# Patient Record
Sex: FEMALE | Race: WHITE | Employment: FULL TIME | URBAN - METROPOLITAN AREA
[De-identification: names, ages, dates, MRNs, and addresses within clinical notes are randomized per-mention and may not be internally consistent; named-entity substitution may affect disease eponyms.]

---

## 2017-01-06 ENCOUNTER — ROUTINE PRENATAL (OUTPATIENT)
Dept: OBGYN CLINIC | Age: 27
End: 2017-01-06

## 2017-01-06 VITALS — SYSTOLIC BLOOD PRESSURE: 110 MMHG | WEIGHT: 130.4 LBS | DIASTOLIC BLOOD PRESSURE: 60 MMHG | BODY MASS INDEX: 25.47 KG/M2

## 2017-01-06 DIAGNOSIS — Z34.02 ENCOUNTER FOR SUPERVISION OF NORMAL FIRST PREGNANCY IN SECOND TRIMESTER: Primary | ICD-10-CM

## 2017-01-06 LAB — AFP, MATERNAL, EXTERNAL: NORMAL

## 2017-01-06 NOTE — PATIENT INSTRUCTIONS

## 2017-01-06 NOTE — PROGRESS NOTES
Normal NT. AFP today. Doing well. Still taking Diclegis.  Will start PNV (taking gummies) and if can't will take slow Fe

## 2017-01-06 NOTE — PROGRESS NOTES
Problem List  Date Reviewed: 11/23/2016          Codes Class Noted    Supervision of normal first pregnancy ICD-10-CM: Z34.00  ICD-9-CM: V22.0  11/30/2016    Overview Addendum 12/14/2016  3:23 PM by Dee Winkler     Intrauterine pregnancy with the following problems identified:   [facility] 6/25/2017 by ovulation date and US  Hx of exercise induced asthma  Had flu vaccine at work  First trimester blood work WNL             H/O seasonal allergies ICD-10-CM: Z88.9  ICD-9-CM: V15.09  Unknown        Fibromyalgia ICD-10-CM: M79.7  ICD-9-CM: 729.1  Unknown        Celiac disease ICD-10-CM: K90.0  ICD-9-CM: 579.0  Unknown

## 2017-01-10 LAB
AFP ADJ MOM SERPL: 1.37
AFP INTERP SERPL-IMP: NORMAL
AFP INTERP SERPL-IMP: NORMAL
AFP SERPL-MCNC: 48.5 NG/ML
AGE AT DELIVERY: 29.4 YEARS
COMMENT, 018013: NORMAL
GA METHOD: NORMAL
GA: 16 WEEKS
IDDM PATIENT QL: NO
Lab: NORMAL
MULTIPLE PREGNANCY: NO
NEURAL TUBE DEFECT RISK FETUS: 3920 %
RESULTS, 017004: NORMAL

## 2017-02-03 ENCOUNTER — ROUTINE PRENATAL (OUTPATIENT)
Dept: OBGYN CLINIC | Age: 27
End: 2017-02-03

## 2017-02-03 VITALS — WEIGHT: 132 LBS | BODY MASS INDEX: 25.78 KG/M2 | DIASTOLIC BLOOD PRESSURE: 62 MMHG | SYSTOLIC BLOOD PRESSURE: 114 MMHG

## 2017-02-03 DIAGNOSIS — Z34.02 ENCOUNTER FOR SUPERVISION OF NORMAL FIRST PREGNANCY IN SECOND TRIMESTER: Primary | ICD-10-CM

## 2017-02-03 DIAGNOSIS — O28.3 ABNORMAL FETAL ULTRASOUND: ICD-10-CM

## 2017-02-03 NOTE — PATIENT INSTRUCTIONS

## 2017-02-03 NOTE — PROGRESS NOTES
US c/w prior dating. IVEF x 2. Disc small assn with Tri 21, patient's risk was <1/10K on NT. Doing well.  See MFM

## 2017-02-03 NOTE — PROGRESS NOTES
FETAL SURVEY  A SINGLE VIABLE IUP AT 20W1D GA BY LMP. FETAL CARDIAC MOTION OBSERVED. FETAL ANATOMY WELL VISUALIZED AND APPEARS WITHIN NORMAL LIMITS. TWO LEFT IVEF'S ARE SEEN TODAY. APPROPRIATE GROWTH MEASURED; SIZE = DATES. MARYCRUZ, CERVIX AND PLACENTA APPEAR WITHIN NORMAL LIMITS. GENDER: XY, PATIENT DOES NOT KNOW.     Problem List  Date Reviewed: 1/6/2017          Codes Class Noted    Supervision of normal first pregnancy ICD-10-CM: Z34.00  ICD-9-CM: V22.0  11/30/2016    Overview Addendum 1/10/2017  3:44 PM by Dee Winkler     Intrauterine pregnancy with the following problems identified:   [facility] 6/25/2017 by ovulation date and US  Hx of exercise induced asthma  Had flu vaccine at work  First trimester blood work WNL, MSAFP neg             H/O seasonal allergies ICD-10-CM: Z88.9  ICD-9-CM: V15.09  Unknown        Fibromyalgia ICD-10-CM: M79.7  ICD-9-CM: 729.1  Unknown        Celiac disease ICD-10-CM: K90.0  ICD-9-CM: 579.0  Unknown

## 2017-02-15 ENCOUNTER — HOSPITAL ENCOUNTER (OUTPATIENT)
Dept: PERINATAL CARE | Age: 27
Discharge: HOME | End: 2017-02-15
Attending: OBSTETRICS & GYNECOLOGY
Payer: COMMERCIAL

## 2017-02-15 PROCEDURE — 76811 OB US DETAILED SNGL FETUS: CPT | Performed by: OBSTETRICS & GYNECOLOGY

## 2017-03-03 ENCOUNTER — ROUTINE PRENATAL (OUTPATIENT)
Dept: OBGYN CLINIC | Age: 27
End: 2017-03-03

## 2017-03-03 VITALS — DIASTOLIC BLOOD PRESSURE: 58 MMHG | BODY MASS INDEX: 26.64 KG/M2 | SYSTOLIC BLOOD PRESSURE: 110 MMHG | WEIGHT: 136.4 LBS

## 2017-03-03 DIAGNOSIS — Z34.02 ENCOUNTER FOR SUPERVISION OF NORMAL FIRST PREGNANCY IN SECOND TRIMESTER: Primary | ICD-10-CM

## 2017-03-03 NOTE — PATIENT INSTRUCTIONS
Weeks 22 to 26 of Your Pregnancy: Care Instructions  Your Care Instructions    As you enter your 7th month of pregnancy at week 26, your baby's lungs are growing stronger and getting ready to breathe. You may notice that your baby responds to the sound of your or your partner's voice. You may also notice that your baby does less turning and twisting and more squirming or jerking. Jerking often means that your baby has the hiccups. Hiccups are perfectly normal and are only temporary. You may want to think about attending a childbirth preparation class. This is also a good time to start thinking about whether you want to have pain medicine during labor. Most pregnant women are tested for gestational diabetes between weeks 25 and 28. Gestational diabetes occurs when your blood sugar level gets too high when you're pregnant. The test is important, because you can have gestational diabetes and not know it. But the condition can cause problems for your baby. Follow-up care is a key part of your treatment and safety. Be sure to make and go to all appointments, and call your doctor if you are having problems. It's also a good idea to know your test results and keep a list of the medicines you take. How can you care for yourself at home? Ease discomfort from your baby's kicking  · Change your position. Sometimes this will cause your baby to change position too. · Take a deep breath while you raise your arm over your head. Then breathe out while you drop your arm. Do Kegel exercises to prevent urine from leaking  · You can do Kegel exercises while you stand or sit. ¨ Squeeze the same muscles you would use to stop your urine. Your belly and thighs should not move. ¨ Hold the squeeze for 3 seconds, and then relax for 3 seconds. ¨ Start with 3 seconds. Then add 1 second each week until you are able to squeeze for 10 seconds. ¨ Repeat the exercise 10 to 15 times for each session.  Do three or more sessions each day.  Ease or reduce swelling in your feet, ankles, hands, and fingers  · If your fingers are puffy, take off your rings. · Do not eat high-salt foods, such as potato chips. · Prop up your feet on a stool or couch as much as possible. Sleep with pillows under your feet. · Do not stand for long periods of time or wear tight shoes. · Wear support stockings. Where can you learn more? Go to http://karolyn-brandin.info/. Enter G264 in the search box to learn more about \"Weeks 22 to 26 of Your Pregnancy: Care Instructions. \"  Current as of: May 30, 2016  Content Version: 11.1  © 8301-5348 [employer]. Care instructions adapted under license by [employer] (which disclaims liability or warranty for this information). If you have questions about a medical condition or this instruction, always ask your healthcare professional. [place] [address] any warranty or liability for your use of this information.

## 2017-03-03 NOTE — PROGRESS NOTES
Problem List  Date Reviewed: 2/3/2017          Codes Class Noted    Supervision of normal first pregnancy ICD-10-CM: Z34.00  ICD-9-CM: V22.0  11/30/2016    Overview Addendum 2/16/2017  1:07 PM by Dee Winkler     Intrauterine pregnancy with the following problems identified:   [facility] 6/25/2017 by ovulation date and US  Hx of exercise induced asthma  Had flu vaccine at work  First trimester blood work WNL, MSAFP neg  MFM 34-36 to reeval myocardial EF             H/O seasonal allergies ICD-10-CM: Z88.9  ICD-9-CM: V15.09  Unknown        Fibromyalgia ICD-10-CM: M79.7  ICD-9-CM: 729.1  Unknown        Celiac disease ICD-10-CM: K90.0  ICD-9-CM: 579.0  Unknown

## 2017-03-28 LAB — GTT, 1 HR, GLUCOLA, EXTERNAL: NORMAL

## 2017-03-30 ENCOUNTER — ROUTINE PRENATAL (OUTPATIENT)
Dept: OBGYN CLINIC | Age: 27
End: 2017-03-30

## 2017-03-30 VITALS — WEIGHT: 138 LBS | SYSTOLIC BLOOD PRESSURE: 100 MMHG | BODY MASS INDEX: 26.95 KG/M2 | DIASTOLIC BLOOD PRESSURE: 58 MMHG

## 2017-03-30 DIAGNOSIS — Z23 ENCOUNTER FOR IMMUNIZATION: ICD-10-CM

## 2017-03-30 DIAGNOSIS — Z34.03 SUPERVISION OF NORMAL FIRST PREGNANCY IN THIRD TRIMESTER: Primary | ICD-10-CM

## 2017-03-30 LAB
HCT, EXTERNAL: 33.2
HGB, EXTERNAL: 11.2
PLATELET CNT,   EXTERNAL: 235

## 2017-03-30 NOTE — PROGRESS NOTES
Administered TDAP vaccine per MD order. Patient consent signed. Injection given IM in left deltoid, per patient request. Patient tolerated well, no complications.

## 2017-03-30 NOTE — PATIENT INSTRUCTIONS
Weeks 26 to 30 of Your Pregnancy: Care Instructions  Your Care Instructions    You are now in your last trimester of pregnancy. Your baby is growing rapidly. And you'll probably feel your baby moving around more often. Your doctor may ask you to count your baby's kicks. Your back may ache as your body gets used to your baby's size and length. If you haven't already had the Tdap shot during this pregnancy, talk to your doctor about getting it. It will help protect your  against pertussis infection. During this time, it's important to take care of yourself and pay attention to what your body needs. If you feel sexual, explore ways to be close with your partner that match your comfort and desire. Use the tips provided in this care sheet to find ways to be sexual in your own way. Follow-up care is a key part of your treatment and safety. Be sure to make and go to all appointments, and call your doctor if you are having problems. It's also a good idea to know your test results and keep a list of the medicines you take. How can you care for yourself at home? Take it easy at work  · Take frequent breaks. If possible, stop working when you are tired, and rest during your lunch hour. · Take bathroom breaks every 2 hours. · Change positions often. If you sit for long periods, stand up and walk around. · When you stand for a long time, keep one foot on a low stool with your knee bent. After standing a lot, sit with your feet up. · Avoid fumes, chemicals, and tobacco smoke. Be sexual in your own way  · Having sex during pregnancy is okay, unless your doctor tells you not to. · You may be very interested in sex, or you may have no interest at all. · Your growing belly can make it hard to find a good position during intercourse. [place] and explore. · You may get cramps in your uterus when your partner touches your breasts.   · A back rub may relieve the backache or cramps that sometimes follow orgasm. Learn about  labor  · Watch for signs of  labor. You may be going into labor if:  ¨ You have menstrual-like cramps, with or without nausea. ¨ You have about 4 or more contractions in 20 minutes, or about 8 or more within 1 hour, even after you have had a glass of water and are resting. ¨ You have a low, dull backache that does not go away when you change your position. ¨ You have pain or pressure in your pelvis that comes and goes in a pattern. ¨ You have intestinal cramping or flu-like symptoms, with or without diarrhea. ¨ You notice an increase or change in your vaginal discharge. Discharge may be heavy, mucus-like, watery, or streaked with blood. ¨ Your water breaks. · If you think you have  labor:  ¨ Drink 2 or 3 glasses of water or juice. Not drinking enough fluids can cause contractions. ¨ Stop what you are doing, and empty your bladder. Then lie down on your left side for at least 1 hour. ¨ While lying on your side, find your breast bone. Put your fingers in the soft spot just below it. Move your fingers down toward your belly button to find the top of your uterus. Check to see if it is tight. ¨ Contractions can be weak or strong. Record your contractions for an hour. Time a contraction from the start of one contraction to the start of the next one. ¨ Single or several strong contractions without a pattern are called Wilver-Ames contractions. They are practice contractions but not the start of labor. They often stop if you change what you are doing. ¨ Call your doctor if you have regular contractions. Where can you learn more? Go to http://karolyn-brandin.info/. Enter Z174 in the search box to learn more about \"Weeks 26 to 30 of Your Pregnancy: Care Instructions. \"  Current as of: May 30, 2016  Content Version: 11.2  © 5373-4549 [employer].  Care instructions adapted under license by [employer] (which disclaims liability or warranty for this information). If you have questions about a medical condition or this instruction, always ask your healthcare professional. [place] [address] any warranty or liability for your use of this information.

## 2017-03-30 NOTE — PROGRESS NOTES
Problem List  Date Reviewed: 3/3/2017          Codes Class Noted    Supervision of normal first pregnancy ICD-10-CM: Z34.00  ICD-9-CM: V22.0  11/30/2016    Overview Addendum 2/16/2017  1:07 PM by Dee Winkler     Intrauterine pregnancy with the following problems identified:   [facility] 6/25/2017 by ovulation date and US  Hx of exercise induced asthma  Had flu vaccine at work  First trimester blood work WNL, MSAFP neg  MFM 34-36 to reeval myocardial EF             H/O seasonal allergies ICD-10-CM: Z88.9  ICD-9-CM: V15.09  Unknown        Fibromyalgia ICD-10-CM: M79.7  ICD-9-CM: 729.1  Unknown        Celiac disease ICD-10-CM: K90.0  ICD-9-CM: 579.0  Unknown

## 2017-04-17 ENCOUNTER — ROUTINE PRENATAL (OUTPATIENT)
Dept: OBGYN CLINIC | Age: 27
End: 2017-04-17

## 2017-04-17 VITALS — SYSTOLIC BLOOD PRESSURE: 104 MMHG | WEIGHT: 142.6 LBS | BODY MASS INDEX: 27.85 KG/M2 | DIASTOLIC BLOOD PRESSURE: 60 MMHG

## 2017-04-17 DIAGNOSIS — Z34.03 SUPERVISION OF NORMAL FIRST PREGNANCY IN THIRD TRIMESTER: Primary | ICD-10-CM

## 2017-04-17 NOTE — PROGRESS NOTES
LIMITED OB SCAN  A SINGLE VERTEX 30W4D IUP IS SEEN. FETAL CARDIAC MOTION OBSERVED. LIMITED ANATOMY WAS VISUALIZED AND APPEARS WNL. APPROPRIATE FETAL GROWTH IS SEEN. SIZE = DATES. MARYCRUZ AND PLACENTA APPEAR WNL.

## 2017-04-17 NOTE — PATIENT INSTRUCTIONS
Weeks 30 to 32 of Your Pregnancy: Care Instructions  Your Care Instructions    You have made it to the final months of your pregnancy. By now, your baby is really starting to look like a baby, with hair and plump skin. As you enter the final weeks of pregnancy, the reality of having a baby may start to set in. This is the time to settle on a name, get your household in order, set up a safe nursery, and find quality  if needed. Doing these things in advance will allow you to focus on caring for and enjoying your new baby. You may also want to have a tour of your hospital's labor and delivery unit to get a better idea of what to expect while you are in the hospital.  During these last months, it is very important to take good care of yourself and pay attention to what your body needs. If your doctor says it is okay for you to work, don't push yourself too hard. Use the tips provided in this care sheet to ease heartburn and care for varicose veins. If you haven't already had the Tdap shot during this pregnancy, talk to your doctor about getting it. It will help protect your  against pertussis infection. Follow-up care is a key part of your treatment and safety. Be sure to make and go to all appointments, and call your doctor if you are having problems. It's also a good idea to know your test results and keep a list of the medicines you take. How can you care for yourself at home? Pay attention to your baby's movements  · You should feel your baby move several times every day. · Your baby now turns less, and kicks and jabs more. · Your baby sleeps 20 to 45 minutes at a time and is more active at certain times of day. · If your doctor wants you to count your baby's kicks:  ¨ Empty your bladder, and lie on your side or relax in a comfortable chair. ¨ Write down your start time. ¨ Pay attention only to your baby's movements. Count any movement except hiccups.   ¨ After you have counted 10 movements, write down your stop time. ¨ Write down how many minutes it took for your baby to move 10 times. ¨ If an hour goes by and you have not recorded 10 movements, have something to eat or drink and then count for another hour. If you do not record 10 movements in either hour, call your doctor. Ease heartburn  · Eat small, frequent meals. · Do not eat chocolate, peppermint, or very spicy foods. Avoid drinks with caffeine, such as coffee, tea, and sodas. · Avoid bending over or lying down after meals. · Talk a short walk after you eat. · If heartburn is a problem at night, do not eat for 2 hours before bedtime. · Take antacids like Mylanta, Maalox, Rolaids, or Tums. Do not take antacids that have sodium bicarbonate. Care for varicose veins  · Varicose veins are blood vessels that stretch out with the extra blood during pregnancy. Your legs may ache or throb. Most varicose veins will go away after the birth. · Avoid standing for long periods of time. Sit with your legs crossed at the ankles, not the knees. · Sit with your feet propped up. · Avoid tight clothing or stockings. Wear support hose. · Exercise regularly. Try walking for at least 30 minutes a day. Where can you learn more? Go to http://karolyn-brandin.info/. Enter D129 in the search box to learn more about \"Weeks 30 to 32 of Your Pregnancy: Care Instructions. \"  Current as of: May 30, 2016  Content Version: 11.2  © 3281-7828 [employer]. Care instructions adapted under license by [employer] (which disclaims liability or warranty for this information). If you have questions about a medical condition or this instruction, always ask your healthcare professional. [place] [address] any warranty or liability for your use of this information.

## 2017-04-25 ENCOUNTER — PATIENT MESSAGE (OUTPATIENT)
Dept: OBGYN CLINIC | Age: 27
End: 2017-04-25

## 2017-05-05 ENCOUNTER — ROUTINE PRENATAL (OUTPATIENT)
Dept: OBGYN CLINIC | Age: 27
End: 2017-05-05

## 2017-05-05 VITALS — WEIGHT: 143.8 LBS | SYSTOLIC BLOOD PRESSURE: 108 MMHG | BODY MASS INDEX: 28.08 KG/M2 | DIASTOLIC BLOOD PRESSURE: 58 MMHG

## 2017-05-05 DIAGNOSIS — Z34.03 SUPERVISION OF NORMAL FIRST PREGNANCY IN THIRD TRIMESTER: Primary | ICD-10-CM

## 2017-05-05 NOTE — PATIENT INSTRUCTIONS

## 2017-05-05 NOTE — PROGRESS NOTES
Problem List  Date Reviewed: 4/17/2017          Codes Class Noted    Supervision of normal first pregnancy ICD-10-CM: Z34.00  ICD-9-CM: V22.0  11/30/2016    Overview Addendum 4/3/2017 11:29 AM by Dee Winkler     Intrauterine pregnancy with the following problems identified:   [address] 6/25/2017 by ovulation date and US  Hx of exercise induced asthma  Had flu vaccine at work  First trimester blood work WNL, MSAFP neg  MFM 34-36 to reeval myocardial EF  Hgb 11.2 @ 28 wks             H/O seasonal allergies ICD-10-CM: Z88.9  ICD-9-CM: V15.09  Unknown        Fibromyalgia ICD-10-CM: M79.7  ICD-9-CM: 729.1  Unknown        Celiac disease ICD-10-CM: K90.0  ICD-9-CM: 579.0  Unknown

## 2017-05-19 ENCOUNTER — HOSPITAL ENCOUNTER (OUTPATIENT)
Dept: PERINATAL CARE | Age: 27
Discharge: HOME | End: 2017-05-19
Attending: OBSTETRICS & GYNECOLOGY
Payer: COMMERCIAL

## 2017-05-19 ENCOUNTER — ROUTINE PRENATAL (OUTPATIENT)
Dept: OBGYN CLINIC | Age: 27
End: 2017-05-19

## 2017-05-19 VITALS — SYSTOLIC BLOOD PRESSURE: 128 MMHG | DIASTOLIC BLOOD PRESSURE: 68 MMHG | WEIGHT: 146 LBS | BODY MASS INDEX: 28.51 KG/M2

## 2017-05-19 DIAGNOSIS — Z34.03 SUPERVISION OF NORMAL FIRST PREGNANCY IN THIRD TRIMESTER: Primary | ICD-10-CM

## 2017-05-19 LAB — GRBS, EXTERNAL: POSITIVE

## 2017-05-19 PROCEDURE — 76816 OB US FOLLOW-UP PER FETUS: CPT | Performed by: OBSTETRICS & GYNECOLOGY

## 2017-05-19 NOTE — PATIENT INSTRUCTIONS

## 2017-05-19 NOTE — PROGRESS NOTES
Problem List  Date Reviewed: 5/5/2017          Codes Class Noted    Supervision of normal first pregnancy ICD-10-CM: Z34.00  ICD-9-CM: V22.0  11/30/2016    Overview Addendum 4/3/2017 11:29 AM by Dee Winkler     Intrauterine pregnancy with the following problems identified:   [facility] 6/25/2017 by ovulation date and US  Hx of exercise induced asthma  Had flu vaccine at work  First trimester blood work WNL, MSAFP neg  MFM 34-36 to reeval myocardial EF  Hgb 11.2 @ 28 wks             H/O seasonal allergies ICD-10-CM: Z88.9  ICD-9-CM: V15.09  Unknown        Fibromyalgia ICD-10-CM: M79.7  ICD-9-CM: 729.1  Unknown        Celiac disease ICD-10-CM: K90.0  ICD-9-CM: 579.0  Unknown

## 2017-05-26 ENCOUNTER — ROUTINE PRENATAL (OUTPATIENT)
Dept: OBGYN CLINIC | Age: 27
End: 2017-05-26

## 2017-05-26 VITALS — DIASTOLIC BLOOD PRESSURE: 58 MMHG | WEIGHT: 147 LBS | BODY MASS INDEX: 28.71 KG/M2 | SYSTOLIC BLOOD PRESSURE: 108 MMHG

## 2017-05-26 DIAGNOSIS — Z34.03 SUPERVISION OF NORMAL FIRST PREGNANCY IN THIRD TRIMESTER: Primary | ICD-10-CM

## 2017-05-26 NOTE — PROGRESS NOTES
Problem List  Date Reviewed: 5/19/2017          Codes Class Noted    Supervision of normal first pregnancy ICD-10-CM: Z34.00  ICD-9-CM: V22.0  11/30/2016    Overview Addendum 4/3/2017 11:29 AM by Dee Winkler     Intrauterine pregnancy with the following problems identified:   [facility] 6/25/2017 by ovulation date and US  Hx of exercise induced asthma  Had flu vaccine at work  First trimester blood work WNL, MSAFP neg  MFM 34-36 to reeval myocardial EF  Hgb 11.2 @ 28 wks             H/O seasonal allergies ICD-10-CM: Z88.9  ICD-9-CM: V15.09  Unknown        Fibromyalgia ICD-10-CM: M79.7  ICD-9-CM: 729.1  Unknown        Celiac disease ICD-10-CM: K90.0  ICD-9-CM: 579.0  Unknown

## 2017-05-26 NOTE — PROGRESS NOTES
Doing well. Baby moving. GBS pos - PCN allergy = rash.  Has taken Keflex in past no problem - plan use Ancef

## 2017-06-02 ENCOUNTER — ROUTINE PRENATAL (OUTPATIENT)
Dept: OBGYN CLINIC | Age: 27
End: 2017-06-02

## 2017-06-02 VITALS — BODY MASS INDEX: 29.18 KG/M2 | DIASTOLIC BLOOD PRESSURE: 60 MMHG | SYSTOLIC BLOOD PRESSURE: 110 MMHG | WEIGHT: 149.4 LBS

## 2017-06-02 DIAGNOSIS — Z34.03 SUPERVISION OF NORMAL FIRST PREGNANCY IN THIRD TRIMESTER: Primary | ICD-10-CM

## 2017-06-02 NOTE — PATIENT INSTRUCTIONS
Week 37 of Your Pregnancy: Care Instructions  Your Care Instructions    You are near the end of your pregnancy--and you're probably pretty uncomfortable. It may be harder to walk around. Lying down probably isn't comfortable either. You may have trouble getting to sleep or staying asleep. Most women deliver their babies between 40 and 41 weeks. This is a good time to think about packing a bag for the hospital with items you'll need. Then you'll be ready when labor starts. Follow-up care is a key part of your treatment and safety. Be sure to make and go to all appointments, and call your doctor if you are having problems. It's also a good idea to know your test results and keep a list of the medicines you take. How can you care for yourself at home? Learn about breastfeeding  · Breastfeeding is best for your baby and good for you. · Breast milk has antibodies to help your baby fight infections. · Mothers who breastfeed often lose weight faster, because making milk burns calories. · Learning the best ways to hold your baby will make breastfeeding easier. · Let your partner bathe and diaper the baby to keep your partner from feeling left out. Snuggle together when you breastfeed. · You may want to learn how to use a breast pump and store your milk. · If you choose to bottle feed, make the feeding feel like breastfeeding so you can bond with your baby. Always hold your baby and the bottle. Do not prop bottles or let your baby fall asleep with a bottle. Learn about crying  · It is common for babies to cry for 1 to 3 hours a day. Some cry more, some cry less. · Babies don't cry to make you upset or because you are a bad parent. · Crying is how your baby communicates. Your baby may be hungry; have gas; need a diaper change; or feel cold, warm, tired, lonely, or tense. Sometimes babies cry for unknown reasons. · If you respond to your baby's needs, he or she will learn to trust you.   · Try to stay calm when your baby cries. Your baby may get more upset if he or she senses that you are upset. Know how to care for your   · Your baby's umbilical cord stump will drop off on its own, usually between 1 and 2 weeks. To care for your baby's umbilical cord area:  ¨ Clean the area at the bottom of the cord 2 or 3 times a day. ¨ Pay special attention to the area where the cord attaches to the skin. ¨ Keep the diaper folded below the cord. ¨ Use a damp washcloth or cotton ball to sponge bathe your baby until the stump has come off. · Your baby's first dark stool is called meconium. After the meconium is passed, your baby will develop his or her own bowel pattern. ¨ Some babies, especially  babies, have several bowel movements a day. Others have one or two a day, or one every 2 to 3 days. ¨  babies often have loose, yellow stools. Formula-fed babies have more formed stools. ¨ If your baby's stools look like little pellets, he or she is constipated. After 2 days of constipation, call your baby's doctor. · If your baby will be circumcised, you can care for him at home. ¨ Gently rinse his penis with warm water after every diaper change. Do not try to remove the film that forms on the penis. This film will go away on its own. Pat dry. ¨ Put petroleum ointment, such as Vaseline, on the area of the diaper that will touch your baby's penis. This will keep the diaper from sticking to your baby. ¨ Ask the doctor about giving your baby acetaminophen (Tylenol) for pain. Where can you learn more? Go to http://karolyn-brandin.info/. Enter 68 21 [id number] in the search box to learn more about \"Week 37 of Your Pregnancy: Care Instructions. \"  Current as of: May 30, 2016  Content Version: 11.2  © 3425-9369 [employer]. Care instructions adapted under license by [employer] (which disclaims liability or warranty for this information).  If you have questions about a medical condition or this instruction, always ask your healthcare professional. [place] [address] any warranty or liability for your use of this information.

## 2017-06-02 NOTE — PROGRESS NOTES
Problem List  Date Reviewed: 5/26/2017          Codes Class Noted    Supervision of normal first pregnancy ICD-10-CM: Z34.00  ICD-9-CM: V22.0  11/30/2016    Overview Addendum 5/26/2017  9:11 AM by Amita Wu MD     Intrauterine pregnancy with the following problems identified:   [facility] 6/25/2017 by ovulation date and US  Hx of exercise induced asthma  Had flu vaccine at work  First trimester blood work WNL, MSAFP neg  MFM 34-36 to reeval myocardial EF - fu with MFM normal  Hgb 11.2 @ 28 wks  GBS pos - use Ancef             H/O seasonal allergies ICD-10-CM: Z88.9  ICD-9-CM: V15.09  Unknown        Fibromyalgia ICD-10-CM: M79.7  ICD-9-CM: 729.1  Unknown        Celiac disease ICD-10-CM: K90.0  ICD-9-CM: 579.0  Unknown

## 2017-06-09 ENCOUNTER — ROUTINE PRENATAL (OUTPATIENT)
Dept: OBGYN CLINIC | Age: 27
End: 2017-06-09

## 2017-06-09 VITALS — DIASTOLIC BLOOD PRESSURE: 58 MMHG | WEIGHT: 149.2 LBS | BODY MASS INDEX: 29.14 KG/M2 | SYSTOLIC BLOOD PRESSURE: 102 MMHG

## 2017-06-09 DIAGNOSIS — Z34.03 SUPERVISION OF NORMAL FIRST PREGNANCY IN THIRD TRIMESTER: Primary | ICD-10-CM

## 2017-06-09 NOTE — PROGRESS NOTES
Problem List  Date Reviewed: 6/2/2017          Codes Class Noted    Supervision of normal first pregnancy ICD-10-CM: Z34.00  ICD-9-CM: V22.0  11/30/2016    Overview Addendum 5/26/2017  9:11 AM by Dex Bang MD     Intrauterine pregnancy with the following problems identified:   [facility] 6/25/2017 by ovulation date and US  Hx of exercise induced asthma  Had flu vaccine at work  First trimester blood work WNL, MSAFP neg  MFM 34-36 to reeval myocardial EF - fu with MFM normal  Hgb 11.2 @ 28 wks  GBS pos - use Ancef             H/O seasonal allergies ICD-10-CM: Z88.9  ICD-9-CM: V15.09  Unknown        Fibromyalgia ICD-10-CM: M79.7  ICD-9-CM: 729.1  Unknown        Celiac disease ICD-10-CM: K90.0  ICD-9-CM: 579.0  Unknown

## 2017-06-16 ENCOUNTER — ROUTINE PRENATAL (OUTPATIENT)
Dept: OBGYN CLINIC | Age: 27
End: 2017-06-16

## 2017-06-16 VITALS — WEIGHT: 149 LBS | SYSTOLIC BLOOD PRESSURE: 112 MMHG | BODY MASS INDEX: 29.1 KG/M2 | DIASTOLIC BLOOD PRESSURE: 58 MMHG

## 2017-06-16 DIAGNOSIS — Z34.03 SUPERVISION OF NORMAL FIRST PREGNANCY IN THIRD TRIMESTER: Primary | ICD-10-CM

## 2017-06-16 NOTE — PATIENT INSTRUCTIONS
Week 39 of Your Pregnancy: Care Instructions  Your Care Instructions    During these final weeks, you may feel anxious to see your new baby. [place] babies often look different from what you see in pictures or movies. Right after birth, their heads may have a strange shape. Their eyes may be puffy. And their genitals may be swollen. They may also have very dry skin, or red marks on the eyelids, nose, or neck. Still, most parents think their babies are beautiful. Follow-up care is a key part of your treatment and safety. Be sure to make and go to all appointments, and call your doctor if you are having problems. It's also a good idea to know your test results and keep a list of the medicines you take. How can you care for yourself at home? Prepare to breastfeed  · If you are breastfeeding, continue to eat healthy foods. · Avoid alcohol, cigarettes, and drugs. This includes prescription and over-the-counter medicines. · You can help prevent sore nipples if you feed your baby in the correct position. Nurses will help you learn to do this. · Your  will need to be fed about every 1½ to 3 hours. Choose the right birth control after your baby is born  · Women who are breastfeeding can still get pregnant. Use birth control if you don't want to get pregnant. · Intrauterine devices (IUDs) work for women who want to wait at least 2 years before getting pregnant again. They are safe to use while you are breastfeeding. · Depo-Provera can be used while you are breastfeeding. It is a shot you get every 3 months. · Birth control pills work well. But you need a different kind of pill while you are breastfeeding. And when you start taking these pills, you need to make sure to use another type of birth control until you start your second pack. · Diaphragms, cervical caps, tubal implants, and condoms with spermicide work less well after birth.  If you have a diaphragm or cervical cap, you will need to have it refitted. · Tubal ligation (tying your tubes) and vasectomy are both permanent. These are good options if you are sure you are done having children. Where can you learn more? Go to http://karolyn-brandin.info/. Enter A499 in the search box to learn more about \"Week 39 of Your Pregnancy: Care Instructions. \"  Current as of: May 30, 2016  Content Version: 11.2  © 3490-5143 [employer]. Care instructions adapted under license by [employer] (which disclaims liability or warranty for this information). If you have questions about a medical condition or this instruction, always ask your healthcare professional. [address] any warranty or liability for your use of this information.

## 2017-06-16 NOTE — PROGRESS NOTES
Problem List  Date Reviewed: 6/9/2017          Codes Class Noted    Supervision of normal first pregnancy ICD-10-CM: Z34.00  ICD-9-CM: V22.0  11/30/2016    Overview Addendum 5/26/2017  9:11 AM by Regis Nicolas MD     Intrauterine pregnancy with the following problems identified:   [facility] 6/25/2017 by ovulation date and US  Hx of exercise induced asthma  Had flu vaccine at work  First trimester blood work WNL, MSAFP neg  MFM 34-36 to reeval myocardial EF - fu with MFM normal  Hgb 11.2 @ 28 wks  GBS pos - use Ancef             H/O seasonal allergies ICD-10-CM: Z88.9  ICD-9-CM: V15.09  Unknown        Fibromyalgia ICD-10-CM: M79.7  ICD-9-CM: 729.1  Unknown        Celiac disease ICD-10-CM: K90.0  ICD-9-CM: 579.0  Unknown

## 2017-06-16 NOTE — PROGRESS NOTES
Baby moving. Doing well. Having [address] Post induction next visit - wants to wait until 41+.  Ultrasound next visit

## 2017-06-21 ENCOUNTER — ROUTINE PRENATAL (OUTPATIENT)
Dept: OBGYN CLINIC | Age: 27
End: 2017-06-21

## 2017-06-21 VITALS — WEIGHT: 150 LBS | SYSTOLIC BLOOD PRESSURE: 112 MMHG | DIASTOLIC BLOOD PRESSURE: 64 MMHG | BODY MASS INDEX: 29.29 KG/M2

## 2017-06-21 DIAGNOSIS — Z34.03 SUPERVISION OF NORMAL FIRST PREGNANCY IN THIRD TRIMESTER: Primary | ICD-10-CM

## 2017-06-21 NOTE — PROGRESS NOTES
Baby moving. ?LOF this am. Pool/nit both negative. US today MARYCRUZ 19cm, EFW 32%tile 7-4. Desires to spontaneously labor. Can go to 42 weeks with proper monitoring.  MARYCRUZ/NST at 41 weeks and once again prior to luis on 7/5

## 2017-06-21 NOTE — PROGRESS NOTES
Problem List  Date Reviewed: 6/16/2017          Codes Class Noted    Supervision of normal first pregnancy ICD-10-CM: Z34.00  ICD-9-CM: V22.0  11/30/2016    Overview Addendum 5/26/2017  9:11 AM by Antionette Lugo MD     Intrauterine pregnancy with the following problems identified:   [facility] 6/25/2017 by ovulation date and US  Hx of exercise induced asthma  Had flu vaccine at work  First trimester blood work WNL, MSAFP neg  MFM 34-36 to reeval myocardial EF - fu with MFM normal  Hgb 11.2 @ 28 wks  GBS pos - use Ancef             H/O seasonal allergies ICD-10-CM: Z88.9  ICD-9-CM: V15.09  Unknown        Fibromyalgia ICD-10-CM: M79.7  ICD-9-CM: 729.1  Unknown        Celiac disease ICD-10-CM: K90.0  ICD-9-CM: 579.0  Unknown

## 2017-06-21 NOTE — PATIENT INSTRUCTIONS
Week 40 of Your Pregnancy: Care Instructions  Your Care Instructions    By week 40, you have reached your due date. Your baby could be coming any day. But it's a good idea to think ahead to the next few weeks and what might happen. If this is your first time having a baby, try not to worry. If you don't start labor on your own by 41 or 42 weeks, your doctor may recommend giving you medicines to start labor. This care sheet gives you information about how labor can be started. It also gives you some ideas about breathing exercises you can do if you start to feel anxious or if you are trying to relax. Follow-up care is a key part of your treatment and safety. Be sure to make and go to all appointments, and call your doctor if you are having problems. It's also a good idea to know your test results and keep a list of the medicines you take. How can you care for yourself at home? Learn how labor can be started  · If you and your baby are both healthy and ready, and if your cervix has started to open, your doctor may \"break your water\" (rupture the amniotic sac). This often starts labor. · If your cervix is not quite ready, you may get a medicine called Pitocin through an IV to start contractions. · If your cervix is still very firm, you may have prostaglandin tablets (misoprostol) placed in your vagina to soften the cervix. Try guided imagery to help you relax  · Find a comfortable place to sit or lie down. Close your eyes. · Start by just taking a few deep breaths to help you relax. · Picture a setting that is calm and peaceful. This could be a beach, a mountain setting, a meadow, or a scene that you choose. · Imagine your scene, and try to add some detail. For example, is there a breeze? What does the yocasta look like? Is it clear, or are there clouds? · It often helps to add a path to your scene.  For example, as you enter the meadow, imagine a path leading you through the meadow to the trees on the other side. As you follow the path farther into the [facility] you feel more and more relaxed. · When you are deep into your scene and are feeling relaxed, take a few minutes to breathe slowly and feel the calm. · When you are ready, slowly take yourself out of the scene back to the present. Tell yourself that you will feel relaxed and refreshed and will bring that sense of calm with you. · Count to 3, and open your eyes. Where can you learn more? Go to http://karolyn-brandin.info/. Enter Y882 in the search box to learn more about \"Week 40 of Your Pregnancy: Care Instructions. \"  Current as of: March 16, 2017  Content Version: 11.3  © 4342-9087 [employer]. Care instructions adapted under license by [employer] (which disclaims liability or warranty for this information). If you have questions about a medical condition or this instruction, always ask your healthcare professional. [address] any warranty or liability for your use of this information.

## 2017-06-23 ENCOUNTER — HOSPITAL ENCOUNTER (INPATIENT)
Age: 27
LOS: 2 days | Discharge: HOME | End: 2017-06-26
Attending: OBSTETRICS & GYNECOLOGY | Admitting: OBSTETRICS & GYNECOLOGY
Payer: COMMERCIAL

## 2017-06-23 PROBLEM — Z34.90 PREGNANCY: Status: ACTIVE | Noted: 2017-06-23

## 2017-06-23 PROCEDURE — 99283 EMERGENCY DEPT VISIT LOW MDM: CPT | Performed by: OBSTETRICS & GYNECOLOGY

## 2017-06-23 PROCEDURE — 75410000003 HC RECOV DEL/VAG/CSECN EA 0.5 HR: Performed by: OBSTETRICS & GYNECOLOGY

## 2017-06-23 PROCEDURE — 75410000002 HC LABOR FEE PER 1 HR: Performed by: OBSTETRICS & GYNECOLOGY

## 2017-06-23 PROCEDURE — 36415 COLL VENOUS BLD VENIPUNCTURE: CPT | Performed by: OBSTETRICS & GYNECOLOGY

## 2017-06-23 PROCEDURE — 85025 COMPLETE CBC W/AUTO DIFF WBC: CPT | Performed by: OBSTETRICS & GYNECOLOGY

## 2017-06-23 PROCEDURE — 75410000000 HC DELIVERY VAGINAL/SINGLE: Performed by: OBSTETRICS & GYNECOLOGY

## 2017-06-23 PROCEDURE — 76060000078 HC EPIDURAL ANESTHESIA: Performed by: ANESTHESIOLOGY

## 2017-06-23 PROCEDURE — 77010026065 HC OXYGEN MINIMUM MEDICAL AIR: Performed by: OBSTETRICS & GYNECOLOGY

## 2017-06-23 PROCEDURE — 59025 FETAL NON-STRESS TEST: CPT | Performed by: OBSTETRICS & GYNECOLOGY

## 2017-06-23 RX ORDER — NALOXONE HYDROCHLORIDE 0.4 MG/ML
0.4 INJECTION, SOLUTION INTRAMUSCULAR; INTRAVENOUS; SUBCUTANEOUS AS NEEDED
Status: DISCONTINUED | OUTPATIENT
Start: 2017-06-23 | End: 2017-06-24 | Stop reason: SDUPTHER

## 2017-06-23 RX ORDER — CEFAZOLIN SODIUM IN 0.9 % NACL 2 G/50 ML
2 INTRAVENOUS SOLUTION, PIGGYBACK (ML) INTRAVENOUS ONCE
Status: COMPLETED | OUTPATIENT
Start: 2017-06-24 | End: 2017-06-24

## 2017-06-23 RX ORDER — SODIUM CHLORIDE, SODIUM LACTATE, POTASSIUM CHLORIDE, CALCIUM CHLORIDE 600; 310; 30; 20 MG/100ML; MG/100ML; MG/100ML; MG/100ML
125 INJECTION, SOLUTION INTRAVENOUS CONTINUOUS
Status: DISCONTINUED | OUTPATIENT
Start: 2017-06-24 | End: 2017-06-26

## 2017-06-23 RX ORDER — SODIUM CHLORIDE 0.9 % (FLUSH) 0.9 %
5-10 SYRINGE (ML) INJECTION AS NEEDED
Status: DISCONTINUED | OUTPATIENT
Start: 2017-06-23 | End: 2017-06-26 | Stop reason: HOSPADM

## 2017-06-23 RX ORDER — SODIUM CHLORIDE 0.9 % (FLUSH) 0.9 %
5-10 SYRINGE (ML) INJECTION EVERY 8 HOURS
Status: DISCONTINUED | OUTPATIENT
Start: 2017-06-24 | End: 2017-06-26

## 2017-06-23 NOTE — IP AVS SNAPSHOT
[address] [address] [facility] [address] [phone] Patient: Marilyn Vasquez MRN: [id number] DNA:[birth year]43 You are allergic to the following Allergen Reactions Amoxicillin Hives Gluten Unable to Obtain Recent Documentation Height Weight Breastfeeding? BMI OB Status Smoking Status 1.524 m 68 kg Unknown 29.29 kg/m2 Recent pregnancy Never Smoker Unresulted Labs Order Current Status SAMPLE TO BLOOD BANK In process Emergency Contacts Name Discharge Info Relation Home Work Mobile Elier Novoa  Spouse [3] [phone] About your hospitalization You were admitted on:  June 24, 2017 You last received care in the:  [facility] You were discharged on:  June 26, 2017 Unit phone number:  [phone] Why you were hospitalized Your primary diagnosis was:  Not on File Your diagnoses also included:  Pregnancy Providers Seen During Your Hospitalizations Provider Role Specialty Primary office phone Robbin Melendez MD Attending Provider Obstetrics & Gynecology [phone] Your Primary Care Physician (PCP) Primary Care Physician Office Phone Office Fax Shilpi Lara [phone] Follow-up Information Follow up With Details Comments Contact Info Stephenie Brunson MD   [address] [facility] [address] [place] [address] 
[phone] Robbin Melendez MD In 6 weeks  [address] [facility] [facility] [address] [phone] Your Appointments Thursday June 29, 2017  9:00 AM EDT ULTRASOUND with Nile Gallegos ([facility] [address] [facility] [address] [phone] Thursday June 29, 2017  9:30 AM EDT  
OB VISIT with MD Branden Siegel Asp ([facility]) [address] [place] [address] [address] [place] [address]  
[phone] Monday July 03, 2017  8:30 AM EDT ULTRASOUND with Jannettesydneymatthew Dover Branden Gallegos ([facility]) [address] [place] [address] [address] [place] [address]  
[phone] Monday July 03, 2017 10:30 AM EDT  
OB VISIT with MD Branden Appiah ([facility]) [address] [place] [address] [address] [place] [address]  
[phone] Wednesday July 05, 2017  3:00 PM EDT  
OB VISIT with MD Branden Appiah ([facility]) [address] [place] [address] [address] [place] [address]  
[phone] Thursday July 06, 2017  7:00 AM EDT PROCEDURE with MD Branden Appiah ([facility]) [address] [place] [address] [address] [place] [address]  
[phone] Current Discharge Medication List  
  
START taking these medications Dose & Instructions Dispensing Information Comments Morning Noon Evening Bedtime  
 ibuprofen 800 mg tablet Commonly known as:  MOTRIN Your last dose was: Your next dose is:    
   
   
 Dose:  800 mg Take 1 Tab by mouth every eight (8) hours. Quantity:  60 Tab Refills:  0  
     
   
   
   
  
 oxyCODONE-acetaminophen 5-325 mg per tablet Commonly known as:  PERCOCET Your last dose was: Your next dose is:    
   
   
 Dose:  1-2 Tab Take 1-2 Tabs by mouth every four (4) hours as needed for Pain. Max Daily Amount: 12 Tabs. Quantity:  28 Tab Refills:  0 ASK your doctor about these medications Dose & Instructions Dispensing Information Comments Morning Noon Evening Bedtime  
 prenatal multivit-ca-min-fe-fa Tab Your last dose was: Your next dose is: Take  by mouth. Refills:  0 ZyrTEC 10 mg tablet Generic drug:  cetirizine Your last dose was: Your next dose is: Take  by mouth. Refills:  0 Where to Get Your Medications Information on where to get these meds will be given to you by the nurse or doctor. ! Ask your nurse or doctor about these medications  
  ibuprofen 800 mg tablet  
 oxyCODONE-acetaminophen 5-325 mg per tablet Discharge Instructions POST DELIVERY DISCHARGE INSTRUCTIONS Name: Pia Zuniga YOB: 1990 Primary Diagnosis: Active Problems: 
  Pregnancy (2017) General:  
 
Diet/Diet Restrictions: 
Eight 8-ounce glasses of fluid daily (water, juices); avoid excessive caffeine intake. Meals/snacks as desired which are high in fiber and carbohydrates and low in fat and cholesterol. Physical Activity / Restrictions / Safety:  
 
Avoid heavy lifting, no more that 8 lbs. For 2-3 weeks; No driving while taking narcotic pain medication. Post  patients should not drive until pain free. No intercourse 4-6 weeks, no douching or tampon use. May resume exercise in 6 weeks. Discharge Instructions/Special Treatment/Home Care Needs:  
 
Continue prenatal vitamins. Continue to use squirt bottle with warm water on your episiotomy after each bathroom use until bleeding stops. If steri-strips applied to your incision, remove in 7 days. Take stool softeners daily. Call your doctor for the following:  
 
Fever over 101 degrees by mouth. Vaginal bleeding heavier than a normal menstrual period or lost larger than a golf ball. Red streaks or increased swelling of legs, painful red streaks on your breast. 
Painful urination, or increased pain, redness or discharge with your incision. Pain Management:  
 
Pain Management:  
Take Acetaminophen (Tylenol) or Ibuprofen (Advil, Motrin), as directed for pain.  Use a warm Sitz bath 3 times daily to relieve episiotomy or hemorrhoidal discomfort. Heating pad to  incision as needed. For hemorrhoidal discomfort, use Tucks and Anusol cream as needed and directed. Follow-Up Care:  
 
Appointment with MD: Follow-up Appointments Procedures  FOLLOW UP VISIT Appointment in: 6 Weeks Standing Status:   Standing Number of Occurrences:   1 Order Specific Question:   Appointment in Answer:   6 Weeks Telephone number: [phone] Signed By: Katia Pham MD                                                                                                   Date: 2017 Time: 10:30 AM 
 
 
Discharge Orders None [employer] Announcement We are excited to announce that we are making your provider's discharge notes available to you in Valmarc. You will see these notes when they are completed and signed by the physician that discharged you from your recent hospital stay. If you have any questions or concerns about any information you see in Valmarc, please call the Health Information Department where you were seen or reach out to your Primary Care Provider for more information about your plan of care. Introducing [facility]! Dear Milagros Bee: Thank you for requesting a Valmarc account. Our records indicate that you already have an active Valmarc account. You can access your account anytime at [email or web address] Did you know that you can access your hospital and ER discharge instructions at any time in Valmarc? You can also review all of your test results from your hospital stay or ER visit. Additional Information If you have questions, please visit the Frequently Asked Questions section of the Valmarc website at [email or web address] Remember, Valmarc is NOT to be used for urgent needs. For medical emergencies, dial 911. Now available from your iPhone and Android! General Information Please provide this summary of care documentation to your next provider. Patient Signature:  ____________________________________________________________ Date:  ____________________________________________________________  
  
Shelley Trenton Provider Signature:  ____________________________________________________________ Date:  ____________________________________________________________

## 2017-06-23 NOTE — IP AVS SNAPSHOT
Current Discharge Medication List  
  
START taking these medications Dose & Instructions Dispensing Information Comments Morning Noon Evening Bedtime  
 ibuprofen 800 mg tablet Commonly known as:  MOTRIN Your last dose was: Your next dose is:    
   
   
 Dose:  800 mg Take 1 Tab by mouth every eight (8) hours. Quantity:  60 Tab Refills:  0  
     
   
   
   
  
 oxyCODONE-acetaminophen 5-325 mg per tablet Commonly known as:  PERCOCET Your last dose was: Your next dose is:    
   
   
 Dose:  1-2 Tab Take 1-2 Tabs by mouth every four (4) hours as needed for Pain. Max Daily Amount: 12 Tabs. Quantity:  28 Tab Refills:  0 ASK your doctor about these medications Dose & Instructions Dispensing Information Comments Morning Noon Evening Bedtime  
 prenatal multivit-ca-min-fe-fa Tab Your last dose was: Your next dose is: Take  by mouth. Refills:  0 ZyrTEC 10 mg tablet Generic drug:  cetirizine Your last dose was: Your next dose is: Take  by mouth. Refills:  0 Where to Get Your Medications Information on where to get these meds will be given to you by the nurse or doctor. ! Ask your nurse or doctor about these medications  
  ibuprofen 800 mg tablet  
 oxyCODONE-acetaminophen 5-325 mg per tablet

## 2017-06-24 ENCOUNTER — ANESTHESIA EVENT (OUTPATIENT)
Dept: LABOR AND DELIVERY | Age: 27
End: 2017-06-24
Payer: COMMERCIAL

## 2017-06-24 ENCOUNTER — ANESTHESIA (OUTPATIENT)
Dept: LABOR AND DELIVERY | Age: 27
End: 2017-06-24
Payer: COMMERCIAL

## 2017-06-24 LAB
BASOPHILS # BLD AUTO: 0 K/UL (ref 0–0.1)
BASOPHILS # BLD: 0 % (ref 0–1)
EOSINOPHIL # BLD: 0.1 K/UL (ref 0–0.4)
EOSINOPHIL NFR BLD: 1 % (ref 0–7)
ERYTHROCYTE [DISTWIDTH] IN BLOOD BY AUTOMATED COUNT: 12 % (ref 11.5–14.5)
HCT VFR BLD AUTO: 32 % (ref 35–47)
HGB BLD-MCNC: 11.1 G/DL (ref 11.5–16)
LYMPHOCYTES # BLD AUTO: 19 % (ref 12–49)
LYMPHOCYTES # BLD: 1.7 K/UL (ref 0.8–3.5)
MCH RBC QN AUTO: 31.8 PG (ref 26–34)
MCHC RBC AUTO-ENTMCNC: 34.7 G/DL (ref 30–36.5)
MCV RBC AUTO: 91.7 FL (ref 80–99)
MONOCYTES # BLD: 0.8 K/UL (ref 0–1)
MONOCYTES NFR BLD AUTO: 8 % (ref 5–13)
NEUTS SEG # BLD: 6.4 K/UL (ref 1.8–8)
NEUTS SEG NFR BLD AUTO: 72 % (ref 32–75)
PLATELET # BLD AUTO: 220 K/UL (ref 150–400)
RBC # BLD AUTO: 3.49 M/UL (ref 3.8–5.2)
WBC # BLD AUTO: 8.9 K/UL (ref 3.6–11)

## 2017-06-24 PROCEDURE — 75410000002 HC LABOR FEE PER 1 HR: Performed by: OBSTETRICS & GYNECOLOGY

## 2017-06-24 PROCEDURE — 74011000258 HC RX REV CODE- 258: Performed by: OBSTETRICS & GYNECOLOGY

## 2017-06-24 PROCEDURE — 4A1H7CZ MONITORING OF PRODUCTS OF CONCEPTION, CARDIAC RATE, VIA NATURAL OR ARTIFICIAL OPENING: ICD-10-PCS | Performed by: OBSTETRICS & GYNECOLOGY

## 2017-06-24 PROCEDURE — 74011250636 HC RX REV CODE- 250/636: Performed by: ANESTHESIOLOGY

## 2017-06-24 PROCEDURE — 77030010848 HC CATH INTUTR PRSS KOLB -B

## 2017-06-24 PROCEDURE — 74011250636 HC RX REV CODE- 250/636: Performed by: OBSTETRICS & GYNECOLOGY

## 2017-06-24 PROCEDURE — 10H07YZ INSERTION OF OTHER DEVICE INTO PRODUCTS OF CONCEPTION, VIA NATURAL OR ARTIFICIAL OPENING: ICD-10-PCS | Performed by: OBSTETRICS & GYNECOLOGY

## 2017-06-24 PROCEDURE — 77030034850

## 2017-06-24 PROCEDURE — 3E0R3CZ INTRODUCE REGIONAL ANESTH IN SPINAL CANAL, PERC: ICD-10-PCS | Performed by: ANESTHESIOLOGY

## 2017-06-24 PROCEDURE — 74011250637 HC RX REV CODE- 250/637: Performed by: OBSTETRICS & GYNECOLOGY

## 2017-06-24 PROCEDURE — 77030014125 HC TY EPDRL BBMI -B: Performed by: ANESTHESIOLOGY

## 2017-06-24 PROCEDURE — 65270000029 HC RM PRIVATE

## 2017-06-24 PROCEDURE — 74011250636 HC RX REV CODE- 250/636

## 2017-06-24 PROCEDURE — 0KQM0ZZ REPAIR PERINEUM MUSCLE, OPEN APPROACH: ICD-10-PCS | Performed by: OBSTETRICS & GYNECOLOGY

## 2017-06-24 RX ORDER — SODIUM CHLORIDE 9 MG/ML
125 INJECTION, SOLUTION INTRAVENOUS CONTINUOUS
Status: DISCONTINUED | OUTPATIENT
Start: 2017-06-24 | End: 2017-06-26

## 2017-06-24 RX ORDER — ONDANSETRON 2 MG/ML
4 INJECTION INTRAMUSCULAR; INTRAVENOUS
Status: DISCONTINUED | OUTPATIENT
Start: 2017-06-24 | End: 2017-06-26 | Stop reason: HOSPADM

## 2017-06-24 RX ORDER — DIPHENHYDRAMINE HCL 25 MG
25 CAPSULE ORAL
Status: DISCONTINUED | OUTPATIENT
Start: 2017-06-24 | End: 2017-06-26 | Stop reason: HOSPADM

## 2017-06-24 RX ORDER — ONDANSETRON 4 MG/1
4 TABLET, ORALLY DISINTEGRATING ORAL
Status: DISCONTINUED | OUTPATIENT
Start: 2017-06-24 | End: 2017-06-26 | Stop reason: HOSPADM

## 2017-06-24 RX ORDER — NALOXONE HYDROCHLORIDE 0.4 MG/ML
0.4 INJECTION, SOLUTION INTRAMUSCULAR; INTRAVENOUS; SUBCUTANEOUS AS NEEDED
Status: DISCONTINUED | OUTPATIENT
Start: 2017-06-24 | End: 2017-06-26 | Stop reason: HOSPADM

## 2017-06-24 RX ORDER — OXYTOCIN/RINGER'S LACTATE 20/1000 ML
125-500 PLASTIC BAG, INJECTION (ML) INTRAVENOUS ONCE
Status: ACTIVE | OUTPATIENT
Start: 2017-06-24 | End: 2017-06-24

## 2017-06-24 RX ORDER — SODIUM CHLORIDE 9 MG/ML
75 INJECTION, SOLUTION INTRAVENOUS CONTINUOUS
Status: DISCONTINUED | OUTPATIENT
Start: 2017-06-24 | End: 2017-06-24

## 2017-06-24 RX ORDER — ACETAMINOPHEN 325 MG/1
650 TABLET ORAL
Status: DISCONTINUED | OUTPATIENT
Start: 2017-06-24 | End: 2017-06-26 | Stop reason: HOSPADM

## 2017-06-24 RX ORDER — IBUPROFEN 800 MG/1
800 TABLET ORAL EVERY 8 HOURS
Status: DISCONTINUED | OUTPATIENT
Start: 2017-06-24 | End: 2017-06-26 | Stop reason: HOSPADM

## 2017-06-24 RX ORDER — SIMETHICONE 80 MG
80 TABLET,CHEWABLE ORAL
Status: DISCONTINUED | OUTPATIENT
Start: 2017-06-24 | End: 2017-06-26 | Stop reason: HOSPADM

## 2017-06-24 RX ORDER — DOCUSATE SODIUM 100 MG/1
100 CAPSULE, LIQUID FILLED ORAL
Status: DISCONTINUED | OUTPATIENT
Start: 2017-06-24 | End: 2017-06-26 | Stop reason: HOSPADM

## 2017-06-24 RX ORDER — SODIUM CHLORIDE 9 MG/ML
300 INJECTION, SOLUTION INTRAVENOUS CONTINUOUS
Status: DISCONTINUED | OUTPATIENT
Start: 2017-06-24 | End: 2017-06-26

## 2017-06-24 RX ORDER — HYDROCORTISONE ACETATE PRAMOXINE HCL 2.5; 1 G/100G; G/100G
CREAM TOPICAL AS NEEDED
Status: DISCONTINUED | OUTPATIENT
Start: 2017-06-24 | End: 2017-06-26 | Stop reason: HOSPADM

## 2017-06-24 RX ORDER — OXYTOCIN IN 5 % DEXTROSE 30/500 ML
PLASTIC BAG, INJECTION (ML) INTRAVENOUS
Status: COMPLETED
Start: 2017-06-24 | End: 2017-06-24

## 2017-06-24 RX ORDER — FENTANYL/BUPIVACAINE/NS/PF 2-1250MCG
1-16 PREFILLED PUMP RESERVOIR EPIDURAL CONTINUOUS
Status: DISCONTINUED | OUTPATIENT
Start: 2017-06-24 | End: 2017-06-26

## 2017-06-24 RX ADMIN — IBUPROFEN 800 MG: 800 TABLET, FILM COATED ORAL at 14:07

## 2017-06-24 RX ADMIN — CEFAZOLIN 2 G: 1 INJECTION, POWDER, FOR SOLUTION INTRAMUSCULAR; INTRAVENOUS; PARENTERAL at 00:13

## 2017-06-24 RX ADMIN — IBUPROFEN 800 MG: 800 TABLET, FILM COATED ORAL at 23:38

## 2017-06-24 RX ADMIN — SODIUM CHLORIDE 300 ML: 900 INJECTION, SOLUTION INTRAVENOUS at 08:36

## 2017-06-24 RX ADMIN — SODIUM CHLORIDE 75 ML/HR: 900 INJECTION, SOLUTION INTRAVENOUS at 08:24

## 2017-06-24 RX ADMIN — SODIUM CHLORIDE 300 ML: 900 INJECTION, SOLUTION INTRAVENOUS at 07:55

## 2017-06-24 RX ADMIN — CEFAZOLIN SODIUM 1 G: 1 INJECTION, POWDER, FOR SOLUTION INTRAMUSCULAR; INTRAVENOUS at 06:40

## 2017-06-24 RX ADMIN — SODIUM CHLORIDE, SODIUM LACTATE, POTASSIUM CHLORIDE, AND CALCIUM CHLORIDE 999 ML/HR: 600; 310; 30; 20 INJECTION, SOLUTION INTRAVENOUS at 04:30

## 2017-06-24 RX ADMIN — SODIUM CHLORIDE, SODIUM LACTATE, POTASSIUM CHLORIDE, AND CALCIUM CHLORIDE 999 ML/HR: 600; 310; 30; 20 INJECTION, SOLUTION INTRAVENOUS at 05:25

## 2017-06-24 RX ADMIN — SODIUM CHLORIDE, SODIUM LACTATE, POTASSIUM CHLORIDE, AND CALCIUM CHLORIDE 999 ML/HR: 600; 310; 30; 20 INJECTION, SOLUTION INTRAVENOUS at 10:14

## 2017-06-24 RX ADMIN — SODIUM CHLORIDE 125 ML/HR: 900 INJECTION, SOLUTION INTRAVENOUS at 09:00

## 2017-06-24 RX ADMIN — ACETAMINOPHEN 650 MG: 325 TABLET ORAL at 18:47

## 2017-06-24 RX ADMIN — SODIUM CHLORIDE, SODIUM LACTATE, POTASSIUM CHLORIDE, AND CALCIUM CHLORIDE 999 ML/HR: 600; 310; 30; 20 INJECTION, SOLUTION INTRAVENOUS at 06:25

## 2017-06-24 RX ADMIN — FENTANYL 0.2 MG/100ML-BUPIV 0.125%-NACL 0.9% EPIDURAL INJ 10 ML/HR: 2/0.125 SOLUTION at 06:47

## 2017-06-24 RX ADMIN — ONDANSETRON 4 MG: 2 INJECTION INTRAMUSCULAR; INTRAVENOUS at 05:05

## 2017-06-24 RX ADMIN — Medication 30000 MILLI-UNITS: at 11:08

## 2017-06-24 NOTE — ANESTHESIA PROCEDURE NOTES
Epidural Block    Start time: 6/24/2017 6:08 AM  End time: 6/24/2017 6:18 AM  Performed by: Mally Sharpe by: Vikki Schaefer     Pre-Procedure  Indication: labor epidural    Preanesthetic Checklist: patient identified, risks and benefits discussed, anesthesia consent, timeout performed and anesthesia consent    Timeout Time: 06:08        Epidural:   Patient position:  Seated  Prep region:  Lumbar  Prep: Betadine    Location:  L3-4    Needle and Epidural Catheter:   Needle Type:  Tuohy  Needle Gauge:  17 G  Injection Technique:  Loss of resistance using air  Attempts:  1  Catheter Size:  18 G  Catheter at Skin Depth (cm):  8  Depth in Epidural Space (cm):  4  Events: no paresthesia and negative aspiration test    Test Dose:  Lidocaine 1.5% w/ epi and negative    Assessment:   Catheter Secured:  Tegaderm and tape  Insertion:  Uncomplicated  Patient tolerance:  Patient tolerated the procedure well with no immediate complications

## 2017-06-24 NOTE — H&P
History & Physical    Name: Cassius Robertson MRN: [id number]  SSN: [id number]    YOB: 1990  Age: 32 y.o. Sex: female        Subjective:     Estimated Date of Delivery: 17  OB History    Para Term  AB Living   1 0 0 0 0 0   SAB TAB Ectopic Multiple Live Births   0 0 0 0       # Outcome Date GA Lbr Norman/2nd Weight Sex Delivery Anes PTL Lv   1 Current                   Ms. Giovana Gallegos is admitted with pregnancy at 40w2d for active labor. Prenatal course was normal. Please see prenatal records for details. Past Medical History:   Diagnosis Date    Anxiety     Asthma     exercise induced    Calculus of kidney     Celiac disease     Depression     Epilepsy ([facility] [address]     seizures when 16-15 yo, on medication for 2 years, undetermined cause, no problems since that time    Fibromyalgia     GERD (gastroesophageal reflux disease)     H/O seasonal allergies      Past Surgical History:   Procedure Laterality Date    HX TONSILLECTOMY       Social History     Occupational History    Not on file. Social History Main Topics    Smoking status: Never Smoker    Smokeless tobacco: Never Used    Alcohol use 0.6 oz/week     1 Standard drinks or equivalent per week      Comment: social    Drug use: No    Sexual activity: Yes     Partners: Male     Birth control/ protection: None      Comment: patient states she is trying to conceive at this time. Family History   Problem Relation Age of Onset    Elevated Lipids Father     Hypertension Father        Allergies   Allergen Reactions    Amoxicillin Hives    Gluten Unable to Obtain     Prior to Admission medications    Medication Sig Start Date End Date Taking? Authorizing Provider   cetirizine (ZYRTEC) 10 mg tablet Take  by mouth. Yes Historical Provider   prenatal multivit-ca-min-fe-fa tab Take  by mouth.    Yes Historical Provider        Review of Systems: Constitutional: negative  Respiratory: negative  Cardiovascular: negative  Gastrointestinal: negative  Genitourinary:negative  Hematologic/lymphatic: negative  Musculoskeletal:negative  Neurological: negative  Behavioral/Psych: negative    Objective:     Vitals:  Vitals:    06/24/17 0644 06/24/17 0646 06/24/17 0649 06/24/17 0652   BP:  93/52 90/50 97/58   Pulse:  81 99 78   Resp:       Temp:       SpO2: 97%  97%    Weight:       Height:            Physical Exam:  Patient without distress. Heart: Regular rate and rhythm  Lung: clear to auscultation throughout lung fields and normal respiratory effort  Abdomen: soft, nontender  Perineum: blood absent, amniotic fluid present  Cervical Exam: 3/80/-2  Membranes:  Spontaneous Rupture of Membranes;  Amniotic Fluid: clear fluid  Fetal Heart Rate: reactive tracing    Prenatal Labs:   Lab Results   Component Value Date/Time    Rubella, External immune 11/23/2016    GrBStrep, External Positive 05/19/2017    HBsAg, External neg 11/23/2016    HIV, External neg 11/23/2016    Gonorrhea, External neg 11/23/2016    Chlamydia, External neg 11/23/2016    ABO,Rh A+ 11/23/2016         Assessment/Plan:     Active Problems:    Pregnancy (6/23/2017)         Plan: Patient is SROM in labor epidural for pain     Signed By:  Janet Javier MD     June 24, 2017

## 2017-06-24 NOTE — L&D DELIVERY NOTE
Delivery Summary    Patient: Marlee Griffin MRN: [id number]  SSN: [id number]    YOB: 1990  Age: 32 y.o. Sex: female        Labor Events:    Labor: No    Rupture Date: 2017    Rupture Time: 10:25 PM    Rupture Type SROM    Amniotic Fluid Volume:      Amniotic Fluid Description: Clear  None    Induction: None        Augmentation: None    Labor Complications: None     Additional Complications:        Cervical Ripening:              Delivery Events:  Episiotomy: None    Laceration(s): Second degree perineal      Repaired: Yes     Number of Repair Packets:      Suture Type and Size:         Estimated Blood Loss (ml):          Information for the patient's Kisha Eid Male [[id number]]     Delivery Summary - Baby    Delivery Date: 2017   Delivery Time: 11:00 AM   Delivery Type: Vaginal, Spontaneous Delivery  Sex:  male  Gestational Age: 41w4d  Delivery Clinician:  Rachael Bonilla  Living?: Living   Delivery Location: L&D             APGARS  One minute Five minutes Ten minutes   Skin Color: 1    1       Heart Rate: 2   2         Reflex Irritability: 2   2         Muscle Tone: 2   2       Respiration: 2   2         Total: 9   9           Presentation: Vertex  Position: Left Occiput Anterior  Resuscitation Method:  Tactile Stimulation;Suctioning-bulb     Meconium Stained: None    Cord Information: 3 Vessels   Complications: Nuchal Cord With Compressions  Cord Blood Sent?:  No    Blood Gases Sent?:  No    Placenta:  Date/Time:  11:05 AM  Removal: Spontaneous      Appearance: Normal      Measurements:  Birth Weight: 6 lb 7.2 oz (2.925 kg)    Birth Length: 1' 7.75\" (0.502 m)   Head Circumference: 1' 0.4\" (0.315 m)     Chest Circumference: 1' 0.2\" (0.31 m)    Abdominal Girth: 11.42\" (0.29 m)    Other Providers:   BOAZ JOHNS;HERIBERTO BUI MIRIAM Eletha Lindsay E Obstetrician;Primary Nurse;Primary [place] Nurse; Anesthesiologist           Cord Blood Results:  Information for the patient's :  Konrad Sharma, Male [[id number]]   No results found for: Norma Myers, PCTDIG, BILI, ABORHEXT, [address]    Information for the patient's :  Konrad Sharma, Male [[id number]]   No results found for: APH, APCO2, APO2, AHCO3, ABEC, ABDC, O2ST, [place], [place], PHI, [place], PO2I, HCO3I, SO2I, IBD     Information for the patient's :  Konrad Sharma, Male [[id number]]   No results found for: EPHV, PCO2V, PO2V, HCO3V, O2STV, EBDV

## 2017-06-24 NOTE — LACTATION NOTE
This note was copied from a baby's chart. Discussed with mother her plan for feeding. Reviewed the benefits of exclusive breast milk feeding during the hospital stay. Informed her of the risks of using formula to supplement in the first few days of life as well as the benefits of successful breast milk feeding; referred her to the Breastfeeding booklet about this information. She acknowledges understanding of information reviewed and states that it is her plan to BF her infant. Will support her choice and offer additional information as needed. Pt will successfully establish breastfeeding by feeding in response to early feeding cues   or wake every 3h, will obtain deep latch, and will keep log of feedings/output. Taught to BF at hunger cues and or q 2-3 hrs and to offer 10-20 drops of hand expressed colostrum at any non-feeds.       Breast Assessment  Left Breast: Small , Medium  Left Nipple: Everted, Intact, Short  Right Breast: Small , Medium  Right Nipple: Everted, Intact  Breast- Feeding Assessment  Attends Breast-Feeding Classes: Yes (Family's feeding goals discussed, BF basics reviewed, how milk is made, normal  BF behaviors, importance of S2S bonding at breast, hand expression + BF positioning that support breast crawl BF behaviors all reviewed)  Breast-Feeding Experience: No  Breast Trauma/Surgery: No  Type/Quality: Attempted (Currentily S2S at right breast, parent's taught to offer 10-20 drops of hand express colostrum as infant mouthes, licks, explores + attempts suckling)  Lactation Consultant Visits  Breast-Feedings: Attempted breast-feeding ( [facility] visit during , assisted dyad w/comfortable Biological Nurturing positioning + hand expression)  Mother/Infant Observation  Mother Observation: Alignment, Recognizes feeding cues  Infant Observation: Opens mouth (Infant mouthes breast as mom offers hand expressed drops; dad incorporated into assisting BF dyad)  LATCH Documentation  Latch: Repeated attempts, hold nipple in mouth, stimulate to suck  Audible Swallowing: A few with stimulation  Type of Nipple: Everted (after stimulation)  Comfort (Breast/Nipple): Soft/non-tender  Hold (Positioning): Full assist, teach one side, mother does other, staff holds  [facility] Score: 7  Biological Nurturing breastfeeding principles taught. How Biological Nurturing (BN)  promotes optimal breastfeeding (BF) sessions discussed. Mother encouraged to seek comfortable semi-reclining breastfeeding positions. Infant placed frontally along maternal contour. Primitive innate feeding reflexes/behaviors of the  discussed. BN tips and techniques shared; assisted with comfortable breastfeeding positioning. Hand Expression Education:  Mom taught how to manually hand express her colostrum. Emphasized the importance of providing infant with valuable colostrum as infant rests skin to skin at breast.  Aware to avoid extended periods of non-feeding. Aware to offer 10-20+ drops of colostrum every 2-3 hours until infant is latching and nursing effectively. Taught the rationale behind this low tech but highly effective evidence based practice.

## 2017-06-25 PROCEDURE — 65270000029 HC RM PRIVATE

## 2017-06-25 PROCEDURE — 74011250637 HC RX REV CODE- 250/637: Performed by: OBSTETRICS & GYNECOLOGY

## 2017-06-25 RX ORDER — HYDROCORTISONE 1 %
CREAM (GRAM) TOPICAL 2 TIMES DAILY
Status: DISCONTINUED | OUTPATIENT
Start: 2017-06-25 | End: 2017-06-26 | Stop reason: HOSPADM

## 2017-06-25 RX ADMIN — ACETAMINOPHEN 650 MG: 325 TABLET ORAL at 23:08

## 2017-06-25 RX ADMIN — IBUPROFEN 800 MG: 800 TABLET, FILM COATED ORAL at 08:50

## 2017-06-25 RX ADMIN — DOCUSATE SODIUM 100 MG: 100 CAPSULE ORAL at 08:50

## 2017-06-25 RX ADMIN — ACETAMINOPHEN 650 MG: 325 TABLET ORAL at 03:44

## 2017-06-25 RX ADMIN — IBUPROFEN 800 MG: 800 TABLET, FILM COATED ORAL at 20:47

## 2017-06-25 NOTE — LACTATION NOTE
This note was copied from a baby's chart. Mother states baby has been latching and nursing better today. Mother states baby was very spitty yesterday. Baby was circumcised this am.  Baby very drowsy, positioned in side-lying, baby latched no sucking achieved. Encouraged mother to express drops to baby, reassured parents babies can be sleepy following circ. BF basics reviewed. Reviewed breastfeeding basics:  How milk is made and normal  breastfeeding behaviors discussed. Supply and demand,  stomach size, early feeding cues, skin to skin bonding with comfortable positioning and baby led latch-on reviewed. How to identify signs of successful breastfeeding sessions reviewed; education on assymetrical latch, signs of effective latching vs shallow, in-effective latching, normal  feeding frequency and duration and expected infant output discussed. Normal course of breastfeeding discussed including the AAP's recommendation that children receive exclusive breast milk feedings for the first six months of life with breast milk feedings to continue through the first year of life and/or beyond as complimentary table foods are added. Breastfeeding Booklet and Warm line information provided with discussion. Discussed typical  weight loss and the importance of pediatrician appointment within 24-48 hours of discharge, at 2 weeks of life and normalcy of requesting pediatric weight checks as needed in between visits. Reviewed breastfeeding techniques and positions with mother until found a position she was most comfortable with. Reminded mother of early feeding cues and that breast fed infants should be fed on demand without time restriction on the first breast until the infant seems satisfied. Then the second breast is offered. Advised mother to awaken  to feed if three hours have passed since baby last ate.  Will continue to monitor mother's progress with breastfeeding and offer assistance at any time. Hand Expression Education:  Mom taught how to manually hand express her colostrum. Emphasized the importance of providing infant with valuable colostrum as infant rests skin to skin at breast.  Aware to avoid extended periods of non-feeding. Aware to offer 10-20+ drops of colostrum every 2-3 hours until infant is latching and nursing effectively. Taught the rationale behind this low tech but highly effective evidence based practice. Pt will successfully establish breastfeeding by feeding in response to early feeding cues   or wake every 3h, will obtain deep latch, and will keep log of feedings/output. Taught to BF at hunger cues and or q 2-3 hrs and to offer 10-20 drops of hand expressed colostrum at any non-feeds.       Breast Assessment  Left Breast: Medium  Left Nipple: Everted, Large  Right Breast: Medium  Right Nipple: Everted, Intact  Breast- Feeding Assessment  Attends Breast-Feeding Classes: Yes (Family's feeding goals discussed, BF basics reviewed, how milk is made, normal  BF behaviors, importance of S2S bonding at breast, hand expression + BF positioning that support breast crawl BF behaviors all reviewed)  Breast-Feeding Experience: No  Breast Trauma/Surgery: No  Type/Quality: [facility]  Lactation Consultant Visits  Breast-Feedings: Fair (mother states baby is doing better today)  Mother/Infant Observation  Mother Observation: Breast comfortable, Recognizes feeding cues  Infant Observation: Rhythmic suck, Feeding cues  LATCH Documentation  Latch: Repeated attempts, hold nipple in mouth, stimulate to suck (baby post circ)  Audible Swallowing: A few with stimulation (mother expressing drops into baby's mouth)  Type of Nipple: Everted (after stimulation)  Comfort (Breast/Nipple): Soft/non-tender  Hold (Positioning): Full assist, teach one side, mother does other, staff holds  [facility] Score: 7

## 2017-06-26 VITALS
HEIGHT: 60 IN | BODY MASS INDEX: 29.45 KG/M2 | RESPIRATION RATE: 16 BRPM | HEART RATE: 56 BPM | TEMPERATURE: 98.3 F | WEIGHT: 150 LBS | DIASTOLIC BLOOD PRESSURE: 69 MMHG | OXYGEN SATURATION: 100 % | SYSTOLIC BLOOD PRESSURE: 114 MMHG

## 2017-06-26 PROCEDURE — 74011250637 HC RX REV CODE- 250/637: Performed by: OBSTETRICS & GYNECOLOGY

## 2017-06-26 RX ORDER — IBUPROFEN 800 MG/1
800 TABLET ORAL EVERY 8 HOURS
Qty: 60 TAB | Refills: 0 | Status: SHIPPED | OUTPATIENT
Start: 2017-06-26 | End: 2017-08-07

## 2017-06-26 RX ORDER — OXYCODONE AND ACETAMINOPHEN 5; 325 MG/1; MG/1
1-2 TABLET ORAL
Qty: 28 TAB | Refills: 0 | Status: SHIPPED | OUTPATIENT
Start: 2017-06-26 | End: 2017-08-07

## 2017-06-26 RX ADMIN — Medication: at 02:34

## 2017-06-26 RX ADMIN — ACETAMINOPHEN 650 MG: 325 TABLET ORAL at 06:23

## 2017-06-26 RX ADMIN — IBUPROFEN 800 MG: 800 TABLET, FILM COATED ORAL at 06:23

## 2017-06-26 RX ADMIN — HYDROCORTISONE: 1 CREAM TOPICAL at 02:34

## 2017-06-26 NOTE — DISCHARGE INSTRUCTIONS
POST DELIVERY DISCHARGE INSTRUCTIONS    Name: Zee Wen  YOB: 1990  Primary Diagnosis: Active Problems:    Pregnancy (2017)        General:     Diet/Diet Restrictions:  Eight 8-ounce glasses of fluid daily (water, juices); avoid excessive caffeine intake. Meals/snacks as desired which are high in fiber and carbohydrates and low in fat and cholesterol. Physical Activity / Restrictions / Safety:     Avoid heavy lifting, no more that 8 lbs. For 2-3 weeks; No driving while taking narcotic pain medication. Post  patients should not drive until pain free. No intercourse 4-6 weeks, no douching or tampon use. May resume exercise in 6 weeks. Discharge Instructions/Special Treatment/Home Care Needs:     Continue prenatal vitamins. Continue to use squirt bottle with warm water on your episiotomy after each bathroom use until bleeding stops. If steri-strips applied to your incision, remove in 7 days. Take stool softeners daily. Call your doctor for the following:     Fever over 101 degrees by mouth. Vaginal bleeding heavier than a normal menstrual period or lost larger than a golf ball. Red streaks or increased swelling of legs, painful red streaks on your breast.  Painful urination, or increased pain, redness or discharge with your incision. Pain Management:     Pain Management:   Take Acetaminophen (Tylenol) or Ibuprofen (Advil, Motrin), as directed for pain. Use a warm Sitz bath 3 times daily to relieve episiotomy or hemorrhoidal discomfort. Heating pad to  incision as needed. For hemorrhoidal discomfort, use Tucks and Anusol cream as needed and directed.     Follow-Up Care:     Appointment with MD:   Follow-up Appointments   Procedures    FOLLOW UP VISIT Appointment in: 6 Weeks     Standing Status:   Standing     Number of Occurrences:   1     Order Specific Question:   Appointment in     Answer:   Ernesto Cortés     Telephone number: [phone]    Signed By: Karen Pate Kala Fish MD                                                                                                   Date: 6/26/2017 Time: 10:30 AM

## 2017-06-26 NOTE — DISCHARGE SUMMARY
Obstetrical Discharge Summary     Name: Yohana Mehta MRN: [id number]  SSN: [id number]    YOB: 1990  Age: 32 y.o. Sex: female      Admit Date: 2017    Discharge Date: 2017     Admitting Physician: Ann Causey MD     Attending Physician:  Ann Causey MD     * Admission Diagnoses: MATERNITY;MATERNITY; Pregnancy    * Discharge Diagnoses:   Information for the patient's :  Roxana Cerna, Male [[id number]]   Delivery of a 6 lb 7.2 oz (2.925 kg) male infant via Vaginal, Spontaneous Delivery on 2017 at 11:00 AM  by . Apgars were 9 and 9. Additional Diagnoses:   Hospital Problems as of 2017  Date Reviewed: 2017          Codes Class Noted - Resolved POA    Pregnancy ICD-10-CM: Z33.1  ICD-9-CM: V22.2  2017 - Present Unknown             Lab Results   Component Value Date/Time    Rubella, External immune 2016    GrBStrep, External Positive 2017    ABO,Rh A+ 2016      Immunization History   Administered Date(s) Administered    Tdap 2017       * Procedures:     * Discharge Condition: stable    * Hospital Course: Normal hospital course following the delivery. * Disposition: home with office follow-up    Discharge Medications:   Current Discharge Medication List      START taking these medications    Details   ibuprofen (MOTRIN) 800 mg tablet Take 1 Tab by mouth every eight (8) hours. Qty: 60 Tab, Refills: 0      oxyCODONE-acetaminophen (PERCOCET) 5-325 mg per tablet Take 1-2 Tabs by mouth every four (4) hours as needed for Pain. Max Daily Amount: 12 Tabs. Qty: 28 Tab, Refills: 0             * Follow-up Care/Patient Instructions:   Activity: activity as tolerated  Diet: general  Wound Care: as directed    Follow-up Information     Follow up With Details Comments MD Susan   [address]  [address] [place] [address]  [phone]      Ann Causey MD In 6 weeks  [address] [place] [address] [address]  [phone]             Signed By:  Karan Biggs MD     June 26, 2017

## 2017-06-26 NOTE — LACTATION NOTE
This note was copied from a baby's chart. Biological Nurturing breastfeeding principles taught. How Biological Nurturing (BN)  promotes optimal breastfeeding (BF) sessions discussed. Mother encouraged to seek comfortable semi-reclining breastfeeding positions. Infant placed frontally along maternal contour. Primitive innate feeding reflexes/behaviors of the  discussed. BN tips and techniques shared; assisted with comfortable breastfeeding positioning. Chart shows numerous feedings, void, stool WNL. Discussed importance of monitoring outputs and feedings on first week of life. Discussed ways to tell if baby is  getting enough breast milk, ie  voids and stools, change in color of stool, and return to birth wt within 2 weeks. Follow up with pediatrician visit for weight check in 1-2 days (per AAP guidelines.)  Encouraged to call Warm Line  [phone] or [facility] at [phone] for any questions/problems that arise. Mother also given breastfeeding support group dates and times for any future needsAnticipatory guidance given. Questions answered. Discussed signs of baby's allergy, excema. Discussed engorgement management, when breast are soft and flat you are making more milk than when hard and engorged. If you should have to take a medication and MD says can't breast feed contact lactation office. Breast feed if you or the baby gets sick to pass along natural antibiotics. Mom frustrated that baby was not eating well. SHowed Mom biologic position and baby latched with good sucking bursts. Had Mom finger feed baby drops of EBM to entice her to breastfeed.

## 2017-08-07 ENCOUNTER — OFFICE VISIT (OUTPATIENT)
Dept: OBGYN CLINIC | Age: 27
End: 2017-08-07

## 2017-08-07 VITALS
HEIGHT: 60 IN | BODY MASS INDEX: 25.72 KG/M2 | WEIGHT: 131 LBS | SYSTOLIC BLOOD PRESSURE: 118 MMHG | DIASTOLIC BLOOD PRESSURE: 70 MMHG

## 2017-08-07 RX ORDER — ACETAMINOPHEN AND CODEINE PHOSPHATE 120; 12 MG/5ML; MG/5ML
1 SOLUTION ORAL DAILY
Qty: 3 PACKAGE | Refills: 0 | Status: SHIPPED | OUTPATIENT
Start: 2017-08-07 | End: 2017-11-09 | Stop reason: SDUPTHER

## 2017-08-07 NOTE — PROGRESS NOTES
Postpartum evaluation    Denise Ramirez is a 32 y.o. female who presents for a postpartum exam.     She is now six weeks post normal spontaneous vaginal delivery. Her baby is doing well. She has had no menses since delivery. She has had the following significant problems since her delivery: none    The patient is breast feeding without difficulty. The patient would like to use Micronor for birth control. She is currently taking: PNVs.     She is due for her next AE in 3 months.      Visit Vitals    /70    Ht 5' (1.524 m)    Wt 131 lb (59.4 kg)    Breastfeeding Yes    BMI 25.58 kg/m2       PHYSICAL EXAMINATION    Constitutional  · Appearance: well-nourished, well developed, alert, in no acute distress    HENT  · Head and Face: appears normal    Neck  · Inspection/Palpation: normal appearance, no masses or tenderness  · Lymph Nodes: no lymphadenopathy present  · Thyroid: gland size normal, nontender, no nodules or masses present on palpation    Breasts  · Inspection of Breasts: breasts symmetrical, no skin changes, no discharge present, nipple appearance normal, no skin retraction present  · Palpation of Breasts and Axillae: no masses present on palpation, no breast tenderness  · Axillary Lymph Nodes: no lymphadenopathy present    Gastrointestinal  · Abdominal Examination: abdomen non-tender to palpation, normal bowel sounds, no masses present  · Liver and spleen: no hepatomegaly present, spleen not palpable  · Hernias: no hernias identified    Genitourinary  · External Genitalia: normal appearance for age, no discharge present, no tenderness present, no inflammatory lesions present, no masses present, no atrophy present  · Vagina: normal vaginal vault without central or paravaginal defects, no discharge present, no inflammatory lesions present, no masses present  · Bladder: non-tender to palpation  · Urethra: appears normal  · Cervix: normal   · Uterus: normal size, shape and consistency  · Adnexa: no adnexal tenderness present, no adnexal masses present  · Perineum: perineum within normal limits, no evidence of trauma, no rashes or skin lesions present  · Anus: anus within normal limits, no hemorrhoids present  · Inguinal Lymph Nodes: no lymphadenopathy present    Skin  · General Inspection: no rash, no lesions identified    Neurologic/Psychiatric  · Mental Status:  · Orientation: grossly oriented to person, place and time  · Mood and Affect: mood normal, affect appropriate    Assessment:  Normal postpartum check    Plan:  RTO for AE.  micronor

## 2017-08-07 NOTE — PATIENT INSTRUCTIONS
Nutrition for Breastfeeding Mothers: Care Instructions  Your Care Instructions  When a woman breastfeeds her baby, she needs more nutrients to keep herself healthy and to make the baby's milk. Breastfeeding helps build the bond between you and your baby. It gives your baby excellent health benefits. A healthy diet includes eating a variety of foods from the basic food groups: grains, vegetables, fruits, milk and milk products (such as cheese and yogurt), and meat and dried beans. Eating well during breastfeeding will ensure that you stay healthy and your baby grows and develops normally. Follow-up care is a key part of your treatment and safety. Be sure to make and go to all appointments, and call your doctor if you are having problems. It's also a good idea to know your test results and keep a list of the medicines you take. How can you care for yourself at home? · Include 3 to 4 cups of nonfat or low-fat milk or milk products in your diet every day. These include:  ¨ Milk (8 ounces equals 1 cup). ¨ Ice cream (1½ cups equals 1 cup of milk). ¨ Cheese (1½ ounces of cheese equals 1 cup). ¨ Yogurt (8 ounces equals 1 cup). · Eat at least 7 ounces of grains, such as cereals, breads, crackers, rice, or pasta, every day. One ounce is about 1 slice of bread, 1 cup of breakfast cereal, or ½ cup of cooked rice, cereal, or pasta. · Eat 3 cups of vegetables each day. Choices include:  ¨ Dark-green vegetables such as broccoli and spinach. ¨ Orange vegetables such as carrots and sweet potatoes. ¨ Dried beans (such as mejias and kidney beans) and peas (such as lentils). · Every day, eat 2 cups of fresh, frozen, or canned fruit. · Eat 6½ ounces each day of protein, such as chicken, fish, lean meat, eggs, peanut butter, dried beans and peas, nuts, and seeds. One egg, 1 tablespoon of peanut butter, or ½ ounce of nuts or seeds equals 1 ounce of protein. A ½ cup of cooked beans equals 2 ounces of protein.   · Drink plenty of fluids, enough so that your urine is light yellow or clear like water. If you have kidney, heart, or liver disease and have to limit fluids, talk with your doctor before you increase the amount of fluids you drink. · Limit caffeine products, such as coffee, tea, chocolate, and some sodas. Caffeine can pass to your baby through breast milk. It may cause fussiness and sleep problems in babies. · Your doctor may recommend a vitamin supplement. Take it as recommended. · Consider joining a breastfeeding support group. These are offered at many hospitals and birthing centers by nurses, nurse-midwives, or lactation consultants. When should you call for help? Watch closely for changes in your health, and be sure to contact your doctor if:  · You feel that you are not making enough milk for your baby. · You are losing a lot of weight. · You do not think your baby is gaining enough weight. · You would like help to plan a healthy diet. Where can you learn more? Go to http://karolyn-brandin.info/. Enter P234 in the search box to learn more about \"Nutrition for Breastfeeding Mothers: Care Instructions. \"  Current as of: May 30, 2016  Content Version: 11.3  © 9224-0471 [employer]. Care instructions adapted under license by [employer] (which disclaims liability or warranty for this information). If you have questions about a medical condition or this instruction, always ask your healthcare professional. [place] [address] any warranty or liability for your use of this information.

## 2017-10-11 ENCOUNTER — OFFICE VISIT (OUTPATIENT)
Dept: FAMILY MEDICINE CLINIC | Age: 27
End: 2017-10-11

## 2017-10-11 VITALS
RESPIRATION RATE: 18 BRPM | HEIGHT: 60 IN | BODY MASS INDEX: 24.54 KG/M2 | WEIGHT: 125 LBS | DIASTOLIC BLOOD PRESSURE: 77 MMHG | TEMPERATURE: 99.1 F | SYSTOLIC BLOOD PRESSURE: 118 MMHG | OXYGEN SATURATION: 98 % | HEART RATE: 68 BPM

## 2017-10-11 DIAGNOSIS — N61.0 MASTITIS: Primary | ICD-10-CM

## 2017-10-11 DIAGNOSIS — R35.0 FREQUENCY OF URINATION: ICD-10-CM

## 2017-10-11 LAB
BILIRUB UR QL STRIP: NEGATIVE
GLUCOSE UR-MCNC: NEGATIVE MG/DL
KETONES P FAST UR STRIP-MCNC: NEGATIVE MG/DL
PH UR STRIP: 7 [PH] (ref 4.6–8)
PROT UR QL STRIP: NEGATIVE MG/DL
SP GR UR STRIP: 1.01 (ref 1–1.03)
UA UROBILINOGEN AMB POC: ABNORMAL (ref 0.2–1)
URINALYSIS CLARITY POC: CLEAR
URINALYSIS COLOR POC: YELLOW
URINE BLOOD POC: ABNORMAL
URINE LEUKOCYTES POC: ABNORMAL
URINE NITRITES POC: NEGATIVE

## 2017-10-11 RX ORDER — CLINDAMYCIN HYDROCHLORIDE 300 MG/1
300 CAPSULE ORAL 3 TIMES DAILY
Qty: 30 CAP | Refills: 0 | Status: SHIPPED | OUTPATIENT
Start: 2017-10-11 | End: 2017-10-21

## 2017-10-11 NOTE — PROGRESS NOTES
Identified pt with two pt identifiers(name and ). Chief Complaint   Patient presents with    Bladder Infection     began monday night    Breast Problem     Breast feeding and has been clogged for 2 days        Health Maintenance Due   Topic    INFLUENZA AGE 9 TO ADULT        Wt Readings from Last 3 Encounters:   10/11/17 125 lb (56.7 kg)   17 131 lb (59.4 kg)   17 150 lb (68 kg)     Temp Readings from Last 3 Encounters:   10/11/17 99.1 °F (37.3 °C) (Oral)   17 98.3 °F (36.8 °C)   10/25/16 99.3 °F (37.4 °C) (Oral)     BP Readings from Last 3 Encounters:   10/11/17 118/77   17 118/70   17 114/69     Pulse Readings from Last 3 Encounters:   10/11/17 68   17 (!) 56   10/25/16 80         Learning Assessment:  :     Learning Assessment 10/16/2015   PRIMARY LEARNER Patient   BARRIERS PRIMARY LEARNER NONE   PRIMARY LANGUAGE ENGLISH   LEARNER PREFERENCE PRIMARY DEMONSTRATION   ANSWERED BY patient   RELATIONSHIP SELF       Depression Screening:  :     PHQ over the last two weeks 2017   Little interest or pleasure in doing things Not at all   Feeling down, depressed or hopeless Not at all   Total Score PHQ 2 0       Fall Risk Assessment:  :     No flowsheet data found. Abuse Screening:  :     No flowsheet data found. Coordination of Care Questionnaire:  :     1) Have you been to an emergency room, urgent care clinic since your last visit? no   Hospitalized since your last visit? no             2) Have you seen or consulted any other health care providers outside of [address] [place] [address] since your last visit? no  (Include any pap smears or colon screenings in this section.)    3) Do you have an Advance Directive on file? no  Are you interested in receiving information about Advance Directives? no    Reviewed record in preparation for visit and have obtained necessary documentation. Medication reconciliation up to date and corrected with patient at this time.

## 2017-10-11 NOTE — MR AVS SNAPSHOT
Visit Information Date & Time Provider Department Dept. Phone Encounter #  
 10/11/2017  3:45 PM [facility] [phone] [id number] Follow-up Instructions Return if symptoms worsen or fail to improve. Upcoming Health Maintenance Date Due INFLUENZA AGE 9 TO ADULT 8/1/2017 PAP AKA CERVICAL CYTOLOGY 8/19/2019 DTaP/Tdap/Td series (2 - Td) 3/30/2027 Allergies as of 10/11/2017  Review Complete On: 10/11/2017 By: Vy Dale NP Severity Noted Reaction Type Reactions Amoxicillin  08/13/2014    Hives Gluten  08/13/2014    Unable to Obtain Current Immunizations  Reviewed on 6/25/2017 Name Date Tdap 3/30/2017 Not reviewed this visit You Were Diagnosed With   
  
 Codes Comments Mastitis    -  Primary ICD-10-CM: N61.0 ICD-9-CM: 611.0 Frequency of urination     ICD-10-CM: R35.0 ICD-9-CM: 788.41 Vitals BP Pulse Temp Resp Height(growth percentile) Weight(growth percentile) 118/77 (BP 1 Location: Right arm, BP Patient Position: Sitting) 68 99.1 °F (37.3 °C) (Oral) 18 5' (1.524 m) 125 lb (56.7 kg) SpO2 BMI OB Status Smoking Status 98% 24.41 kg/m2 Recent pregnancy Never Smoker BMI and BSA Data Body Mass Index Body Surface Area  
 24.41 kg/m 2 1.55 m 2 Preferred Pharmacy Pharmacy Name Phone [id number] [address] [phone] Your Updated Medication List  
  
   
This list is accurate as of: 10/11/17  3:59 PM.  Always use your most recent med list.  
  
  
  
  
 clindamycin 300 mg capsule Commonly known as:  CLEOCIN Take 1 Cap by mouth three (3) times daily for 10 days. norethindrone 0.35 mg Tab Commonly known as:  Meliton & Meliton Take 1 Tab by mouth daily. prenatal multivit-ca-min-fe-fa Tab Take  by mouth. Prescriptions Sent to Pharmacy Refills clindamycin (CLEOCIN) 300 mg capsule 0 Sig: Take 1 Cap by mouth three (3) times daily for 10 days. Class: Normal  
 Pharmacy: [facility] [address]  #: [phone] Route: Oral  
  
We Performed the Following AMB POC URINALYSIS DIP STICK AUTO W/O MICRO [99252 CPT(R)] Follow-up Instructions Return if symptoms worsen or fail to improve. Patient Instructions Mastitis: Care Instructions Your Care Instructions Mastitis is an inflammation of the breast. It occurs most often in women who are breastfeeding, but it can affect any woman. Mastitis can be caused by poor milk flow from the breast. When milk builds up in a breast, it leaks into the nearby breast tissue. Infection can also develop when the nipples become cracked or irritated. The tissue can then become infected with bacteria. Antibiotics can usually cure mastitis. For women who are nursing, continued breastfeeding (or pumping) can help. If mastitis is not treated, a pocket of pus may form in the breast and need to be drained. Follow-up care is a key part of your treatment and safety. Be sure to make and go to all appointments, and call your doctor if you are having problems. Its also a good idea to know your test results and keep a list of the medicines you take. How can you care for yourself at home? · If your doctor prescribed antibiotics, take them as directed. Do not stop taking them just because you feel better. You need to take the full course of antibiotics. · If you are breastfeeding, continue breastfeeding or pumping breast milk. It is important to empty your breasts regularly, every 2 to 3 hours while you are awake. These tips may help: ¨ Before breastfeeding, place a warm, wet washcloth over your breast for about 15 minutes. Try this at least 3 times a day. This increases milk flow in the breast. Massaging the affected breast may also increase milk flow. ¨ Breastfeed on both sides. Try to start with your healthy breast first. Then, after your milk is flowing, breastfeed from the affected breast until it feels soft. You should empty this breast completely. Then switch back to the healthy breast and breastfeed until your baby has finished. ¨ Pump or hand-express a small amount of breast milk before breastfeeding if your breasts are too full with milk. This will make your breasts less full and may make it easier for your baby to latch on to your breast. 
¨ Pump or express milk from the affected breast if it hurts too much to breastfeed. · Take an over-the-counter pain medicine, such as acetaminophen (Tylenol) or ibuprofen (Advil, Motrin) to relieve pain and fever. Read and follow all instructions on the label. · Do not take two or more pain medicines at the same time unless the doctor told you to. Many pain medicines have acetaminophen, which is Tylenol. Too much acetaminophen (Tylenol) can be harmful. · Rest as much as possible. · Drink extra fluids. · If pus is draining from your infected breast, wash the nipple gently and let it air-dry before you put your bra back on. A disposable breast pad placed in the bra cup may absorb the pus. When should you call for help? Call your doctor now or seek immediate medical care if: · Any part of your breast becomes increasingly red, painful, swollen, or hot. · You have a new or higher fever. · You have new chills or body aches. Watch closely for changes in your health, and be sure to contact your doctor if: 
· You do not get better within 2 days. Where can you learn more? Go to http://karolyn-brandin.info/. Enter Y207 in the search box to learn more about \"Mastitis: Care Instructions. \" Current as of: May 30, 2016 Content Version: 11.3 © 8852-5992 [employer].  Care instructions adapted under license by [employer] (which disclaims liability or warranty for this information). If you have questions about a medical condition or this instruction, always ask your healthcare professional. [address] any warranty or liability for your use of this information. Introducing [facility]! Dear Susan Sterling: Thank you for requesting a Snappy shuttle account. Our records indicate that you already have an active Snappy shuttle account. You can access your account anytime at [email or web address]. [employer] Did you know that you can access your hospital and ER discharge instructions at any time in Snappy shuttle? You can also review all of your test results from your hospital stay or ER visit. Additional Information If you have questions, please visit the Frequently Asked Questions section of the Snappy shuttle website at [email or web address] Remember, Snappy shuttle is NOT to be used for urgent needs. For medical emergencies, dial 911. Now available from your iPhone and Android! Please provide this summary of care documentation to your next provider. Your primary care clinician is listed as Karley Schmitt. If you have any questions after today's visit, please call [phone].

## 2017-10-11 NOTE — PATIENT INSTRUCTIONS
Mastitis: Care Instructions  Your Care Instructions  Mastitis is an inflammation of the breast. It occurs most often in women who are breastfeeding, but it can affect any woman. Mastitis can be caused by poor milk flow from the breast. When milk builds up in a breast, it leaks into the nearby breast tissue. Infection can also develop when the nipples become cracked or irritated. The tissue can then become infected with bacteria. Antibiotics can usually cure mastitis. For women who are nursing, continued breastfeeding (or pumping) can help. If mastitis is not treated, a pocket of pus may form in the breast and need to be drained. Follow-up care is a key part of your treatment and safety. Be sure to make and go to all appointments, and call your doctor if you are having problems. Its also a good idea to know your test results and keep a list of the medicines you take. How can you care for yourself at home? · If your doctor prescribed antibiotics, take them as directed. Do not stop taking them just because you feel better. You need to take the full course of antibiotics. · If you are breastfeeding, continue breastfeeding or pumping breast milk. It is important to empty your breasts regularly, every 2 to 3 hours while you are awake. These tips may help:  ¨ Before breastfeeding, place a warm, wet washcloth over your breast for about 15 minutes. Try this at least 3 times a day. This increases milk flow in the breast. Massaging the affected breast may also increase milk flow. ¨ Breastfeed on both sides. Try to start with your healthy breast first. Then, after your milk is flowing, breastfeed from the affected breast until it feels soft. You should empty this breast completely. Then switch back to the healthy breast and breastfeed until your baby has finished. ¨ Pump or hand-express a small amount of breast milk before breastfeeding if your breasts are too full with milk.  This will make your breasts less full and may make it easier for your baby to latch on to your breast.  ¨ Pump or express milk from the affected breast if it hurts too much to breastfeed. · Take an over-the-counter pain medicine, such as acetaminophen (Tylenol) or ibuprofen (Advil, Motrin) to relieve pain and fever. Read and follow all instructions on the label. · Do not take two or more pain medicines at the same time unless the doctor told you to. Many pain medicines have acetaminophen, which is Tylenol. Too much acetaminophen (Tylenol) can be harmful. · Rest as much as possible. · Drink extra fluids. · If pus is draining from your infected breast, wash the nipple gently and let it air-dry before you put your bra back on. A disposable breast pad placed in the bra cup may absorb the pus. When should you call for help? Call your doctor now or seek immediate medical care if:  · Any part of your breast becomes increasingly red, painful, swollen, or hot. · You have a new or higher fever. · You have new chills or body aches. Watch closely for changes in your health, and be sure to contact your doctor if:  · You do not get better within 2 days. Where can you learn more? Go to http://karolyn-brandin.info/. Enter Y207 in the search box to learn more about \"Mastitis: Care Instructions. \"  Current as of: May 30, 2016  Content Version: 11.3  © 8958-6755 [employer]. Care instructions adapted under license by "[employer] (which disclaims liability or warranty for this information). If you have questions about a medical condition or this instruction, always ask your healthcare professional. [place] [address] any warranty or liability for your use of this information.

## 2017-10-11 NOTE — PROGRESS NOTES
HISTORY OF PRESENT ILLNESS  Lashawn Beverly is a 32 y.o. female. HPI  Pt presents with \"possible bladder infection and possible mastitis\"    Monday night, 2 nights ago, she started feeling like she had a UTI. She was having some burning with urination, as well as urinary frequency. She is breast feeding her 15 month old at the time, and also is worried that she has mastitis. Pt states that she has a blocked milk duct in her left breast, that has been present for about 2 days. She has been able to express some milk from the breast, but she can feel like clogged area in her breast.  The area of hardness in her breast has increased in size over the past day. No redness or warmth tot he breast.  Low grade fever noted today. Review of Systems   Constitutional: Positive for fever. HENT: Negative for congestion. Respiratory: Negative for cough. Genitourinary: Positive for dysuria and frequency. Physical Exam   Constitutional: She is oriented to person, place, and time. She appears well-developed and well-nourished. HENT:   Head: Normocephalic and atraumatic. Cardiovascular: Normal rate, regular rhythm and normal heart sounds. Pulmonary/Chest: Effort normal and breath sounds normal.       Abdominal: There is no CVA tenderness. Neurological: She is alert and oriented to person, place, and time. Skin: Skin is warm and dry. Psychiatric: She has a normal mood and affect. Her behavior is normal.       ASSESSMENT and PLAN    ICD-10-CM ICD-9-CM    1. Mastitis N61.0 611.0 clindamycin (CLEOCIN) 300 mg capsule   2. Frequency of urination R35.0 788.41 AMB POC URINALYSIS DIP STICK AUTO W/O MICRO      clindamycin (CLEOCIN) 300 mg capsule     Informed patient to take antibiotics as prescribed with food. Educated about continuing to feed from breast, to aid in blockage. Educated about warm compress to area, and masaging the area.   Educated about always notifying office should symptoms continue and/or worsen. Pt informed to return to office with worsening of symptoms, or PRN with any questions or concerns. Pt verbalizes understanding of plan of care and denies further questions or concerns at this time.

## 2017-10-23 ENCOUNTER — OFFICE VISIT (OUTPATIENT)
Dept: FAMILY MEDICINE CLINIC | Age: 27
End: 2017-10-23

## 2017-10-23 VITALS
OXYGEN SATURATION: 99 % | BODY MASS INDEX: 24.54 KG/M2 | RESPIRATION RATE: 18 BRPM | HEIGHT: 60 IN | SYSTOLIC BLOOD PRESSURE: 105 MMHG | TEMPERATURE: 99 F | WEIGHT: 125 LBS | DIASTOLIC BLOOD PRESSURE: 88 MMHG | HEART RATE: 101 BPM

## 2017-10-23 DIAGNOSIS — J01.90 ACUTE RHINOSINUSITIS: Primary | ICD-10-CM

## 2017-10-23 DIAGNOSIS — N61.0 ACUTE MASTITIS OF LEFT BREAST: ICD-10-CM

## 2017-10-23 RX ORDER — CEFDINIR 300 MG/1
300 CAPSULE ORAL 2 TIMES DAILY
Qty: 20 CAP | Refills: 0 | Status: SHIPPED | OUTPATIENT
Start: 2017-10-23 | End: 2017-11-02

## 2017-10-23 RX ORDER — CLINDAMYCIN HYDROCHLORIDE 300 MG/1
300 CAPSULE ORAL 3 TIMES DAILY
Qty: 30 CAP | Refills: 0 | Status: SHIPPED | OUTPATIENT
Start: 2017-10-23 | End: 2017-11-02

## 2017-10-23 NOTE — PROGRESS NOTES
Identified pt with two pt identifiers(name and ). Chief Complaint   Patient presents with    Nasal Congestion     began saturday    Sore Throat    Cough    Breast Problem     Mastitis    Immunization/Injection     wondering if she can get the flu shot        Health Maintenance Due   Topic    INFLUENZA AGE 9 TO ADULT        Wt Readings from Last 3 Encounters:   10/23/17 125 lb (56.7 kg)   10/11/17 125 lb (56.7 kg)   17 131 lb (59.4 kg)     Temp Readings from Last 3 Encounters:   10/23/17 99 °F (37.2 °C) (Oral)   10/11/17 99.1 °F (37.3 °C) (Oral)   17 98.3 °F (36.8 °C)     BP Readings from Last 3 Encounters:   10/23/17 105/88   10/11/17 118/77   17 118/70     Pulse Readings from Last 3 Encounters:   10/23/17 (!) 101   10/11/17 68   17 (!) 56         Learning Assessment:  :     Learning Assessment 10/16/2015   PRIMARY LEARNER Patient   BARRIERS PRIMARY LEARNER NONE   PRIMARY LANGUAGE ENGLISH   LEARNER PREFERENCE PRIMARY DEMONSTRATION   ANSWERED BY patient   RELATIONSHIP SELF       Depression Screening:  :     PHQ over the last two weeks 2017   Little interest or pleasure in doing things Not at all   Feeling down, depressed or hopeless Not at all   Total Score PHQ 2 0       Fall Risk Assessment:  :     No flowsheet data found. Abuse Screening:  :     No flowsheet data found. Coordination of Care Questionnaire:  :     1) Have you been to an emergency room, urgent care clinic since your last visit? no   Hospitalized since your last visit? no             2) Have you seen or consulted any other health care providers outside of [address] [place] [address] since your last visit? no  (Include any pap smears or colon screenings in this section.)    3) Do you have an Advance Directive on file? no  Are you interested in receiving information about Advance Directives? no    Reviewed record in preparation for visit and have obtained necessary documentation.   Medication reconciliation up to date and corrected with patient at this time.

## 2017-10-23 NOTE — PROGRESS NOTES
Subjective:      Loren Garcia is a 32 y.o. female here with complaint of congestion, productive cough, post nasal drainage, bilateral sinus pain, swollen glands, and nasal discharge for 3 days. She denies a history of nausea, shortness of breath and vomiting. Reports that her body hurts all over. Evaluation to date: none   Treatment to date: Tylenol, saline nasal spray     States that she awoke this morning with her left breast clogged. She is currently breastfeeding. States that she had a fever throughout the night with Tmax \"100.something\". Has had prior episode of mastitis treated with clindamycin. Current Outpatient Prescriptions   Medication Sig Dispense Refill    norethindrone (MICRONOR) 0.35 mg tab Take 1 Tab by mouth daily. 3 Package 0    prenatal multivit-ca-min-fe-fa tab Take  by mouth. Allergies   Allergen Reactions    Amoxicillin Hives    Gluten Unable to Obtain       Past Medical History:   Diagnosis Date    Anxiety     Asthma     exercise induced    Calculus of kidney     Celiac disease     Depression     Epilepsy ([facility] [address]     seizures when 16-15 yo, on medication for 2 years, undetermined cause, no problems since that time    Fibromyalgia     GERD (gastroesophageal reflux disease)     H/O seasonal allergies        Social History   Substance Use Topics    Smoking status: Never Smoker    Smokeless tobacco: Never Used    Alcohol use 0.6 oz/week     1 Standard drinks or equivalent per week      Comment: social        Review of Systems  Pertinent items are noted in HPI.      Objective:     Visit Vitals    /88 (BP 1 Location: Right arm, BP Patient Position: Sitting)  Comment: auto cuff    Pulse (!) 101    Temp 99 °F (37.2 °C) (Oral)    Resp 18    Ht 5' (1.524 m)    Wt 125 lb (56.7 kg)    SpO2 99%    BMI 24.41 kg/m2      General appearance - alert, well appearing, and in no distress  Eyes - pupils equal and reactive, extraocular eye movements intact, sclera anicteric  Ears - bilateral TM's and external ear canals normal  Nose - mucosal congestion, mucosal erythema and sinus tenderness noted frontal and maxillary sinuses   Oropharyngx - mucous membranes moist, pharynx normal without lesions and erythematous  Neck - bilateral symmetric anterior and posterior cervical adenopathy  Chest - clear to auscultation, no wheezes, rales or rhonchi, symmetric air entry, no tachypnea, retractions or cyanosis  Heart - normal rate, regular rhythm, normal S1, S2, no murmurs, rubs, clicks or gallops  Breast - left breast with erythema and tenderness     Assessment/Plan:   Zaira Johnson is a 32 y.o. female seen for:     1. Acute rhinosinusitis:   - cefdinir (OMNICEF) 300 mg capsule; Take 1 Cap by mouth two (2) times a day for 10 days. Dispense: 20 Cap; Refill: 0  - clindamycin (CLEOCIN) 300 mg capsule; Take 1 Cap by mouth three (3) times daily for 10 days. Dispense: 30 Cap; Refill: 0  - Symptomatic therapy suggested: push fluids, rest and saline nasal sprays as needed for congestion    2. Acute mastitis of left breast:   - clindamycin (CLEOCIN) 300 mg capsule; Take 1 Cap by mouth three (3) times daily for 10 days. Dispense: 30 Cap; Refill: 0  - Push fluids, continue with breastfeeding, breast massage to help and stimulate flow, warm compresses to the breast as needed     I have discussed the diagnosis with the patient and the intended plan as seen in the above orders. The patient has received an after-visit summary and questions were answered concerning future plans. I have discussed medication side effects and warnings with the patient as well. Patient verbalizes understanding of plan of care and denies further questions or concerns at this time. Informed patient to return to the office if symptoms worsen or if new symptoms arise. Follow-up Disposition:  Return if symptoms worsen or fail to improve.

## 2017-10-23 NOTE — MR AVS SNAPSHOT
Visit Information Date & Time Provider Department Dept. Phone Encounter #  
 10/23/2017  9:00 AM Daveddie TrippAlfa [phone] [id number] Follow-up Instructions Return if symptoms worsen or fail to improve. Upcoming Health Maintenance Date Due INFLUENZA AGE 9 TO ADULT 8/1/2017 PAP AKA CERVICAL CYTOLOGY 8/19/2019 DTaP/Tdap/Td series (2 - Td) 3/30/2027 Allergies as of 10/23/2017  Review Complete On: 10/23/2017 By: Dav Tripp MD  
  
 Severity Noted Reaction Type Reactions Amoxicillin  08/13/2014    Hives Gluten  08/13/2014    Unable to Obtain Current Immunizations  Reviewed on 6/25/2017 Name Date Tdap 3/30/2017 Not reviewed this visit You Were Diagnosed With   
  
 Codes Comments Acute rhinosinusitis    -  Primary ICD-10-CM: J01.90 ICD-9-CM: 461.9 Acute mastitis of left breast     ICD-10-CM: N61.0 ICD-9-CM: 611.0 Vitals BP Pulse Temp Resp Height(growth percentile) Weight(growth percentile) 105/88 (BP 1 Location: Right arm, BP Patient Position: Sitting) (!) 101 99 °F (37.2 °C) (Oral) 18 5' (1.524 m) 125 lb (56.7 kg) SpO2 BMI OB Status Smoking Status 99% 24.41 kg/m2 Recent pregnancy Never Smoker BMI and BSA Data Body Mass Index Body Surface Area  
 24.41 kg/m 2 1.55 m 2 Preferred Pharmacy Pharmacy Name Phone [id number] [address] [phone] Your Updated Medication List  
  
   
This list is accurate as of: 10/23/17  9:34 AM.  Always use your most recent med list.  
  
  
  
  
 cefdinir 300 mg capsule Commonly known as:  OMNICEF Take 1 Cap by mouth two (2) times a day for 10 days. clindamycin 300 mg capsule Commonly known as:  CLEOCIN Take 1 Cap by mouth three (3) times daily for 10 days. norethindrone 0.35 mg Tab Commonly known as:  Meliton & Meliton Take 1 Tab by mouth daily. prenatal multivit-ca-min-fe-fa Tab Take  by mouth. Prescriptions Sent to Pharmacy Refills  
 cefdinir (OMNICEF) 300 mg capsule 0 Sig: Take 1 Cap by mouth two (2) times a day for 10 days. Class: Normal  
 [facility]: [facility]  #: [phone] Route: Oral  
 clindamycin (CLEOCIN) 300 mg capsule 0 Sig: Take 1 Cap by mouth three (3) times daily for 10 days. Class: Normal  
 [facility]: [facility] Ph #: [phone] Route: Oral  
  
Follow-up Instructions Return if symptoms worsen or fail to improve. Patient Instructions Mastitis: Care Instructions Your Care Instructions Mastitis is an inflammation of the breast. It occurs most often in women who are breastfeeding, but it can affect any woman. Mastitis can be caused by poor milk flow from the breast. When milk builds up in a breast, it leaks into the nearby breast tissue. Infection can also develop when the nipples become cracked or irritated. The tissue can then become infected with bacteria. Antibiotics can usually cure mastitis. For women who are nursing, continued breastfeeding (or pumping) can help. If mastitis is not treated, a pocket of pus may form in the breast and need to be drained. Follow-up care is a key part of your treatment and safety. Be sure to make and go to all appointments, and call your doctor if you are having problems. Its also a good idea to know your test results and keep a list of the medicines you take. How can you care for yourself at home? · If your doctor prescribed antibiotics, take them as directed. Do not stop taking them just because you feel better. You need to take the full course of antibiotics. · If you are breastfeeding, continue breastfeeding or pumping breast milk.  It is important to empty your breasts regularly, every 2 to 3 hours while you are awake. These tips may help: ¨ Before breastfeeding, place a warm, wet washcloth over your breast for about 15 minutes. Try this at least 3 times a day. This increases milk flow in the breast. Massaging the affected breast may also increase milk flow. ¨ Breastfeed on both sides. Try to start with your healthy breast first. Then, after your milk is flowing, breastfeed from the affected breast until it feels soft. You should empty this breast completely. Then switch back to the healthy breast and breastfeed until your baby has finished. ¨ Pump or hand-express a small amount of breast milk before breastfeeding if your breasts are too full with milk. This will make your breasts less full and may make it easier for your baby to latch on to your breast. 
¨ Pump or express milk from the affected breast if it hurts too much to breastfeed. · Take an over-the-counter pain medicine, such as acetaminophen (Tylenol) or ibuprofen (Advil, Motrin) to relieve pain and fever. Read and follow all instructions on the label. · Do not take two or more pain medicines at the same time unless the doctor told you to. Many pain medicines have acetaminophen, which is Tylenol. Too much acetaminophen (Tylenol) can be harmful. · Rest as much as possible. · Drink extra fluids. · If pus is draining from your infected breast, wash the nipple gently and let it air-dry before you put your bra back on. A disposable breast pad placed in the bra cup may absorb the pus. When should you call for help? Call your doctor now or seek immediate medical care if: · Any part of your breast becomes increasingly red, painful, swollen, or hot. · You have a new or higher fever. · You have new chills or body aches. Watch closely for changes in your health, and be sure to contact your doctor if: 
· You do not get better within 2 days. Where can you learn more? Go to http://karolyn-brandin.info/. Enter Y207 in the search box to learn more about \"Mastitis: Care Instructions. \" Current as of: May 30, 2016 Content Version: 11.3 © 0431-7893 [employer]. Care instructions adapted under license by [employer] (which disclaims liability or warranty for this information). If you have questions about a medical condition or this instruction, always ask your healthcare professional. [address] any warranty or liability for your use of this information. Sinusitis: Care Instructions Your Care Instructions Sinusitis is an infection of the lining of the sinus cavities in your head. Sinusitis often follows a cold. It causes pain and pressure in your head and face. In most cases, sinusitis gets better on its own in 1 to 2 weeks. But some mild symptoms may last for several weeks. Sometimes antibiotics are needed. Follow-up care is a key part of your treatment and safety. Be sure to make and go to all appointments, and call your doctor if you are having problems. It's also a good idea to know your test results and keep a list of the medicines you take. How can you care for yourself at home? · Take an over-the-counter pain medicine, such as acetaminophen (Tylenol), ibuprofen (Advil, Motrin), or naproxen (Aleve). Read and follow all instructions on the label. · If the doctor prescribed antibiotics, take them as directed. Do not stop taking them just because you feel better. You need to take the full course of antibiotics. · Be careful when taking over-the-counter cold or flu medicines and Tylenol at the same time. Many of these medicines have acetaminophen, which is Tylenol. Read the labels to make sure that you are not taking more than the recommended dose. Too much acetaminophen (Tylenol) can be harmful. · Breathe warm, moist air from a steamy shower, a hot bath, or a sink filled with hot water. Avoid cold, dry air.  Using a humidifier in your home may help. Follow the directions for cleaning the machine. · Use saline (saltwater) nasal washes to help keep your nasal passages open and wash out mucus and bacteria. You can buy saline nose drops at a grocery store or drugstore. Or you can make your own at home by adding 1 teaspoon of salt and 1 teaspoon of baking soda to 2 cups of distilled water. If you make your own, fill a bulb syringe with the solution, insert the tip into your nostril, and squeeze gently. Sidonie Alysia your nose. · Put a hot, wet towel or a warm gel pack on your face 3 or 4 times a day for 5 to 10 minutes each time. · Try a decongestant nasal spray like oxymetazoline (Afrin). Do not use it for more than 3 days in a row. Using it for more than 3 days can make your congestion worse. When should you call for help? Call your doctor now or seek immediate medical care if: 
· You have new or worse swelling or redness in your face or around your eyes. · You have a new or higher fever. Watch closely for changes in your health, and be sure to contact your doctor if: 
· You have new or worse facial pain. · The mucus from your nose becomes thicker (like pus) or has new blood in it. · You are not getting better as expected. Where can you learn more? Go to http://karolyn-brandin.info/. Enter Z979 in the search box to learn more about \"Sinusitis: Care Instructions. \" Current as of: July 29, 2016 Content Version: 11.3 © 1668-7050 [employer]. Care instructions adapted under license by [employer] (which disclaims liability or warranty for this information). If you have questions about a medical condition or this instruction, always ask your healthcare professional. [address] any warranty or liability for your use of this information. Introducing [facility]! Dear Silvino Grimm: Thank you for requesting a [email or web address] account.   Our records indicate that you already have an active [facility] account. You can access your account anytime at [email or web address] [email or web address] Did you know that you can access your hospital and ER discharge instructions at any time in Clifford Thames? You can also review all of your test results from your hospital stay or ER visit. Additional Information If you have questions, please visit the Frequently Asked Questions section of the Clifford Thames website at [email or web address] [email or web address] Remember, [facility] is NOT to be used for urgent needs. For medical emergencies, dial 911. Now available from your iPhone and Android! Please provide this summary of care documentation to your next provider. Your primary care clinician is listed as Hayden Lafleur. If you have any questions after today's visit, please call [phone].

## 2017-10-23 NOTE — PATIENT INSTRUCTIONS
Mastitis: Care Instructions  Your Care Instructions  Mastitis is an inflammation of the breast. It occurs most often in women who are breastfeeding, but it can affect any woman. Mastitis can be caused by poor milk flow from the breast. When milk builds up in a breast, it leaks into the nearby breast tissue. Infection can also develop when the nipples become cracked or irritated. The tissue can then become infected with bacteria. Antibiotics can usually cure mastitis. For women who are nursing, continued breastfeeding (or pumping) can help. If mastitis is not treated, a pocket of pus may form in the breast and need to be drained. Follow-up care is a key part of your treatment and safety. Be sure to make and go to all appointments, and call your doctor if you are having problems. Its also a good idea to know your test results and keep a list of the medicines you take. How can you care for yourself at home? · If your doctor prescribed antibiotics, take them as directed. Do not stop taking them just because you feel better. You need to take the full course of antibiotics. · If you are breastfeeding, continue breastfeeding or pumping breast milk. It is important to empty your breasts regularly, every 2 to 3 hours while you are awake. These tips may help:  ¨ Before breastfeeding, place a warm, wet washcloth over your breast for about 15 minutes. Try this at least 3 times a day. This increases milk flow in the breast. Massaging the affected breast may also increase milk flow. ¨ Breastfeed on both sides. Try to start with your healthy breast first. Then, after your milk is flowing, breastfeed from the affected breast until it feels soft. You should empty this breast completely. Then switch back to the healthy breast and breastfeed until your baby has finished. ¨ Pump or hand-express a small amount of breast milk before breastfeeding if your breasts are too full with milk.  This will make your breasts less full and may make it easier for your baby to latch on to your breast.  ¨ Pump or express milk from the affected breast if it hurts too much to breastfeed. · Take an over-the-counter pain medicine, such as acetaminophen (Tylenol) or ibuprofen (Advil, Motrin) to relieve pain and fever. Read and follow all instructions on the label. · Do not take two or more pain medicines at the same time unless the doctor told you to. Many pain medicines have acetaminophen, which is Tylenol. Too much acetaminophen (Tylenol) can be harmful. · Rest as much as possible. · Drink extra fluids. · If pus is draining from your infected breast, wash the nipple gently and let it air-dry before you put your bra back on. A disposable breast pad placed in the bra cup may absorb the pus. When should you call for help? Call your doctor now or seek immediate medical care if:  · Any part of your breast becomes increasingly red, painful, swollen, or hot. · You have a new or higher fever. · You have new chills or body aches. Watch closely for changes in your health, and be sure to contact your doctor if:  · You do not get better within 2 days. Where can you learn more? Go to http://karolyn-brandin.info/. Enter Y207 in the search box to learn more about \"Mastitis: Care Instructions. \"  Current as of: May 30, 2016  Content Version: 11.3  © 5619-7506 [employer]. Care instructions adapted under license by [employer] (which disclaims liability or warranty for this information). If you have questions about a medical condition or this instruction, always ask your healthcare professional. [place] [address] any warranty or liability for your use of this information. Sinusitis: Care Instructions  Your Care Instructions    Sinusitis is an infection of the lining of the sinus cavities in your head. Sinusitis often follows a cold. It causes pain and pressure in your head and face.   In most cases, sinusitis gets better on its own in 1 to 2 weeks. But some mild symptoms may last for several weeks. Sometimes antibiotics are needed. Follow-up care is a key part of your treatment and safety. Be sure to make and go to all appointments, and call your doctor if you are having problems. It's also a good idea to know your test results and keep a list of the medicines you take. How can you care for yourself at home? · Take an over-the-counter pain medicine, such as acetaminophen (Tylenol), ibuprofen (Advil, Motrin), or naproxen (Aleve). Read and follow all instructions on the label. · If the doctor prescribed antibiotics, take them as directed. Do not stop taking them just because you feel better. You need to take the full course of antibiotics. · Be careful when taking over-the-counter cold or flu medicines and Tylenol at the same time. Many of these medicines have acetaminophen, which is Tylenol. Read the labels to make sure that you are not taking more than the recommended dose. Too much acetaminophen (Tylenol) can be harmful. · Breathe warm, moist air from a steamy shower, a hot bath, or a sink filled with hot water. Avoid cold, dry air. Using a humidifier in your home may help. Follow the directions for cleaning the machine. · Use saline (saltwater) nasal washes to help keep your nasal passages open and wash out mucus and bacteria. You can buy saline nose drops at a grocery store or drugstore. Or you can make your own at home by adding 1 teaspoon of salt and 1 teaspoon of baking soda to 2 cups of distilled water. If you make your own, fill a bulb syringe with the solution, insert the tip into your nostril, and squeeze gently. Duana Glow your nose. · Put a hot, wet towel or a warm gel pack on your face 3 or 4 times a day for 5 to 10 minutes each time. · Try a decongestant nasal spray like oxymetazoline (Afrin). Do not use it for more than 3 days in a row.  Using it for more than 3 days can make your congestion worse. When should you call for help? Call your doctor now or seek immediate medical care if:  · You have new or worse swelling or redness in your face or around your eyes. · You have a new or higher fever. Watch closely for changes in your health, and be sure to contact your doctor if:  · You have new or worse facial pain. · The mucus from your nose becomes thicker (like pus) or has new blood in it. · You are not getting better as expected. Where can you learn more? Go to http://karolyn-brandin.info/. Enter W871 in the search box to learn more about \"Sinusitis: Care Instructions. \"  Current as of: July 29, 2016  Content Version: 11.3  © 7273-6983 [employer]. Care instructions adapted under license by [employer] (which disclaims liability or warranty for this information). If you have questions about a medical condition or this instruction, always ask your healthcare professional. [place] [address] any warranty or liability for your use of this information.

## 2017-11-09 ENCOUNTER — OFFICE VISIT (OUTPATIENT)
Dept: OBGYN CLINIC | Age: 27
End: 2017-11-09

## 2017-11-09 VITALS
SYSTOLIC BLOOD PRESSURE: 102 MMHG | BODY MASS INDEX: 24.97 KG/M2 | DIASTOLIC BLOOD PRESSURE: 62 MMHG | WEIGHT: 127.2 LBS | HEIGHT: 60 IN

## 2017-11-09 DIAGNOSIS — Z01.419 ENCOUNTER FOR GYNECOLOGICAL EXAMINATION (GENERAL) (ROUTINE) WITHOUT ABNORMAL FINDINGS: Primary | ICD-10-CM

## 2017-11-09 RX ORDER — ACETAMINOPHEN AND CODEINE PHOSPHATE 120; 12 MG/5ML; MG/5ML
1 SOLUTION ORAL DAILY
Qty: 3 PACKAGE | Refills: 4 | Status: SHIPPED | OUTPATIENT
Start: 2017-11-09 | End: 2018-11-20 | Stop reason: ALTCHOICE

## 2017-11-09 NOTE — PATIENT INSTRUCTIONS
Breast Self-Exam: Care Instructions  Your Care Instructions    A breast self-exam is when you check your breasts for lumps or changes. This regular exam helps you learn how your breasts normally look and feel. Most breast problems or changes are not because of cancer. Breast self-exam is not a substitute for a mammogram. Having regular breast exams by your doctor and regular mammograms improve your chances of finding any problems with your breasts. Some women set a time each month to do a step-by-step breast self-exam. Other women like a less formal system. They might look at their breasts as they brush their teeth, or feel their breasts once in a while in the shower. If you notice a change in your breast, tell your doctor. Follow-up care is a key part of your treatment and safety. Be sure to make and go to all appointments, and call your doctor if you are having problems. It's also a good idea to know your test results and keep a list of the medicines you take. How do you do a breast self-exam?  · The best time to examine your breasts is usually one week after your menstrual period begins. Your breasts should not be tender then. If you do not have periods, you might do your exam on a day of the month that is easy to remember. · To examine your breasts:  ¨ Remove all your clothes above the waist and lie down. When you are lying down, your breast tissue spreads evenly over your chest wall, which makes it easier to feel all your breast tissue. ¨ Use the pads-not the fingertips-of the 3 middle fingers of your left hand to check your right breast. Move your fingers slowly in small coin-sized circles that overlap. ¨ Use three levels of pressure to feel of all your breast tissue. Use light pressure to feel the tissue close to the skin surface. Use medium pressure to feel a little deeper. Use firm pressure to feel your tissue close to your breastbone and ribs.  Use each pressure level to feel your breast tissue before moving on to the next spot. ¨ Check your entire breast, moving up and down as if following a strip from the collarbone to the bra line, and from the armpit to the ribs. Repeat until you have covered the entire breast.  ¨ Repeat this procedure for your left breast, using the pads of the 3 middle fingers of your right hand. · To examine your breasts while in the shower:  ¨ Place one arm over your head and lightly soap your breast on that side. ¨ Using the pads of your fingers, gently move your hand over your breast (in the strip pattern described above), feeling carefully for any lumps or changes. ¨ Repeat for the other breast.  · Have your doctor inspect anything you notice to see if you need further testing. Where can you learn more? Go to http://karolyn-brandin.info/. Enter P148 in the search box to learn more about \"Breast Self-Exam: Care Instructions. \"  Current as of: May 12, 2017  Content Version: 11.4  © 1595-2039 [employer]. Care instructions adapted under license by [employer] (which disclaims liability or warranty for this information). If you have questions about a medical condition or this instruction, always ask your healthcare professional. [place] [address] any warranty or liability for your use of this information.

## 2017-11-09 NOTE — PROGRESS NOTES
Louann Barber is a ,  32 y.o. female [facility] [place] whose No LMP recorded. was on  who presents for her annual checkup. Patient is still breast feeding/pumping, she has had several clogged milk ducts. She has been on abx for mastitis and a sinus infection and thinks she may have had a yeast infection. Symptoms have improved. With regard to the Gardisil vaccine, she is older than the FDA approved age to receive it. Menstrual status:    Her periods are absent in flow due to breast feeding. She denies dysmenorrhea. She reports no premenstrual symptoms. Contraception:    The current method of family planning is oral progesterone-only contraceptive and She declines contraception and counseling. Sexual history:    She  reports that she currently engages in sexual activity and has had male partners. She reports using the following method of birth control/protection: Pill. Medical conditions:    Since her last annual GYN exam about 2016 ago, she has not the following changes in her health history: none. Pap and Mammogram History:    Her most recent Pap smear was normal obtained 2016 year(s) ago. The patient has never had a mammogram.    The patient does not have a family history of breast cancer. Past Medical History:   Diagnosis Date    Anxiety     Asthma     exercise induced    Calculus of kidney     Celiac disease     Depression     Epilepsy ([facility] [address]     seizures when 16-15 yo, on medication for 2 years, undetermined cause, no problems since that time    Fibromyalgia     GERD (gastroesophageal reflux disease)     H/O seasonal allergies      Past Surgical History:   Procedure Laterality Date    HX TONSILLECTOMY         Current Outpatient Prescriptions   Medication Sig Dispense Refill    norethindrone (MICRONOR) 0.35 mg tab Take 1 Tab by mouth daily. 3 Package 0    prenatal multivit-ca-min-fe-fa tab Take  by mouth.        Allergies: Amoxicillin and Gluten Social History     Social History    Marital status:      Spouse name: N/A    Number of children: N/A    Years of education: N/A     Occupational History    Not on file. Social History Main Topics    Smoking status: Never Smoker    Smokeless tobacco: Never Used    Alcohol use 0.6 oz/week     1 Standard drinks or equivalent per week      Comment: social    Drug use: No    Sexual activity: Yes     Partners: Male     Birth control/ protection: Pill     Other Topics Concern    Not on file     Social History Narrative     Tobacco History:  reports that she has never smoked. She has never used smokeless tobacco.  Alcohol Abuse:  reports that she drinks about 0.6 oz of alcohol per week   Drug Abuse:  reports that she does not use illicit drugs.     Patient Active Problem List   Diagnosis Code    H/O seasonal allergies Z88.9    Fibromyalgia M79.7    Celiac disease K90.0    Supervision of normal first pregnancy Z34.00    Pregnancy Z34.90    Mastitis N61.0       Review of Systems - History obtained from the patient  Constitutional: negative for weight loss, fever, night sweats  HEENT: negative for hearing loss, earache, congestion, snoring, sorethroat  CV: negative for chest pain, palpitations, edema  Resp: negative for cough, shortness of breath, wheezing  GI: negative for change in bowel habits, abdominal pain, black or bloody stools  : negative for frequency, dysuria, hematuria, vaginal discharge  MSK: negative for back pain, joint pain, muscle pain  Breast: negative for breast lumps, nipple discharge, galactorrhea  Skin :negative for itching, rash, hives  Neuro: negative for dizziness, headache, confusion, weakness  Psych: negative for anxiety, depression, change in mood  Heme/lymph: negative for bleeding, bruising, pallor    Physical Exam    Visit Vitals    /62    Ht 5' (1.524 m)    Wt 127 lb 3.2 oz (57.7 kg)    Breastfeeding Yes    BMI 24.84 kg/m2 Constitutional  · Appearance: well-nourished, well developed, alert, in no acute distress    HENT  · Head and Face: appears normal    Neck  · Inspection/Palpation: normal appearance, no masses or tenderness  · Lymph Nodes: no lymphadenopathy present  · Thyroid: gland size normal, nontender, no nodules or masses present on palpation    Chest  · Respiratory Effort: breathing normal  · Auscultation: normal breath sounds    Cardiovascular  · Heart:  · Auscultation: regular rate and rhythm without murmur    Breasts  · Inspection of Breasts: breasts symmetrical, no skin changes, no discharge present, nipple appearance normal, no skin retraction present  · Palpation of Breasts and Axillae: no masses present on palpation, no breast tenderness, left has clogged duct  · Axillary Lymph Nodes: no lymphadenopathy present    Gastrointestinal  · Abdominal Examination: abdomen non-tender to palpation, normal bowel sounds, no masses present  · Liver and spleen: no hepatomegaly present, spleen not palpable  · Hernias: no hernias identified    Genitourinary  · External Genitalia: normal appearance for age, no discharge present, no tenderness present, no inflammatory lesions present, no masses present, no atrophy present  · Vagina: normal vaginal vault without central or paravaginal defects, no discharge present, no inflammatory lesions present, no masses present  · Bladder: non-tender to palpation  · Urethra: appears normal  · Cervix: normal   · Uterus: normal size, shape and consistency  · Adnexa: no adnexal tenderness present, no adnexal masses present  · Perineum: perineum within normal limits, no evidence of trauma, no rashes or skin lesions present  · Anus: anus within normal limits, no hemorrhoids present  · Inguinal Lymph Nodes: no lymphadenopathy present    Skin  · General Inspection: no rash, no lesions identified    Neurologic/Psychiatric  · Mental Status:  · Orientation: grossly oriented to person, place and time  · Mood and Affect: mood normal, affect appropriate    . Assessment:  Routine gynecologic examination  Her current medical status is satisfactory with no evidence of significant gynecologic issues.     Plan:  Counseled re: diet, exercise, healthy lifestyle  Return for yearly wellness visits  Cont Micronor  If develops mastitis see me rather than PCP for appropriate abx

## 2018-04-25 ENCOUNTER — OFFICE VISIT (OUTPATIENT)
Dept: FAMILY MEDICINE CLINIC | Age: 28
End: 2018-04-25

## 2018-04-25 VITALS
HEART RATE: 62 BPM | TEMPERATURE: 98.2 F | WEIGHT: 116 LBS | HEIGHT: 60 IN | DIASTOLIC BLOOD PRESSURE: 60 MMHG | RESPIRATION RATE: 20 BRPM | OXYGEN SATURATION: 99 % | SYSTOLIC BLOOD PRESSURE: 80 MMHG | BODY MASS INDEX: 22.78 KG/M2

## 2018-04-25 DIAGNOSIS — M79.7 FIBROMYALGIA: ICD-10-CM

## 2018-04-25 DIAGNOSIS — E55.9 VITAMIN D DEFICIENCY: ICD-10-CM

## 2018-04-25 DIAGNOSIS — Z00.00 WELL WOMAN EXAM (NO GYNECOLOGICAL EXAM): ICD-10-CM

## 2018-04-25 DIAGNOSIS — R53.83 FATIGUE, UNSPECIFIED TYPE: ICD-10-CM

## 2018-04-25 DIAGNOSIS — Z00.00 ROUTINE ADULT HEALTH MAINTENANCE: Primary | ICD-10-CM

## 2018-04-25 PROBLEM — N61.0 MASTITIS: Status: RESOLVED | Noted: 2017-10-11 | Resolved: 2018-04-25

## 2018-04-25 PROBLEM — Z34.90 PREGNANCY: Status: RESOLVED | Noted: 2017-06-23 | Resolved: 2018-04-25

## 2018-04-25 RX ORDER — CETIRIZINE HCL 10 MG
10 TABLET ORAL DAILY
COMMUNITY

## 2018-04-25 RX ORDER — AMITRIPTYLINE HYDROCHLORIDE 10 MG/1
10 TABLET, FILM COATED ORAL
Qty: 90 TAB | Refills: 1 | Status: SHIPPED | OUTPATIENT
Start: 2018-04-25 | End: 2018-08-09

## 2018-04-25 NOTE — PATIENT INSTRUCTIONS
Fibromyalgia: Care Instructions  Your Care Instructions    Fibromyalgia is a painful condition that is not completely understood by medical experts. The cause of fibromyalgia is not known. It can make you feel tired and ache all over. It causes tender spots at specific points of the body that hurt only when you press on them. You may have trouble sleeping, as well as other symptoms. These problems can upset your work and home life. Symptoms tend to come and go, although they may never go away completely. Fibromyalgia does not harm your muscles, joints, or organs. Follow-up care is a key part of your treatment and safety. Be sure to make and go to all appointments, and call your doctor if you are having problems. It's also a good idea to know your test results and keep a list of the medicines you take. How can you care for yourself at home? · Exercise often. Walk, swim, or bike to help with pain and sleep problems and to make you feel better. · Try to get a good night's sleep. Go to bed and get up at the same time each day, whether you feel rested or not. Make sure you have a good mattress and pillow. · Reduce stress. Avoid things that cause you stress, if you can. If not, work at making them less stressful. Learn to use biofeedback, guided imagery, meditation, or other methods to relax. · Make healthy changes. Eat a balanced diet, quit smoking, and limit alcohol and caffeine. · Use a heating pad set on low or take warm baths or showers for pain. Using cold packs for up to 20 minutes at a time can also relieve pain. Put a thin cloth between the cold pack and your skin. A gentle massage might help too. · Be safe with medicines. Take your medicines exactly as prescribed. Call your doctor if you think you are having a problem with your medicine. Your doctor may talk to you about taking antidepressant medicines. These medicines may improve sleep, relieve pain, and in some cases treat depression.   · Learn about fibromyalgia. This makes coping easier. Then, take an active role in your treatment. · Think about joining a support group with others who have fibromyalgia to learn more and get support. When should you call for help? Watch closely for changes in your health, and be sure to contact your doctor if:  ? · You feel sad, helpless, or hopeless; lose interest in things you used to enjoy; or have other symptoms of depression. ? · Your fibromyalgia symptoms get worse. Where can you learn more? Go to http://karolyn-brandin.info/. Enter V003 in the search box to learn more about \"Fibromyalgia: Care Instructions. \"  Current as of: October 14, 2016  Content Version: 11.4  © 8692-0005 Healthwise, [employer]. Care instructions adapted under license by [employer] (which disclaims liability or warranty for this information). If you have questions about a medical condition or this instruction, always ask your healthcare professional. [address] any warranty or liability for your use of this information.

## 2018-04-25 NOTE — LETTER
4/27/2018 8:27 AM 
 
Ms. Anita Spann [facility] [address] [place] [address] Dear Anita Solis: 
 
Please find your most recent results below. Resulted Orders LIPID PANEL Result Value Ref Range Cholesterol, total 184 <200 mg/dL HDL Cholesterol 75 >50 mg/dL Triglyceride 35 <150 mg/dL LDL-CHOLESTEROL 99 mg/dL (calc) Comment:  
   Reference range: <100 Desirable range <100 mg/dL for primary prevention;   
<70 mg/dL for patients with CHD or diabetic patients  
with > or = 2 CHD risk factors. LDL-C is now calculated using the Vickey-Encinas  
calculation, which is a validated novel method providing  
better accuracy than the Friedewald equation in the  
estimation of LDL-C. Zabrina Penny et al. [facility] Island[facility]. 8652;109(29): 0737-6396  
(http://[employer]. Total Attorneys/faq/MHI742) Cholesterol/HDL ratio 2.5 <5.0 (calc) Non-HDL Cholesterol 109 <130 mg/dL (calc) Comment: For patients with diabetes plus 1 major ASCVD risk  
factor, treating to a non-HDL-C goal of <100 mg/dL (LDL-C of <70 mg/dL) is considered a therapeutic  
option. Narrative Received Date:   [id number] FASTING:YES  
METABOLIC PANEL, COMPREHENSIVE Result Value Ref Range Glucose 80 65 - 99 mg/dL Comment:  
     
           Fasting reference interval 
  
  
 BUN 16 7 - 25 mg/dL Creatinine 0.83 0.50 - 1.10 mg/dL GFR est non-AA 95 > OR = 60 mL/min/1.73m2 GFR est  > OR = 60 mL/min/1.73m2 BUN/Creatinine ratio NOT APPLICABLE 6 - 22 (calc) Sodium 137 135 - 146 mmol/L Potassium 4.1 3.5 - 5.3 mmol/L Chloride 104 98 - 110 mmol/L  
 CO2 26 20 - 31 mmol/L Calcium 9.6 8.6 - 10.2 mg/dL Protein, total 6.8 6.1 - 8.1 g/dL Albumin 4.7 3.6 - 5.1 g/dL Globulin 2.1 1.9 - 3.7 g/dL (calc) ALB/GLOBRATIO 2.2 1.0 - 2.5 (calc) Bilirubin, total 0.6 0.2 - 1.2 mg/dL Alk.  phosphatase 88 33 - 115 U/L  
 AST (SGOT) 14 10 - 30 U/L  
 ALT (SGPT) 9 6 - 29 U/L  
 Narrative Received Date:   [id number] FASTING:YES  
HEMOGLOBIN A1C WITH EAG Result Value Ref Range Hemoglobin A1c 4.7 <5.7 % of total Hgb Comment: For the purpose of screening for the presence of 
diabetes: 
  
<5.7%       Consistent with the absence of diabetes 5.7-6.4%    Consistent with increased risk for diabetes 
            (prediabetes) > or =6.5%  Consistent with diabetes This assay result is consistent with a decreased risk 
of diabetes. Currently, no consensus exists regarding use of 
hemoglobin A1c for diagnosis of diabetes in children. According to American Diabetes Association (ADA) guidelines, hemoglobin A1c <7.0% represents optimal 
control in non-pregnant diabetic patients. Different 
metrics may apply to specific patient populations. Standards of Medical Care in Diabetes(ADA). eAG (mg/dL) 88 (calc) eAG (mmol/L) 4.9 (calc) Narrative Received Date:   [id number] FASTING:YES  
VITAMIN D, 25 HYDROXY Result Value Ref Range VITAMIN D, 25-HYDROXY 41 30 - 100 ng/mL Comment:  
   Vitamin D Status         25-OH Vitamin D: 
  
Deficiency:                    <20 ng/mL Insufficiency:             20 - 29 ng/mL Optimal:                 > or = 30 ng/mL For 25-OH Vitamin D testing on patients on  
D2-supplementation and patients for whom quantitation  
of D2 and D3 fractions is required, the QuestAssureD(TM) 
25-OH VIT D, (D2,D3), LC/MS/MS is recommended: order  
code 26962 (patients >2yrs). For more information on this test, go to: 
http://education. [employer]. MxBiodevices/faq/BQK813 (This link is being provided for  
informational/educational purposes only.) Narrative Received Date:   [id number] FASTING:YES  
TSH 3RD GENERATION Result Value Ref Range TSH 0.94 mIU/L Comment:  
             Reference Range 
            
          > or = 20 Years  0.40-4.50 Pregnancy Ranges First trimester    0.26-2.66 Second trimester   0.55-2.73 Third trimester    0.43-2.91 Narrative Received Date:   [id number] FASTING:YES  
CBC WITH AUTOMATED DIFF Result Value Ref Range WBC 5.1 3.8 - 10.8 Thousand/uL RBC 4.20 3.80 - 5.10 Million/uL HGB 13.3 11.7 - 15.5 g/dL HCT 39.0 35.0 - 45.0 % MCV 92.9 80.0 - 100.0 fL  
 MCH 31.7 27.0 - 33.0 pg  
 MCHC 34.1 32.0 - 36.0 g/dL  
 RDW 12.1 11.0 - 15.0 % PLATELET 181 539 - 284 Thousand/uL MEAN PLATELET VOLUME 9.3 7.5 - 12.5 fL  
 ABS. NEUTROPHILS 2861 1500 - 7800 cells/uL ABSOLUTE BANDS CANCELED Comment:  
   Result canceled by the ancillary ABSOLUTE METAMYELOCYTES CANCELED Comment:  
   Result canceled by the ancillary ABSOLUTE MYELOCYTES CANCELED Comment:  
   Result canceled by the ancillary ABSOLUTE PROMYELOCYTES CANCELED Comment:  
   Result canceled by the ancillary ABS. LYMPHOCYTES 1770 850 - 3900 cells/uL  
 ABS. MONOCYTES 388 200 - 950 cells/uL  
 ABS. EOSINOPHILS 51 15 - 500 cells/uL  
 ABS. BASOPHILS 31 0 - 200 cells/uL ABSOLUTE BLASTS CANCELED Comment:  
   Result canceled by the ancillary ABSOLUTE NRBC CANCELED Comment:  
   Result canceled by the ancillary Neutrophils 56.1 % BAND NEUTROPHILS CANCELED Comment:  
   Result canceled by the ancillary METAMYELOCYTES CANCELED Comment:  
   Result canceled by the ancillary MYELOCYTES CANCELED Comment:  
   Result canceled by the ancillary PROMYELOCYTES CANCELED Comment:  
   Result canceled by the ancillary LYMPHOCYTES 34.7 % REACTIVE LYMPHS CANCELED Comment:  
   Result canceled by the ancillary MONOCYTES 7.6 % EOSINOPHILS 1.0 % BASOPHILS 0.6 % BLASTS CANCELED Comment:  
   Result canceled by the ancillary NRBC CANCELED Comment:  
   Result canceled by the ancillary COMMENT(S) CANCELED Comment: Result canceled by the ancillary Narrative Received Date:   [id number] FASTING:YES  
VITAMIN B12 Result Value Ref Range Vitamin B12 619 200 - 1100 pg/mL Narrative Received Date:   [id number] FASTING:YES  
 
 
RECOMMENDATIONS: 
Lab results look great!  Normal blood counts. Normal thyroid, sugar level, kidney, and liver tests. Vit D level is at goal. Normal Vit B 12 level. Please call me if you have any questions: [phone] Sincerely, Dang Thrasher MD

## 2018-04-25 NOTE — PROGRESS NOTES
Identified pt with two pt identifiers(name and ). Chief Complaint   Patient presents with    Physical     she has a 9 month old boy - needs labs to [address] [facility]     started up again         There are no preventive care reminders to display for this patient. Wt Readings from Last 3 Encounters:   18 116 lb (52.6 kg)   17 127 lb 3.2 oz (57.7 kg)   10/23/17 125 lb (56.7 kg)     Temp Readings from Last 3 Encounters:   18 98.2 °F (36.8 °C) (Oral)   10/23/17 99 °F (37.2 °C) (Oral)   10/11/17 99.1 °F (37.3 °C) (Oral)     BP Readings from Last 3 Encounters:   18 (!) 80/60   17 102/62   10/23/17 105/88     Pulse Readings from Last 3 Encounters:   18 62   10/23/17 (!) 101   10/11/17 68         Learning Assessment:  :     Learning Assessment 10/16/2015   PRIMARY LEARNER Patient   BARRIERS PRIMARY LEARNER NONE   PRIMARY LANGUAGE ENGLISH   LEARNER PREFERENCE PRIMARY DEMONSTRATION   ANSWERED BY patient   RELATIONSHIP SELF       Depression Screening:  :     PHQ over the last two weeks 2018   Little interest or pleasure in doing things Not at all   Feeling down, depressed or hopeless Not at all   Total Score PHQ 2 0       Fall Risk Assessment:  :     No flowsheet data found. Abuse Screening:  :     Abuse Screening Questionnaire 2018   Do you ever feel afraid of your partner? N   Are you in a relationship with someone who physically or mentally threatens you? N   Is it safe for you to go home? Y       Coordination of Care Questionnaire:  :     1) Have you been to an emergency room, urgent care clinic since your last visit? no   Hospitalized since your last visit? no             2) Have you seen or consulted any other health care providers outside of [address] [place] [address] since your last visit? no  (Include any pap smears or colon screenings in this section.)    3) Do you have an Advance Directive on file?  no  Are you interested in receiving information about Advance Directives? no    Reviewed record in preparation for visit and have obtained necessary documentation. Medication reconciliation up to date and corrected with patient at this time.

## 2018-04-25 NOTE — MR AVS SNAPSHOT
[address] [address] [address] [facility] 
[phone] Patient: Billy Kenny MRN: [id number] EWU:[birth year] Visit Information Date & Time Provider Department Dept. Phone Encounter #  
 [id number]  5:31 AM Randall Reyna [place] [address] [phone] [id number] Upcoming Health Maintenance Date Due Influenza Age 5 to Adult 10/25/2018* PAP AKA CERVICAL CYTOLOGY 8/19/2019 DTaP/Tdap/Td series (2 - Td) 3/30/2027 *Topic was postponed. The date shown is not the original due date. Allergies as of 4/25/2018  Review Complete On: 8/61/9667 By: Randall Reyna MD  
  
 Severity Noted Reaction Type Reactions Amoxicillin  08/13/2014    Hives Gluten  08/13/2014    Unable to Obtain Current Immunizations  Reviewed on 6/25/2017 Name Date Tdap 3/30/2017 Not reviewed this visit You Were Diagnosed With   
  
 Codes Comments Routine adult health maintenance    -  Primary ICD-10-CM: Z00.00 ICD-9-CM: V70.0 Vitamin D deficiency     ICD-10-CM: E55.9 ICD-9-CM: 268.9 Fibromyalgia     ICD-10-CM: M79.7 ICD-9-CM: 729.1 Well woman exam (no gynecological exam)     ICD-10-CM: Z00.00 ICD-9-CM: V70.0 [V70.0] Fatigue, unspecified type     ICD-10-CM: R53.83 ICD-9-CM: 780.79 Vitals BP Pulse Temp Resp Height(growth percentile) Weight(growth percentile) (!) 80/60 (BP 1 Location: Left arm, BP Patient Position: Sitting) 62 98.2 °F (36.8 °C) (Oral) 20 5' (1.524 m) 116 lb (52.6 kg) SpO2 Breastfeeding? BMI OB Status Smoking Status 99% Yes 22.65 kg/m2 Breastfeeding Never Smoker BMI and BSA Data Body Mass Index Body Surface Area  
 22.65 kg/m 2 1.49 m 2 Preferred Pharmacy Pharmacy Name Phone [id number] [address] [phone] Your Updated Medication List  
  
   
 This list is accurate as of 4/25/18  9:23 AM.  Always use your most recent med list.  
  
  
  
  
 amitriptyline 10 mg tablet Commonly known as:  ELAVIL Take 1 Tab by mouth nightly. Indications: FIBROMYALGIA  
  
 norethindrone 0.35 mg Tab Commonly known as:  Meliton & Meliton Take 1 Tab by mouth daily. prenatal multivit-ca-min-fe-fa Tab Take  by mouth. ZyrTEC 10 mg tablet Generic drug:  cetirizine Take 10 mg by mouth daily. Prescriptions Sent to Pharmacy Refills  
 amitriptyline (ELAVIL) 10 mg tablet 1 Sig: Take 1 Tab by mouth nightly. Indications: FIBROMYALGIA Class: Normal  
 Pharmacy: [facility]/pharmacy [address], [address] [facility] Ph #: [phone] Route: Oral  
  
We Performed the Following CBC WITH AUTOMATED DIFF [74837 CPT(R)] HEMOGLOBIN A1C WITH EAG [64689 CPT(R)] LIPID PANEL [29957 CPT(R)] METABOLIC PANEL, COMPREHENSIVE [07713 CPT(R)] TSH 3RD GENERATION [57027 CPT(R)] VITAMIN B12 [id number] CPT(R)] VITAMIN D, 25 HYDROXY [id number] CPT(R)] Patient Instructions Fibromyalgia: Care Instructions Your Care Instructions Fibromyalgia is a painful condition that is not completely understood by medical experts. The cause of fibromyalgia is not known. It can make you feel tired and ache all over. It causes tender spots at specific points of the body that hurt only when you press on them. You may have trouble sleeping, as well as other symptoms. These problems can upset your work and home life. Symptoms tend to come and go, although they may never go away completely. Fibromyalgia does not harm your muscles, joints, or organs. Follow-up care is a key part of your treatment and safety. Be sure to make and go to all appointments, and call your doctor if you are having problems. It's also a good idea to know your test results and keep a list of the medicines you take. How can you care for yourself at home? · Exercise often. Walk, swim, or bike to help with pain and sleep problems and to make you feel better. · Try to get a good night's sleep. Go to bed and get up at the same time each day, whether you feel rested or not. Make sure you have a good mattress and pillow. · Reduce stress. Avoid things that cause you stress, if you can. If not, work at making them less stressful. Learn to use biofeedback, guided imagery, meditation, or other methods to relax. · Make healthy changes. Eat a balanced diet, quit smoking, and limit alcohol and caffeine. · Use a heating pad set on low or take warm baths or showers for pain. Using cold packs for up to 20 minutes at a time can also relieve pain. Put a thin cloth between the cold pack and your skin. A gentle massage might help too. · Be safe with medicines. Take your medicines exactly as prescribed. Call your doctor if you think you are having a problem with your medicine. Your doctor may talk to you about taking antidepressant medicines. These medicines may improve sleep, relieve pain, and in some cases treat depression. · Learn about fibromyalgia. This makes coping easier. Then, take an active role in your treatment. · Think about joining a support group with others who have fibromyalgia to learn more and get support. When should you call for help? Watch closely for changes in your health, and be sure to contact your doctor if: 
? · You feel sad, helpless, or hopeless; lose interest in things you used to enjoy; or have other symptoms of depression. ? · Your fibromyalgia symptoms get worse. Where can you learn more? Go to http://karolyn-brandin.info/. Enter V003 in the search box to learn more about \"Fibromyalgia: Care Instructions. \" Current as of: October 14, 2016 Content Version: 11.4 © 4336-7407 [employer].  Care instructions adapted under license by [employer] (which disclaims liability or warranty for this information). If you have questions about a medical condition or this instruction, always ask your healthcare professional. [address] any warranty or liability for your use of this information. Introducing [facility]! Dear Christie Biggs: Thank you for requesting a Fieldwire account. Our records indicate that you already have an active Fieldwire account. You can access your account anytime at [email or web address] Did you know that you can access your hospital and ER discharge instructions at any time in Fieldwire? You can also review all of your test results from your hospital stay or ER visit. Additional Information If you have questions, please visit the Frequently Asked Questions section of the Fieldwire website at [email or web address] Remember, Fieldwire is NOT to be used for urgent needs. For medical emergencies, dial 911. Now available from your iPhone and Android! Please provide this summary of care documentation to your next provider. Your primary care clinician is listed as Carrillo Slade. If you have any questions after today's visit, please call [phone].

## 2018-04-25 NOTE — PROGRESS NOTES
Subjective:   25 y.o. female for Well Woman Check. Her gyne and breast care is done elsewhere by her Ob-Gyne physician. Patient Active Problem List    Diagnosis Date Noted    H/O seasonal allergies     Fibromyalgia     Celiac disease      Current Outpatient Prescriptions   Medication Sig Dispense Refill    cetirizine (ZYRTEC) 10 mg tablet Take 10 mg by mouth daily.  amitriptyline (ELAVIL) 10 mg tablet Take 1 Tab by mouth nightly. Indications: FIBROMYALGIA 90 Tab 1    norethindrone (MICRONOR) 0.35 mg tab Take 1 Tab by mouth daily. 3 Package 4    prenatal multivit-ca-min-fe-fa tab Take  by mouth. Allergies   Allergen Reactions    Amoxicillin Hives    Gluten Unable to Obtain     Past Medical History:   Diagnosis Date    Anxiety     Asthma     exercise induced    Calculus of kidney     Celiac disease     Depression     Epilepsy ([facility] [address]     seizures when 16-13 yo, on medication for 2 years, undetermined cause, no problems since that time    Fibromyalgia     GERD (gastroesophageal reflux disease)     H/O seasonal allergies      Past Surgical History:   Procedure Laterality Date    HX GYN      HX TONSILLECTOMY       Family History   Problem Relation Age of Onset    Elevated Lipids Father     Hypertension Father      Social History   Substance Use Topics    Smoking status: Never Smoker    Smokeless tobacco: Never Used    Alcohol use 0.6 oz/week     1 Standard drinks or equivalent per week      Comment: social             ROS: Feeling generally well. No TIA's or unusual headaches, no dysphagia. No prolonged cough. No dyspnea or chest pain on exertion. No abdominal pain, change in bowel habits, black or bloody stools. No urinary tract symptoms. No new or unusual musculoskeletal symptoms. Specific concerns today: recurrence of fibromyalgia symptoms. She has increased myalgia, poor sleep, high work stress, has a 8 mon old baby.  She used to take Amitriptyline with good relief. Objective: The patient appears well, alert, oriented x 3, in no distress. Visit Vitals    BP (!) 80/60 (BP 1 Location: Left arm, BP Patient Position: Sitting)  Comment: manual    Pulse 62    Temp 98.2 °F (36.8 °C) (Oral)    Resp 20    Ht 5' (1.524 m)    Wt 116 lb (52.6 kg)    SpO2 99%    Breastfeeding Yes    BMI 22.65 kg/m2     ENT normal.  Neck supple. No adenopathy or thyromegaly. FORD. Lungs are clear, good air entry, no wheezes, rhonchi or rales. S1 and S2 normal, no murmurs, regular rate and rhythm. Abdomen soft without tenderness, guarding, mass or organomegaly. Extremities show no edema, normal peripheral pulses. Neurological is normal, no focal findings. Breast and Pelvic exams are deferred. Assessment/Plan:   Well Woman  increase physical activity, follow low fat diet, follow low salt diet, routine labs ordered    ICD-10-CM ICD-9-CM    1. Routine adult health maintenance Z00.00 V70.0 CBC WITH AUTOMATED DIFF      METABOLIC PANEL, COMPREHENSIVE      LIPID PANEL      TSH 3RD GENERATION      HEMOGLOBIN A1C WITH EAG   2. Vitamin D deficiency E55.9 268.9 VITAMIN D, 25 HYDROXY   3. Fibromyalgia M79.7 729.1 amitriptyline (ELAVIL) 10 mg tablet   4. Well woman exam (no gynecological exam) Z00.00 V70.0     [V70.0]   5. Fatigue, unspecified type R53.83 780.79 VITAMIN B12     Will start back Amitriptyline 10 mg QHS. I reviewed Up to Date for safety during nursing. Labs drawn. Recommend low impact exercise and stretching, yoga. Pt verbalizes understanding of plan of care and denies further questions or concerns at this time. Patient Instructions          Fibromyalgia: Care Instructions  Your Care Instructions    Fibromyalgia is a painful condition that is not completely understood by medical experts. The cause of fibromyalgia is not known. It can make you feel tired and ache all over. It causes tender spots at specific points of the body that hurt only when you press on them.  You may have trouble sleeping, as well as other symptoms. These problems can upset your work and home life. Symptoms tend to come and go, although they may never go away completely. Fibromyalgia does not harm your muscles, joints, or organs. Follow-up care is a key part of your treatment and safety. Be sure to make and go to all appointments, and call your doctor if you are having problems. It's also a good idea to know your test results and keep a list of the medicines you take. How can you care for yourself at home? · Exercise often. Walk, swim, or bike to help with pain and sleep problems and to make you feel better. · Try to get a good night's sleep. Go to bed and get up at the same time each day, whether you feel rested or not. Make sure you have a good mattress and pillow. · Reduce stress. Avoid things that cause you stress, if you can. If not, work at making them less stressful. Learn to use biofeedback, guided imagery, meditation, or other methods to relax. · Make healthy changes. Eat a balanced diet, quit smoking, and limit alcohol and caffeine. · Use a heating pad set on low or take warm baths or showers for pain. Using cold packs for up to 20 minutes at a time can also relieve pain. Put a thin cloth between the cold pack and your skin. A gentle massage might help too. · Be safe with medicines. Take your medicines exactly as prescribed. Call your doctor if you think you are having a problem with your medicine. Your doctor may talk to you about taking antidepressant medicines. These medicines may improve sleep, relieve pain, and in some cases treat depression. · Learn about fibromyalgia. This makes coping easier. Then, take an active role in your treatment. · Think about joining a support group with others who have fibromyalgia to learn more and get support. When should you call for help?   Watch closely for changes in your health, and be sure to contact your doctor if:  ? · You feel sad, helpless, or hopeless; lose interest in things you used to enjoy; or have other symptoms of depression. ? · Your fibromyalgia symptoms get worse. Where can you learn more? Go to http://karolyn-brandin.info/. Enter V003 in the search box to learn more about \"Fibromyalgia: Care Instructions. \"  Current as of: October 14, 2016  Content Version: 11.4  © 2608-7778 [employer]. Care instructions adapted under license by [employer] (which disclaims liability or warranty for this information). If you have questions about a medical condition or this instruction, always ask your healthcare professional. [place] [address] any warranty or liability for your use of this information.

## 2018-04-26 LAB
25(OH)D3 SERPL-MCNC: 41 NG/ML (ref 30–100)
ABSOLUTE BANDS, 67058: NORMAL
ABSOLUTE BLASTS: NORMAL
ABSOLUTE METAMYELOCYTES, 900360: NORMAL
ABSOLUTE MYELOCYTES: NORMAL
ABSOLUTE NRBC,ANRBC: NORMAL
ABSOLUTE PROMYELOCYTES: NORMAL
ALB/GLOBRATIO, 58C: 2.2 (CALC) (ref 1–2.5)
ALBUMIN SERPL-MCNC: 4.7 G/DL (ref 3.6–5.1)
ALP SERPL-CCNC: 88 U/L (ref 33–115)
ALT SERPL-CCNC: 9 U/L (ref 6–29)
AST SERPL W P-5'-P-CCNC: 14 U/L (ref 10–30)
BANDS,BANDS: NORMAL
BASOPHILS # BLD: 31 CELLS/UL (ref 0–200)
BASOPHILS NFR BLD: 0.6 %
BILIRUB SERPL-MCNC: 0.6 MG/DL (ref 0.2–1.2)
BLASTS,BLAST: NORMAL
BUN SERPL-MCNC: 16 MG/DL (ref 7–25)
BUN/CREATININE RATIO,BUCR: NORMAL (CALC) (ref 6–22)
CALCIUM SERPL-MCNC: 9.6 MG/DL (ref 8.6–10.2)
CHLORIDE SERPL-SCNC: 104 MMOL/L (ref 98–110)
CHOL/HDL RATIO,CHHDX: 2.5 (CALC)
CHOLEST SERPL-MCNC: 184 MG/DL
CO2 SERPL-SCNC: 26 MMOL/L (ref 20–31)
COMMENT(S): NORMAL
CREAT SERPL-MCNC: 0.83 MG/DL (ref 0.5–1.1)
EAG (MG/DL),9916804: 88 (CALC)
EAG (MMOL/L),9916805: 4.9 (CALC)
EOSINOPHIL # BLD: 51 CELLS/UL (ref 15–500)
EOSINOPHIL NFR BLD: 1 %
ERYTHROCYTE [DISTWIDTH] IN BLOOD BY AUTOMATED COUNT: 12.1 % (ref 11–15)
GLOBULIN,GLOB: 2.1 G/DL (CALC) (ref 1.9–3.7)
GLUCOSE SERPL-MCNC: 80 MG/DL (ref 65–99)
HBA1C MFR BLD HPLC: 4.7 % OF TOTAL HGB
HCT VFR BLD AUTO: 39 % (ref 35–45)
HDLC SERPL-MCNC: 75 MG/DL
HGB BLD-MCNC: 13.3 G/DL (ref 11.7–15.5)
LDL-CHOLESTEROL: 99 MG/DL (CALC)
LYMPHOCYTES # BLD: 1770 CELLS/UL (ref 850–3900)
LYMPHOCYTES NFR BLD: 34.7 %
MCH RBC QN AUTO: 31.7 PG (ref 27–33)
MCHC RBC AUTO-ENTMCNC: 34.1 G/DL (ref 32–36)
MCV RBC AUTO: 92.9 FL (ref 80–100)
METAMYELOCYTES,METAS: NORMAL
MONOCYTES # BLD: 388 CELLS/UL (ref 200–950)
MONOCYTES NFR BLD: 7.6 %
MYELOCYTES,MYELO: NORMAL
NEUTROPHILS # BLD AUTO: 2861 CELLS/UL (ref 1500–7800)
NEUTROPHILS # BLD: 56.1 %
NON-HDL CHOLESTEROL, 011976: 109 MG/DL (CALC)
NRBC: NORMAL
PLATELET # BLD AUTO: 247 THOUSAND/UL (ref 140–400)
PMV BLD AUTO: 9.3 FL (ref 7.5–12.5)
POTASSIUM SERPL-SCNC: 4.1 MMOL/L (ref 3.5–5.3)
PROMYELOCYTES,PRO: NORMAL
PROT SERPL-MCNC: 6.8 G/DL (ref 6.1–8.1)
RBC # BLD AUTO: 4.2 MILLION/UL (ref 3.8–5.1)
REACTIVE LYMPHS: NORMAL
SODIUM SERPL-SCNC: 137 MMOL/L (ref 135–146)
TRIGL SERPL-MCNC: 35 MG/DL (ref ?–150)
TSH SERPL DL<=0.005 MIU/L-ACNC: 0.94 MIU/L
VIT B12 SERPL-MCNC: 619 PG/ML (ref 200–1100)
WBC # BLD AUTO: 5.1 THOUSAND/UL (ref 3.8–10.8)

## 2018-04-27 NOTE — PROGRESS NOTES
Lab results look great! Normal blood counts. Normal thyroid, sugar level, kidney, and liver tests. Vit D level is at goal. Normal Vit B 12 level.

## 2018-05-01 ENCOUNTER — TELEPHONE (OUTPATIENT)
Dept: OBGYN CLINIC | Age: 28
End: 2018-05-01

## 2018-05-01 RX ORDER — ACETAMINOPHEN AND CODEINE PHOSPHATE 120; 12 MG/5ML; MG/5ML
1 SOLUTION ORAL DAILY
Qty: 3 PACKAGE | Refills: 4 | Status: CANCELLED | OUTPATIENT
Start: 2018-05-01

## 2018-05-02 NOTE — TELEPHONE ENCOUNTER
[id number] Isaiah Landry old patient last seen in the office on 11/9/17 for AE. ?  Ok to refill the Micronor till AE       Please advise

## 2018-05-02 NOTE — TELEPHONE ENCOUNTER
Spoke with the pharmacist at [facility] and advised that the rx is at the store and has refills. I will reach out to the patient via [employer] and alert her that the rx has refills.

## 2018-08-09 ENCOUNTER — OFFICE VISIT (OUTPATIENT)
Dept: FAMILY MEDICINE CLINIC | Age: 28
End: 2018-08-09

## 2018-08-09 VITALS
RESPIRATION RATE: 18 BRPM | HEART RATE: 93 BPM | OXYGEN SATURATION: 98 % | SYSTOLIC BLOOD PRESSURE: 100 MMHG | DIASTOLIC BLOOD PRESSURE: 72 MMHG | WEIGHT: 121 LBS | HEIGHT: 60 IN | TEMPERATURE: 98.4 F | BODY MASS INDEX: 23.75 KG/M2

## 2018-08-09 DIAGNOSIS — F41.9 ANXIETY: ICD-10-CM

## 2018-08-09 DIAGNOSIS — J01.90 ACUTE BACTERIAL RHINOSINUSITIS: Primary | ICD-10-CM

## 2018-08-09 DIAGNOSIS — B96.89 ACUTE BACTERIAL RHINOSINUSITIS: Primary | ICD-10-CM

## 2018-08-09 DIAGNOSIS — F40.243 FEAR OF FLYING: ICD-10-CM

## 2018-08-09 RX ORDER — ALPRAZOLAM 0.5 MG/1
0.5 TABLET ORAL
Qty: 10 TAB | Refills: 0 | Status: SHIPPED | OUTPATIENT
Start: 2018-08-09 | End: 2018-11-20 | Stop reason: ALTCHOICE

## 2018-08-09 RX ORDER — PREDNISONE 5 MG/1
TABLET ORAL
Qty: 21 TAB | Refills: 0 | Status: SHIPPED | OUTPATIENT
Start: 2018-08-09 | End: 2018-11-20 | Stop reason: ALTCHOICE

## 2018-08-09 RX ORDER — CEFDINIR 300 MG/1
300 CAPSULE ORAL 2 TIMES DAILY
Qty: 20 CAP | Refills: 0 | Status: SHIPPED | OUTPATIENT
Start: 2018-08-09 | End: 2018-08-19

## 2018-08-09 NOTE — PROGRESS NOTES
Identified pt with two pt identifiers(name and ). Chief Complaint   Patient presents with    Sinus Pain     began about 2 weeks ago    Cough    Fatigue    Anxiety     would like to dicuss medication after finishing breast feeding. There are no preventive care reminders to display for this patient. Wt Readings from Last 3 Encounters:   18 121 lb (54.9 kg)   18 116 lb (52.6 kg)   17 127 lb 3.2 oz (57.7 kg)     Temp Readings from Last 3 Encounters:   18 98.4 °F (36.9 °C) (Oral)   18 98.2 °F (36.8 °C) (Oral)   10/23/17 99 °F (37.2 °C) (Oral)     BP Readings from Last 3 Encounters:   18 100/72   18 (!) 80/60   17 102/62     Pulse Readings from Last 3 Encounters:   18 93   18 62   10/23/17 (!) 101         Learning Assessment:  :     Learning Assessment 10/16/2015   PRIMARY LEARNER Patient   BARRIERS PRIMARY LEARNER NONE   PRIMARY LANGUAGE ENGLISH   LEARNER PREFERENCE PRIMARY DEMONSTRATION   ANSWERED BY patient   RELATIONSHIP SELF       Depression Screening:  :     PHQ over the last two weeks 2018   Little interest or pleasure in doing things Not at all   Feeling down, depressed, irritable, or hopeless Not at all   Total Score PHQ 2 0       Fall Risk Assessment:  :     No flowsheet data found. Abuse Screening:  :     Abuse Screening Questionnaire 2018   Do you ever feel afraid of your partner? N   Are you in a relationship with someone who physically or mentally threatens you? N   Is it safe for you to go home? Y       Coordination of Care Questionnaire:  :     1) Have you been to an emergency room, urgent care clinic since your last visit? no   Hospitalized since your last visit? no             2) Have you seen or consulted any other health care providers outside of [address] [place] [address] since your last visit? no  (Include any pap smears or colon screenings in this section.)    3) Do you have an Advance Directive on file? no  Are you interested in receiving information about Advance Directives? no    Reviewed record in preparation for visit and have obtained necessary documentation. Medication reconciliation up to date and corrected with patient at this time.

## 2018-08-09 NOTE — PROGRESS NOTES
Subjective:      Lashawn Beverly is a 25 y.o. female who presents for evaluation of possible sinus infection. Symptoms include bilateral ear pressure/pain, congestion, post nasal drip, sinus pressure and sore throat with intermittent fevers. Onset of symptoms was 2 weeks ago, unchanged since that time. She has had very poor PO altogether. Her smell and taste is decreased. She is currently breastfeeding at night. Evaluation to date: none  Treatment to date: Tylenol, Advil, Sudafed, Mucinex, saline sinus rinses, Zyrtec nightly, Flonase     She did not start Elavil as prescribed due to concern of medication use and breastfeeding. She does have history of anxiety which she has been on Lexapro prior to pregnancy. She will be taking a trip and does have fear of flying - will be traveling to [facility] in November 2018. She thinks will have 2 flights there and 2 flights back. Current Outpatient Prescriptions   Medication Sig Dispense Refill    cetirizine (ZYRTEC) 10 mg tablet Take 10 mg by mouth daily.  norethindrone (MICRONOR) 0.35 mg tab Take 1 Tab by mouth daily. 3 Package 4    prenatal multivit-ca-min-fe-fa tab Take  by mouth. Allergies   Allergen Reactions    Amoxicillin Hives    Gluten Unable to Obtain       Past Medical History:   Diagnosis Date    Anxiety     Asthma     exercise induced    Calculus of kidney     Celiac disease     Depression     Epile[address] ([facility] [address]     seizures when 16-13 yo, on medication for 2 years, undetermined cause, no problems since that time    Fibromyalgia     GERD (gastroesophageal reflux disease)     H/O seasonal allergies        Social History   Substance Use Topics    Smoking status: Never Smoker    Smokeless tobacco: Never Used    Alcohol use 0.6 oz/week     1 Standard drinks or equivalent per week      Comment: social        Review of Systems  Pertinent items are noted in HPI.      Objective:     Visit Vitals    /72 (BP 1 Location: Right arm, BP Patient Position: Sitting)  Comment: Manual    Pulse 93    Temp 98.4 °F (36.9 °C) (Oral)  Comment: Manual    Resp 18    Ht 5' (1.524 m)    Wt 121 lb (54.9 kg)    SpO2 98%    BMI 23.63 kg/m2      General appearance - alert, well appearing, and in no distress  Eyes - pupils equal and reactive, extraocular eye movements intact, sclera anicteric  Ears - bilateral mucoid fluid of TM's, canals normal   Nose - mucosal congestion, mucosal erythema and sinus tenderness noted frontal sinuses   Mouth - mucous membranes moist, pharynx normal without lesions  Neck - bilateral symmetric anterior adenopathy  Chest - clear to auscultation, no wheezes, rales or rhonchi, symmetric air entry, no tachypnea, retractions or cyanosis  Heart - normal rate, regular rhythm, normal S1, S2, no murmurs, rubs, clicks or gallops    Assessment/Plan:   Hayes Lundy is a 25 y.o. female seen for:     1. Acute bacterial rhinosinusitis  - predniSONE (STERAPRED) 5 mg dose pack; See administration instruction per 5mg dose pack  Dispense: 21 Tab; Refill: 0  - cefdinir (OMNICEF) 300 mg capsule; Take 1 Cap by mouth two (2) times a day for 10 days. Dispense: 20 Cap; Refill: 0  - Nasal saline rinses as needed for congestion. 2. Anxiety    3. Fear of flying  - ALPRAZolam (XANAX) 0.5 mg tablet; Take 1 Tab by mouth three (3) times daily as needed for Anxiety. Max Daily Amount: 1.5 mg.  Dispense: 10 Tab; Refill: 0    I have discussed the diagnosis with the patient and the intended plan as seen in the above orders. The patient has received an after-visit summary and questions were answered concerning future plans. I have discussed medication side effects and warnings with the patient as well. Patient verbalizes understanding of plan of care and denies further questions or concerns at this time. Informed patient to return to the office if symptoms worsen or if new symptoms arise.     Follow-up Disposition:  Return if symptoms worsen or fail to improve.

## 2018-08-09 NOTE — PATIENT INSTRUCTIONS
Sinusitis: Care Instructions  Your Care Instructions    Sinusitis is an infection of the lining of the sinus cavities in your head. Sinusitis often follows a cold. It causes pain and pressure in your head and face. In most cases, sinusitis gets better on its own in 1 to 2 weeks. But some mild symptoms may last for several weeks. Sometimes antibiotics are needed. Follow-up care is a key part of your treatment and safety. Be sure to make and go to all appointments, and call your doctor if you are having problems. It's also a good idea to know your test results and keep a list of the medicines you take. How can you care for yourself at home? · Take an over-the-counter pain medicine, such as acetaminophen (Tylenol), ibuprofen (Advil, Motrin), or naproxen (Aleve). Read and follow all instructions on the label. · If the doctor prescribed antibiotics, take them as directed. Do not stop taking them just because you feel better. You need to take the full course of antibiotics. · Be careful when taking over-the-counter cold or flu medicines and Tylenol at the same time. Many of these medicines have acetaminophen, which is Tylenol. Read the labels to make sure that you are not taking more than the recommended dose. Too much acetaminophen (Tylenol) can be harmful. · Breathe warm, moist air from a steamy shower, a hot bath, or a sink filled with hot water. Avoid cold, dry air. Using a humidifier in your home may help. Follow the directions for cleaning the machine. · Use saline (saltwater) nasal washes to help keep your nasal passages open and wash out mucus and bacteria. You can buy saline nose drops at a grocery store or drugstore. Or you can make your own at home by adding 1 teaspoon of salt and 1 teaspoon of baking soda to 2 cups of distilled water. If you make your own, fill a bulb syringe with the solution, insert the tip into your nostril, and squeeze gently. Dirkilyn Ply your nose.   · Put a hot, wet towel or a warm gel pack on your face 3 or 4 times a day for 5 to 10 minutes each time. · Try a decongestant nasal spray like oxymetazoline (Afrin). Do not use it for more than 3 days in a row. Using it for more than 3 days can make your congestion worse. When should you call for help? Call your doctor now or seek immediate medical care if:    · You have new or worse swelling or redness in your face or around your eyes.     · You have a new or higher fever.    Watch closely for changes in your health, and be sure to contact your doctor if:    · You have new or worse facial pain.     · The mucus from your nose becomes thicker (like pus) or has new blood in it.     · You are not getting better as expected. Where can you learn more? Go to http://karolyn-brandin.info/. Enter R095 in the search box to learn more about \"Sinusitis: Care Instructions. \"  Current as of: May 12, 2017  Content Version: 11.7  © 0798-2331 [employer]. Care instructions adapted under license by [employer] (which disclaims liability or warranty for this information). If you have questions about a medical condition or this instruction, always ask your healthcare professional. [place] [address] any warranty or liability for your use of this information. Learning About Stress  What is stress? Stress is what you feel when you have to handle more than you are used to. Stress is a fact of life for most people, and it affects everyone differently. What causes stress for you may not be stressful for someone else. A lot of things can cause stress. You may feel stress when you go on a job interview, take a test, or run a race. This kind of short-term stress is normal and even useful. It can help you if you need to work hard or react quickly. For example, stress can help you finish an important job on time. Stress also can last a long time.  Long-term stress is caused by stressful situations or events. Examples of long-term stress include long-term health problems, ongoing problems at work, or conflicts in your family. Long-term stress can harm your health. How does stress affect your health? When you are stressed, your body responds as though you are in danger. It makes hormones that speed up your heart, make you breathe faster, and give you a burst of energy. This is called the fight-or-flight stress response. If the stress is over quickly, your body goes back to normal and no harm is done. But if stress happens too often or lasts too long, it can have bad effects. Long-term stress can make you more likely to get sick, and it can make symptoms of some diseases worse. If you tense up when you are stressed, you may develop neck, shoulder, or low back pain. Stress is linked to high blood pressure and heart disease. Stress also harms your emotional health. It can make you mohamud, tense, or depressed. Your relationships may suffer, and you may not do well at work or school. What can you do to manage stress? How to relax your mind  · Write. It may help to write about things that are bothering you. This helps you find out how much stress you feel and what is causing it. When you know this, you can find better ways to cope. · Let your feelings out. Talk, laugh, cry, and express anger when you need to. Talking with friends, family, a counselor, or a member of the clergy about your feelings is a healthy way to relieve stress. · Do something you enjoy. For example, listen to music or go to a movie. Practice your hobby or do volunteer work. · Meditate. This can help you relax, because you are not worrying about what happened before or what may happen in the future. · Do guided imagery. Imagine yourself in any setting that helps you feel calm. You can use audiotapes, books, or a teacher to guide you. How to relax your body  · Do something active. Exercise or activity can help reduce stress.  Walking is a great way to get started. Even everyday activities such as housecleaning or yard work can help. · Do breathing exercises. For example:  ¨ From a standing position, bend forward from the waist with your knees slightly bent. Let your arms dangle close to the floor. ¨ Breathe in slowly and deeply as you return to a standing position. Roll up slowly and lift your head last.  ¨ Hold your breath for just a few seconds in the standing position. ¨ Breathe out slowly and bend forward from the waist.  · Try yoga or gilda chi. These techniques combine exercise and meditation. You may need some training at first to learn them. What can you do to prevent stress? · Manage your time. This helps you find time to do the things you want and need to do. · Get enough sleep. Your body recovers from the stresses of the day while you are sleeping. · Get support. Your family, friends, and community can make a difference in how you experience stress. Where can you learn more? Go to http://karolyn-brandin.info/. Enter H980 in the search box to learn more about \"Learning About Stress. \"  Current as of: October 10, 2017  Content Version: 11.7  © 1081-7337 [employer]. Care instructions adapted under license by [employer] (which disclaims liability or warranty for this information). If you have questions about a medical condition or this instruction, always ask your healthcare professional. [address] any warranty or liability for your use of this information.

## 2018-08-09 NOTE — MR AVS SNAPSHOT
[address] [address] [address] [facility] 
[phone] Patient: Louann Barber MRN: [id number] POLLO:0/4/8011 Visit Information Date & Time Provider Department Dept. Phone Encounter #  
 8/9/2018  3:45 PM Camille Saint PaulAlfa castro [phone] [id number] Follow-up Instructions Return if symptoms worsen or fail to improve. Upcoming Health Maintenance Date Due Influenza Age 5 to Adult 10/25/2018* PAP AKA CERVICAL CYTOLOGY 8/19/2019 DTaP/Tdap/Td series (2 - Td) 3/30/2027 *Topic was postponed. The date shown is not the original due date. Allergies as of 8/9/2018  Review Complete On: 8/9/2018 By: Camille Lynch MD  
  
 Severity Noted Reaction Type Reactions Amoxicillin  08/13/2014    Hives Gluten  08/13/2014    Unable to Obtain Current Immunizations  Reviewed on 6/25/2017 Name Date Tdap 3/30/2017 Not reviewed this visit You Were Diagnosed With   
  
 Codes Comments Acute bacterial rhinosinusitis    -  Primary ICD-10-CM: J01.90, B96.89 
ICD-9-CM: 461.9 Anxiety     ICD-10-CM: F41.9 ICD-9-CM: 300.00 Fear of flying     ICD-10-CM: Q26.585 ICD-9-CM: 300.29 Vitals BP Pulse Temp Resp Height(growth percentile) Weight(growth percentile) 100/72 (BP 1 Location: Right arm, BP Patient Position: Sitting) 93 98.4 °F (36.9 °C) (Oral) 18 5' (1.524 m) 121 lb (54.9 kg) SpO2 BMI OB Status Smoking Status 98% 23.63 kg/m2 Breastfeeding Never Smoker Vitals History BMI and BSA Data Body Mass Index Body Surface Area  
 23.63 kg/m 2 1.52 m 2 Preferred Pharmacy Pharmacy Name Phone [id number] [address] [phone] Your Updated Medication List  
  
   
This list is accurate as of 8/9/18  4:20 PM.  Always use your most recent med list.  
  
  
  
  
 ALPRAZolam 0.5 mg tablet Commonly known as:  Marcial January Take 1 Tab by mouth three (3) times daily as needed for Anxiety. Max Daily Amount: 1.5 mg.  
  
 cefdinir 300 mg capsule Commonly known as:  OMNICEF Take 1 Cap by mouth two (2) times a day for 10 days. norethindrone 0.35 mg Tab Commonly known as:  Meliton & Meliton Take 1 Tab by mouth daily. predniSONE 5 mg dose pack Commonly known as:  STERAPRED See administration instruction per 5mg dose pack  
  
 prenatal multivit-ca-min-fe-fa Tab Take  by mouth. ZyrTEC 10 mg tablet Generic drug:  cetirizine Take 10 mg by mouth daily. Prescriptions Printed Refills ALPRAZolam (XANAX) 0.5 mg tablet 0 Sig: Take 1 Tab by mouth three (3) times daily as needed for Anxiety. Max Daily Amount: 1.5 mg.  
 Class: Print Route: Oral  
  
Prescriptions Sent to Pharmacy Refills  
 predniSONE (STERAPRED) 5 mg dose pack 0 Sig: See administration instruction per 5mg dose pack Class: Normal  
 Pharmacy: [address] [place] [address] Ph #: [phone]  
 cefdinir (OMNICEF) 300 mg capsule 0 Sig: Take 1 Cap by mouth two (2) times a day for 10 days. Class: Normal  
 Pharmacy: [facility] [address], [address] [place] [address] Ph #: [phone] Route: Oral  
  
Follow-up Instructions Return if symptoms worsen or fail to improve. Patient Instructions Sinusitis: Care Instructions Your Care Instructions Sinusitis is an infection of the lining of the sinus cavities in your head. Sinusitis often follows a cold. It causes pain and pressure in your head and face. In most cases, sinusitis gets better on its own in 1 to 2 weeks. But some mild symptoms may last for several weeks. Sometimes antibiotics are needed. Follow-up care is a key part of your treatment and safety.  Be sure to make and go to all appointments, and call your doctor if you are having problems. It's also a good idea to know your test results and keep a list of the medicines you take. How can you care for yourself at home? · Take an over-the-counter pain medicine, such as acetaminophen (Tylenol), ibuprofen (Advil, Motrin), or naproxen (Aleve). Read and follow all instructions on the label. · If the doctor prescribed antibiotics, take them as directed. Do not stop taking them just because you feel better. You need to take the full course of antibiotics. · Be careful when taking over-the-counter cold or flu medicines and Tylenol at the same time. Many of these medicines have acetaminophen, which is Tylenol. Read the labels to make sure that you are not taking more than the recommended dose. Too much acetaminophen (Tylenol) can be harmful. · Breathe warm, moist air from a steamy shower, a hot bath, or a sink filled with hot water. Avoid cold, dry air. Using a humidifier in your home may help. Follow the directions for cleaning the machine. · Use saline (saltwater) nasal washes to help keep your nasal passages open and wash out mucus and bacteria. You can buy saline nose drops at a grocery store or drugstore. Or you can make your own at home by adding 1 teaspoon of salt and 1 teaspoon of baking soda to 2 cups of distilled water. If you make your own, fill a bulb syringe with the solution, insert the tip into your nostril, and squeeze gently. Evone Bread your nose. · Put a hot, wet towel or a warm gel pack on your face 3 or 4 times a day for 5 to 10 minutes each time. · Try a decongestant nasal spray like oxymetazoline (Afrin). Do not use it for more than 3 days in a row. Using it for more than 3 days can make your congestion worse. When should you call for help? Call your doctor now or seek immediate medical care if: 
  · You have new or worse swelling or redness in your face or around your eyes.  
  · You have a new or higher fever.  Watch closely for changes in your health, and be sure to contact your doctor if: 
  · You have new or worse facial pain.  
  · The mucus from your nose becomes thicker (like pus) or has new blood in it.  
  · You are not getting better as expected. Where can you learn more? Go to http://karolyn-brandin.info/. Enter B907 in the search box to learn more about \"Sinusitis: Care Instructions. \" Current as of: May 12, 2017 Content Version: 11.7 © 4799-0140 [employer]. Care instructions adapted under license by Kick[employer] (which disclaims liability or warranty for this information). If you have questions about a medical condition or this instruction, always ask your healthcare professional. [address] any warranty or liability for your use of this information. Learning About Stress What is stress? Stress is what you feel when you have to handle more than you are used to. Stress is a fact of life for most people, and it affects everyone differently. What causes stress for you may not be stressful for someone else. A lot of things can cause stress. You may feel stress when you go on a job interview, take a test, or run a race. This kind of short-term stress is normal and even useful. It can help you if you need to work hard or react quickly. For example, stress can help you finish an important job on time. Stress also can last a long time. Long-term stress is caused by stressful situations or events. Examples of long-term stress include long-term health problems, ongoing problems at work, or conflicts in your family. Long-term stress can harm your health. How does stress affect your health? When you are stressed, your body responds as though you are in danger. It makes hormones that speed up your heart, make you breathe faster, and give you a burst of energy. This is called the fight-or-flight stress response. If the stress is over quickly, your body goes back to normal and no harm is done. But if stress happens too often or lasts too long, it can have bad effects. Long-term stress can make you more likely to get sick, and it can make symptoms of some diseases worse. If you tense up when you are stressed, you may develop neck, shoulder, or low back pain. Stress is linked to high blood pressure and heart disease. Stress also harms your emotional health. It can make you mohamud, tense, or depressed. Your relationships may suffer, and you may not do well at work or school. What can you do to manage stress? How to relax your mind · Write. It may help to write about things that are bothering you. This helps you find out how much stress you feel and what is causing it. When you know this, you can find better ways to cope. · Let your feelings out. Talk, laugh, cry, and express anger when you need to. Talking with friends, family, a counselor, or a member of the clergy about your feelings is a healthy way to relieve stress. · Do something you enjoy. For example, listen to music or go to a movie. Practice your hobby or do volunteer work. · Meditate. This can help you relax, because you are not worrying about what happened before or what may happen in the future. · Do guided imagery. Imagine yourself in any setting that helps you feel calm. You can use audiotapes, books, or a teacher to guide you. How to relax your body · Do something active. Exercise or activity can help reduce stress. Walking is a great way to get started. Even everyday activities such as housecleaning or yard work can help. · Do breathing exercises. For example: ¨ From a standing position, bend forward from the waist with your knees slightly bent. Let your arms dangle close to the floor. ¨ Breathe in slowly and deeply as you return to a standing position.  Roll up slowly and lift your head last. 
 ¨ Hold your breath for just a few seconds in the standing position. ¨ Breathe out slowly and bend forward from the waist. 
· Try yoga or gilda chi. These techniques combine exercise and meditation. You may need some training at first to learn them. What can you do to prevent stress? · Manage your time. This helps you find time to do the things you want and need to do. · Get enough sleep. Your body recovers from the stresses of the day while you are sleeping. · Get support. Your family, friends, and community can make a difference in how you experience stress. Where can you learn more? Go to http://karolyn-brandin.info/. Enter J855 in the search box to learn more about \"Learning About Stress. \" Current as of: October 10, 2017 Content Version: 11.7 © 4625-9869 [employer]. Care instructions adapted under license by [employer]ity (which disclaims liability or warranty for this information). If you have questions about a medical condition or this instruction, always ask your healthcare professional. [address] [address] any warranty or liability for your use of this information. Introducing [facility]! Dear Adryan Machado: Thank you for requesting a TopFun account. Our records indicate that you already have an active TopFun account. You can access your account anytime at [email or web address] Did you know that you can access your hospital and ER discharge instructions at any time in TopFun? You can also review all of your test results from your hospital stay or ER visit. Additional Information If you have questions, please visit the Frequently Asked Questions section of the TopFun website at [email or web address] Remember, TopFun is NOT to be used for urgent needs. For medical emergencies, dial 911. Now available from your iPhone and Android! Please provide this summary of care documentation to your next provider. Your primary care clinician is listed as [place]. If you have any questions after today's visit, please call [phone].

## 2018-09-14 ENCOUNTER — TELEPHONE (OUTPATIENT)
Dept: OBGYN CLINIC | Age: 28
End: 2018-09-14

## 2018-11-20 ENCOUNTER — OFFICE VISIT (OUTPATIENT)
Dept: OBGYN CLINIC | Age: 28
End: 2018-11-20

## 2018-11-20 VITALS — BODY MASS INDEX: 24.66 KG/M2 | WEIGHT: 125.6 LBS | HEIGHT: 60 IN

## 2018-11-20 DIAGNOSIS — Z11.51 SPECIAL SCREENING EXAMINATION FOR HUMAN PAPILLOMAVIRUS (HPV): ICD-10-CM

## 2018-11-20 DIAGNOSIS — Z01.419 ENCOUNTER FOR GYNECOLOGICAL EXAMINATION WITHOUT ABNORMAL FINDING: Primary | ICD-10-CM

## 2018-11-20 NOTE — PROGRESS NOTES
Vandana Tijerina is a ,  25 y.o. female [facility] whose was on 2018 who presents for her annual checkup. She is having no significant problems. With regard to the Gardisil vaccine, she is older than the FDA approved age to receive it. Menstrual status:    Her periods are moderate in flow. She is using three to five pads or tampons per day, usually regular but she just recently weaned from breastfeeding and switched back to the pill. .    She denies dysmenorrhea. She reports no premenstrual symptoms. Contraception:    The current method of family planning is OCP (estrogen/progesterone) and She declines contraception and counseling. Sexual history:    She  reports that she currently engages in sexual activity and has had partners who are Male. She reports using the following method of birth control/protection: Pill. Medical conditions:    Since her last annual GYN exam about 17 ago, she has not the following changes in her health history: none. Pap and Mammogram History:    Her most recent Pap smear was normal obtained 16 year(s) ago. The patient has never had a mammogram.    The patient does not have a family history of breast cancer. Past Medical History:   Diagnosis Date    Anxiety     Asthma     exercise induced    Calculus of kidney     Celiac disease     Depression     Epilepsy ([facility] [address]     seizures when 16-15 yo, on medication for 2 years, undetermined cause, no problems since that time    Fibromyalgia     GERD (gastroesophageal reflux disease)     H/O seasonal allergies      Past Surgical History:   Procedure Laterality Date    HX GYN      HX TONSILLECTOMY         Current Outpatient Medications   Medication Sig Dispense Refill    norethindrone-e estradiol-iron (LOMEDIA 24 FE) 1 mg-20 mcg (24)/75 mg (4) tab Take 1 Tab by mouth daily. 3 Package 0    cetirizine (ZYRTEC) 10 mg tablet Take 10 mg by mouth daily.       prenatal multivit-ca-min-fe-fa tab Take  by mouth.  predniSONE (STERAPRED) 5 mg dose pack See administration instruction per 5mg dose pack 21 Tab 0    ALPRAZolam (XANAX) 0.5 mg tablet Take 1 Tab by mouth three (3) times daily as needed for Anxiety. Max Daily Amount: 1.5 mg. 10 Tab 0    norethindrone (MICRONOR) 0.35 mg tab Take 1 Tab by mouth daily. 3 Package 4     Allergies: Amoxicillin and Gluten   Social History     Socioeconomic History    Marital status:      Spouse name: Not on file    Number of children: Not on file    Years of education: Not on file    Highest education level: Not on file   Social Needs    Financial resource strain: Not on file    Food insecurity - worry: Not on file    Food insecurity - inability: Not on file    Transportation needs - medical: Not on file   [employer] needs - non-medical: Not on file   Occupational History    Not on file   Tobacco Use    Smoking status: Never Smoker    Smokeless tobacco: Never Used   Substance and Sexual Activity    Alcohol use: Yes     Alcohol/week: 0.6 oz     Types: 1 Standard drinks or equivalent per week     Comment: social    Drug use: No    Sexual activity: Yes     Partners: Male     Birth control/protection: Pill   Other Topics Concern    Not on file   Social History Narrative    Not on file     Tobacco History:  reports that  has never smoked. she has never used smokeless tobacco.  Alcohol Abuse:  reports that she drinks about 0.6 oz of alcohol per week. Drug Abuse:  reports that she does not use drugs.     Patient Active Problem List   Diagnosis Code    H/O seasonal allergies Z88.9    Fibromyalgia M79.7    Celiac disease K90.0       Review of Systems - History obtained from the patient  Constitutional: negative for weight loss, fever, night sweats  HEENT: negative for hearing loss, earache, congestion, snoring, sorethroat  CV: negative for chest pain, palpitations, edema  Resp: negative for cough, shortness of breath, wheezing  GI: negative for change in bowel habits, abdominal pain, black or bloody stools  : negative for frequency, dysuria, hematuria, vaginal discharge  MSK: negative for back pain, joint pain, muscle pain  Breast: negative for breast lumps, nipple discharge, galactorrhea  Skin :negative for itching, rash, hives  Neuro: negative for dizziness, headache, confusion, weakness  Psych: negative for anxiety, depression, change in mood  Heme/lymph: negative for bleeding, bruising, pallor    Physical Exam    Visit Vitals  Ht 5' (1.524 m)   Wt 125 lb 9.6 oz (57 kg)   LMP 11/06/2018   BMI 24.53 kg/m²       Constitutional  · Appearance: well-nourished, well developed, alert, in no acute distress    HENT  · Head and Face: appears normal    Neck  · Inspection/Palpation: normal appearance, no masses or tenderness  · Lymph Nodes: no lymphadenopathy present  · Thyroid: gland size normal, nontender, no nodules or masses present on palpation    Chest  · Respiratory Effort: breathing normal  · Auscultation: normal breath sounds    Cardiovascular  · Heart:  · Auscultation: regular rate and rhythm without murmur    Breasts  · Inspection of Breasts: breasts symmetrical, no skin changes, no discharge present, nipple appearance normal, no skin retraction present  · Palpation of Breasts and Axillae: no masses present on palpation, no breast tenderness  · Axillary Lymph Nodes: no lymphadenopathy present    Gastrointestinal  · Abdominal Examination: abdomen non-tender to palpation, normal bowel sounds, no masses present  · Liver and spleen: no hepatomegaly present, spleen not palpable  · Hernias: no hernias identified    Genitourinary  · External Genitalia: normal appearance for age, no discharge present, no tenderness present, no inflammatory lesions present, no masses present, no atrophy present  · Vagina: normal vaginal vault without central or paravaginal defects, no discharge present, no inflammatory lesions present, no masses present  · Bladder: non-tender to palpation  · Urethra: appears normal  · Cervix: normal   · Uterus: normal size, shape and consistency  · Adnexa: no adnexal tenderness present, no adnexal masses present  · Perineum: perineum within normal limits, no evidence of trauma, no rashes or skin lesions present  · Anus: anus within normal limits, no hemorrhoids present  · Inguinal Lymph Nodes: no lymphadenopathy present    Skin  · General Inspection: no rash, no lesions identified    Neurologic/Psychiatric  · Mental Status:  · Orientation: grossly oriented to person, place and time  · Mood and Affect: mood normal, affect appropriate    . Assessment:  Routine gynecologic examination  Her current medical status is satisfactory with no evidence of significant gynecologic issues.     Plan:  Counseled re: diet, exercise, healthy lifestyle  Return for yearly wellness visits  Pt counseled regarding co-testing for high risk HPV with pap  Cont OC

## 2018-11-22 LAB
CYTOLOGIST CVX/VAG CYTO: NORMAL
CYTOLOGY CVX/VAG DOC THIN PREP: NORMAL
DX ICD CODE: NORMAL
HPV I/H RISK 1 DNA CVX QL PROBE+SIG AMP: NEGATIVE
Lab: NORMAL
OTHER STN SPEC: NORMAL
PATH REPORT.FINAL DX SPEC: NORMAL
STAT OF ADQ CVX/VAG CYTO-IMP: NORMAL

## 2019-01-08 ENCOUNTER — OFFICE VISIT (OUTPATIENT)
Dept: FAMILY MEDICINE CLINIC | Age: 29
End: 2019-01-08

## 2019-01-08 VITALS
SYSTOLIC BLOOD PRESSURE: 119 MMHG | RESPIRATION RATE: 16 BRPM | TEMPERATURE: 99.1 F | HEART RATE: 67 BPM | WEIGHT: 127 LBS | OXYGEN SATURATION: 100 % | HEIGHT: 60 IN | BODY MASS INDEX: 24.94 KG/M2 | DIASTOLIC BLOOD PRESSURE: 76 MMHG

## 2019-01-08 DIAGNOSIS — R10.10 PAIN OF UPPER ABDOMEN: ICD-10-CM

## 2019-01-08 DIAGNOSIS — M79.7 FIBROMYALGIA: Primary | ICD-10-CM

## 2019-01-08 DIAGNOSIS — J01.00 ACUTE NON-RECURRENT MAXILLARY SINUSITIS: ICD-10-CM

## 2019-01-08 RX ORDER — AZITHROMYCIN 250 MG/1
TABLET, FILM COATED ORAL
Qty: 6 TAB | Refills: 0 | Status: SHIPPED | OUTPATIENT
Start: 2019-01-08 | End: 2019-01-13

## 2019-01-08 RX ORDER — AMITRIPTYLINE HYDROCHLORIDE 25 MG/1
25 TABLET, FILM COATED ORAL
Qty: 90 TAB | Refills: 1 | Status: SHIPPED | OUTPATIENT
Start: 2019-01-08 | End: 2019-07-30 | Stop reason: SDUPTHER

## 2019-01-08 RX ORDER — PREDNISONE 5 MG/1
TABLET ORAL
Qty: 21 TAB | Refills: 0 | Status: SHIPPED | OUTPATIENT
Start: 2019-01-08 | End: 2019-07-19 | Stop reason: ALTCHOICE

## 2019-01-08 RX ORDER — AMITRIPTYLINE HYDROCHLORIDE 10 MG/1
10 TABLET, FILM COATED ORAL
Refills: 1 | COMMUNITY
Start: 2018-10-18 | End: 2019-01-08 | Stop reason: DRUGHIGH

## 2019-01-08 NOTE — PROGRESS NOTES
HISTORY OF PRESENT ILLNESS  Suresh Puente is a 30 y.o. female. HPI  Pt presents with \"possible gallbladder disease\"    Pt states that she has noted over the past year and a half, after having her son, that she will get flare ups of abdominal pain. The pain usually starts in the epigastric area, and at times, will radiate to the right upper abdomen/under her rib cage. She states that the pain comes and goes, and is usually worse with eating certain things. She has no vomiting, and no diarrhea. No fever. In addition, she believes that she may have a sinus infection. Nasal congestion  Sore throat  Cough is starting today  No fever    Lastly, patient is interested in increasing her amitriptyline. Pt states that this was decreased when she was pregnant and breastfeeding, and she notes an increase in pain at night, and difficulty sleeping due tot his. She states that prior to getting pregnant she was on 25 mg of Elavil, and she would like to restart this dosage if possible. Review of Systems   Constitutional: Negative for fever. HENT: Positive for congestion and sinus pain. Respiratory: Positive for cough and sputum production. Gastrointestinal: Positive for abdominal pain. Negative for diarrhea and vomiting. Physical Exam   Constitutional: She is oriented to person, place, and time. She appears well-developed and well-nourished. HENT:   Head: Normocephalic and atraumatic. Right Ear: Hearing, tympanic membrane, external ear and ear canal normal.   Left Ear: Hearing, tympanic membrane, external ear and ear canal normal.   Nose: Mucosal edema and rhinorrhea present. Right sinus exhibits maxillary sinus tenderness. Left sinus exhibits maxillary sinus tenderness. Mouth/Throat: Oropharynx is clear and moist.   Neck: Normal range of motion. Neck supple. Cardiovascular: Normal rate, regular rhythm and normal heart sounds.    Pulmonary/Chest: Effort normal and breath sounds normal. She has no wheezes. She has no rales. Abdominal: Soft. Bowel sounds are normal. She exhibits no mass. There is no tenderness. There is no rebound and no guarding. Lymphadenopathy:     She has no cervical adenopathy. Neurological: She is alert and oriented to person, place, and time. Skin: Skin is warm and dry. Psychiatric: She has a normal mood and affect. Her behavior is normal.       ASSESSMENT and PLAN    ICD-10-CM ICD-9-CM    1. Fibromyalgia M79.7 729.1 amitriptyline (ELAVIL) 25 mg tablet   2. Acute non-recurrent maxillary sinusitis J01.00 461.0 azithromycin (ZITHROMAX) 250 mg tablet      predniSONE (STERAPRED) 5 mg dose pack   3. Pain of upper abdomen R10.10 789.09 US ABD LTD     Informed patient that I have sent medication to the pharmacy, and she should take as prescribed. Educated about staying well hydrated, and treating fever as needed. We will notify her when [address] returns, and inform her of any change in plan of care at that time. Pt informed to return to office with worsening of symptoms, or PRN with any questions or concerns. Pt verbalizes understanding of plan of care and denies further questions or concerns at this time.

## 2019-01-08 NOTE — PROGRESS NOTES
Identified pt with two pt identifiers(name and ). Chief Complaint   Patient presents with    Gallbladder Attack     after eating sometimes will get a pain under chest bone and radiate over to the right side of her chest - sx first started occurring after son was born 1.5 years ago and it has progressively become worse    Sinus Infection         Health Maintenance Due   Topic    Influenza Age 5 to Adult        Wt Readings from Last 3 Encounters:   19 127 lb (57.6 kg)   18 125 lb 9.6 oz (57 kg)   18 121 lb (54.9 kg)     Temp Readings from Last 3 Encounters:   19 99.1 °F (37.3 °C) (Oral)   18 98.4 °F (36.9 °C) (Oral)   18 98.2 °F (36.8 °C) (Oral)     BP Readings from Last 3 Encounters:   19 119/76   18 100/72   18 (!) 80/60     Pulse Readings from Last 3 Encounters:   19 67   18 93   18 62         Learning Assessment:  :     Learning Assessment 10/16/2015   PRIMARY LEARNER Patient   BARRIERS PRIMARY LEARNER NONE   PRIMARY LANGUAGE ENGLISH   LEARNER PREFERENCE PRIMARY DEMONSTRATION   ANSWERED BY patient   RELATIONSHIP SELF       Depression Screening:  :     PHQ over the last two weeks 2018   Little interest or pleasure in doing things Not at all   Feeling down, depressed, irritable, or hopeless Not at all   Total Score PHQ 2 0       Fall Risk Assessment:  :     No flowsheet data found. Abuse Screening:  :     Abuse Screening Questionnaire 2018   Do you ever feel afraid of your partner? N   Are you in a relationship with someone who physically or mentally threatens you? N   Is it safe for you to go home?  Y       Coordination of Care Questionnaire:  :     1) Have you been to an emergency room, urgent care clinic since your last visit? no   Hospitalized since your last visit? no             2) Have you seen or consulted any other health care providers outside of [address] [place] [address] since your last visit? no  (Include any pap smears or colon screenings in this section.)    3) Do you have an Advance Directive on file? no  Are you interested in receiving information about Advance Directives? no    Patient is accompanied by self. Reviewed record in preparation for visit and have obtained necessary documentation. Medication reconciliation up to date and corrected with patient at this time.

## 2019-01-08 NOTE — PATIENT INSTRUCTIONS

## 2019-01-17 DIAGNOSIS — M79.7 FIBROMYALGIA: ICD-10-CM

## 2019-01-17 RX ORDER — AMITRIPTYLINE HYDROCHLORIDE 10 MG/1
TABLET, FILM COATED ORAL
Qty: 90 TAB | Refills: 1 | OUTPATIENT
Start: 2019-01-17

## 2019-07-19 ENCOUNTER — OFFICE VISIT (OUTPATIENT)
Dept: FAMILY MEDICINE CLINIC | Age: 29
End: 2019-07-19

## 2019-07-19 VITALS
SYSTOLIC BLOOD PRESSURE: 102 MMHG | TEMPERATURE: 98.8 F | RESPIRATION RATE: 18 BRPM | HEART RATE: 96 BPM | WEIGHT: 118 LBS | BODY MASS INDEX: 23.16 KG/M2 | DIASTOLIC BLOOD PRESSURE: 78 MMHG | OXYGEN SATURATION: 95 % | HEIGHT: 60 IN

## 2019-07-19 DIAGNOSIS — J01.90 ACUTE BACTERIAL RHINOSINUSITIS: Primary | ICD-10-CM

## 2019-07-19 DIAGNOSIS — B96.89 ACUTE BACTERIAL RHINOSINUSITIS: Primary | ICD-10-CM

## 2019-07-19 RX ORDER — PREDNISONE 5 MG/1
TABLET ORAL
Qty: 21 TAB | Refills: 0 | Status: SHIPPED | OUTPATIENT
Start: 2019-07-19 | End: 2020-01-08 | Stop reason: ALTCHOICE

## 2019-07-19 RX ORDER — CEFDINIR 300 MG/1
300 CAPSULE ORAL 2 TIMES DAILY
Qty: 20 CAP | Refills: 0 | Status: SHIPPED | OUTPATIENT
Start: 2019-07-19 | End: 2019-07-29

## 2019-07-19 NOTE — PATIENT INSTRUCTIONS
Sinusitis: Care Instructions  Your Care Instructions    Sinusitis is an infection of the lining of the sinus cavities in your head. Sinusitis often follows a cold. It causes pain and pressure in your head and face. In most cases, sinusitis gets better on its own in 1 to 2 weeks. But some mild symptoms may last for several weeks. Sometimes antibiotics are needed. Follow-up care is a key part of your treatment and safety. Be sure to make and go to all appointments, and call your doctor if you are having problems. It's also a good idea to know your test results and keep a list of the medicines you take. How can you care for yourself at home? · Take an over-the-counter pain medicine, such as acetaminophen (Tylenol), ibuprofen (Advil, Motrin), or naproxen (Aleve). Read and follow all instructions on the label. · If the doctor prescribed antibiotics, take them as directed. Do not stop taking them just because you feel better. You need to take the full course of antibiotics. · Be careful when taking over-the-counter cold or flu medicines and Tylenol at the same time. Many of these medicines have acetaminophen, which is Tylenol. Read the labels to make sure that you are not taking more than the recommended dose. Too much acetaminophen (Tylenol) can be harmful. · Breathe warm, moist air from a steamy shower, a hot bath, or a sink filled with hot water. Avoid cold, dry air. Using a humidifier in your home may help. Follow the directions for cleaning the machine. · Use saline (saltwater) nasal washes to help keep your nasal passages open and wash out mucus and bacteria. You can buy saline nose drops at a grocery store or drugstore. Or you can make your own at home by adding 1 teaspoon of salt and 1 teaspoon of baking soda to 2 cups of distilled water. If you make your own, fill a bulb syringe with the solution, insert the tip into your nostril, and squeeze gently. [place] your nose.   · Put a hot, wet towel or a warm gel pack on your face 3 or 4 times a day for 5 to 10 minutes each time. · Try a decongestant nasal spray like oxymetazoline (Afrin). Do not use it for more than 3 days in a row. Using it for more than 3 days can make your congestion worse. When should you call for help? Call your doctor now or seek immediate medical care if:    · You have new or worse swelling or redness in your face or around your eyes.     · You have a new or higher fever.    Watch closely for changes in your health, and be sure to contact your doctor if:    · You have new or worse facial pain.     · The mucus from your nose becomes thicker (like pus) or has new blood in it.     · You are not getting better as expected. Where can you learn more? Go to http://karolyn-brandin.info/. Enter X403 in the search box to learn more about \"Sinusitis: Care Instructions. \"  Current as of: March 27, 2018  Content Version: 11.9  © 5075-8926 [employer]. Care instructions adapted under license by [employer] (which disclaims liability or warranty for this information). If you have questions about a medical condition or this instruction, always ask your healthcare professional. [place] [address] any warranty or liability for your use of this information.

## 2019-07-19 NOTE — PROGRESS NOTES
Subjective:      Renetta Guy is a 30 y.o. female who presents for evaluation of possible sinus infection. Symptoms include bilateral ear pressure/pain, congestion, nasal congestion, post nasal drip and sinus pressure with chills, low grade fever. Onset of symptoms was 2 weeks ago, gradually worsening since that time. Evaluation to date: none  Treatment to date: OTC analgesic, Sudafed, allergy medication    Current Outpatient Medications   Medication Sig Dispense Refill    amitriptyline (ELAVIL) 25 mg tablet Take 1 Tab by mouth nightly. 90 Tab 1    norethindrone-e estradiol-iron (LOMEDIA 24 FE) 1 mg-20 mcg (24)/75 mg (4) tab Take 1 Tab by mouth daily. 3 Package 4    cetirizine (ZYRTEC) 10 mg tablet Take 10 mg by mouth daily. Allergies   Allergen Reactions    Amoxicillin Hives    Gluten Unable to Obtain       Past Medical History:   Diagnosis Date    Anxiety     Asthma     exercise induced    Calculus of kidney     Celiac disease     Depression     Epilepsy ([facility] [address]     seizures when 16-13 yo, on medication for 2 years, undetermined cause, no problems since that time    Fibromyalgia     GERD (gastroesophageal reflux disease)     H/O seasonal allergies        Social History     Tobacco Use    Smoking status: Never Smoker    Smokeless tobacco: Never Used   Substance Use Topics    Alcohol use: Yes     Alcohol/week: 1.0 standard drinks     Types: 1 Standard drinks or equivalent per week     Comment: social        Review of Systems  Pertinent items are noted in HPI. Objective:     Visit Vitals  /78 (BP 1 Location: Right arm, BP Patient Position: Sitting) Comment: Manual   Pulse 96   Temp 98.8 °F (37.1 °C) (Oral) Comment: .   Resp 18   Ht 5' (1.524 m)   Wt 118 lb (53.5 kg)   LMP 07/13/2019 (Exact Date)   SpO2 95%   Breastfeeding?  No   BMI 23.05 kg/m²      General appearance - alert, well appearing, and in no distress  Eyes - pupils equal and reactive, extraocular eye movements intact, sclera anicteric  Ears - bilateral TM's and external ear canals normal  Nose - mucosal congestion, mucosal erythema and sinus tenderness noted frontal and maxillary sinuses   Mouth - mucous membranes moist, pharynx normal without lesions  Neck - bilateral symmetric anterior adenopathy  Chest - clear to auscultation, no wheezes, rales or rhonchi, symmetric air entry, no tachypnea, retractions or cyanosis  Heart - normal rate, regular rhythm, normal S1, S2, no murmurs, rubs, clicks or gallops    Assessment/Plan:   Franck Saldana is a 30 y.o. female seen for:     1. Acute bacterial rhinosinusitis  - cefdinir (OMNICEF) 300 mg capsule; Take 1 Cap by mouth two (2) times a day for 10 days. Dispense: 20 Cap; Refill: 0  - predniSONE (STERAPRED) 5 mg dose pack; See administration instruction per 5mg dose pack  Dispense: 21 Tab; Refill: 0  - Nasal saline rinses as needed for congestion, OTC analgesic of choice for pain     I have discussed the diagnosis with the patient and the intended plan as seen in the above orders. The patient has received an after-visit summary and questions were answered concerning future plans. I have discussed medication side effects and warnings with the patient as well. Informed patient to return to the office if symptoms worsen or if new symptoms arise. Follow-up and Dispositions    · Return if symptoms worsen or fail to improve.

## 2019-07-19 NOTE — PROGRESS NOTES
Identified pt with two pt identifiers(name and ). Chief Complaint   Patient presents with    Sinus Pain     Began about 2 weeks ago        There are no preventive care reminders to display for this patient. Wt Readings from Last 3 Encounters:   19 118 lb (53.5 kg)   19 127 lb (57.6 kg)   18 125 lb 9.6 oz (57 kg)     Temp Readings from Last 3 Encounters:   19 98.8 °F (37.1 °C) (Oral)   19 99.1 °F (37.3 °C) (Oral)   18 98.4 °F (36.9 °C) (Oral)     BP Readings from Last 3 Encounters:   19 102/78   19 119/76   18 100/72     Pulse Readings from Last 3 Encounters:   19 96   19 67   18 93         Learning Assessment:  :     Learning Assessment 10/16/2015   PRIMARY LEARNER Patient   BARRIERS PRIMARY LEARNER NONE   PRIMARY LANGUAGE ENGLISH   LEARNER PREFERENCE PRIMARY DEMONSTRATION   ANSWERED BY patient   RELATIONSHIP SELF       Depression Screening:  :     3 most recent PHQ Screens 2018   Little interest or pleasure in doing things Not at all   Feeling down, depressed, irritable, or hopeless Not at all   Total Score PHQ 2 0       Fall Risk Assessment:  :     No flowsheet data found. Abuse Screening:  :     Abuse Screening Questionnaire 2018   Do you ever feel afraid of your partner? N   Are you in a relationship with someone who physically or mentally threatens you? N   Is it safe for you to go home? Y       Coordination of Care Questionnaire:  :     1) Have you been to an emergency room, urgent care clinic since your last visit? no   Hospitalized since your last visit? no             2) Have you seen or consulted any other health care providers outside of [address] [place] [address] since your last visit? no  (Include any pap smears or colon screenings in this section.)    3) Do you have an Advance Directive on file? no  Are you interested in receiving information about Advance Directives?  no    Reviewed record in preparation for visit and have obtained necessary documentation. Medication reconciliation up to date and corrected with patient at this time.

## 2019-07-30 DIAGNOSIS — M79.7 FIBROMYALGIA: ICD-10-CM

## 2019-07-31 RX ORDER — AMITRIPTYLINE HYDROCHLORIDE 25 MG/1
TABLET, FILM COATED ORAL
Qty: 90 TAB | Refills: 1 | Status: SHIPPED | OUTPATIENT
Start: 2019-07-31 | End: 2020-01-08 | Stop reason: SDUPTHER

## 2019-10-25 ENCOUNTER — OFFICE VISIT (OUTPATIENT)
Dept: OBGYN CLINIC | Age: 29
End: 2019-10-25

## 2019-10-25 VITALS — WEIGHT: 127 LBS | BODY MASS INDEX: 24.8 KG/M2 | SYSTOLIC BLOOD PRESSURE: 116 MMHG | DIASTOLIC BLOOD PRESSURE: 68 MMHG

## 2019-10-25 DIAGNOSIS — R35.0 FREQUENCY OF URINATION: Primary | ICD-10-CM

## 2019-10-25 DIAGNOSIS — N89.8 VAGINAL DISCHARGE: ICD-10-CM

## 2019-10-25 LAB
BILIRUB UR QL STRIP: NEGATIVE
GLUCOSE UR-MCNC: NEGATIVE MG/DL
KETONES P FAST UR STRIP-MCNC: NEGATIVE MG/DL
PH UR STRIP: 6 [PH] (ref 4.6–8)
PROT UR QL STRIP: NEGATIVE
SP GR UR STRIP: 1 (ref 1–1.03)
UA UROBILINOGEN AMB POC: NORMAL (ref 0.2–1)
URINALYSIS CLARITY POC: CLEAR
URINALYSIS COLOR POC: YELLOW
URINE BLOOD POC: NEGATIVE
URINE LEUKOCYTES POC: NORMAL
URINE NITRITES POC: NEGATIVE
WET MOUNT POCT, WMPOCT: NORMAL

## 2019-10-25 RX ORDER — BISMUTH SUBSALICYLATE 262 MG
1 TABLET,CHEWABLE ORAL DAILY
COMMUNITY

## 2019-10-25 NOTE — PROGRESS NOTES
[facility]  [email or web address]  [phone]    Shelli Pepe MD, FACOG       OB/GYN Problem visit    Chief Complaint:   Chief Complaint   Patient presents with    Urinary Frequency    Vaginal Discharge    Mass     right groin area       History of Present Illness: This is a new problem being evaluated by this provider. The patient is a 30 y.o.  female who reports having right swollen groin gland,  low back pain, clear vaginal discharge and urinary frequency for 1 days. She reports the symptoms are is moderately worse. Aggravating factors include movement. Alleviating factors include not moving. She reports no new sexual partners. She does not have other concerns. LMP: No LMP recorded. (Menstrual status: Medically Induced). PFSH:  Past Medical History:   Diagnosis Date    Anxiety     Asthma     exercise induced    Calculus of kidney     Celiac disease     Depression     [facility]ile[facility] ([facility] [address]     seizures when 16-15 yo, on medication for 2 years, undetermined cause, no problems since that time    Fibromyalgia     GERD (gastroesophageal reflux disease)     H/O seasonal allergies      Past Surgical History:   Procedure Laterality Date    HX GYN      HX TONSILLECTOMY       Family History   Problem Relation Age of Onset    Elevated Lipids Father     Hypertension Father     No Known Problems Mother      Social History     Tobacco Use    Smoking status: Never Smoker    Smokeless tobacco: Never Used   Substance Use Topics    Alcohol use: Yes     Alcohol/week: 1.0 standard drinks     Types: 1 Standard drinks or equivalent per week     Comment: social    Drug use: No     Allergies   Allergen Reactions    Amoxicillin Hives    Gluten Unable to Obtain     Current Outpatient Medications   Medication Sig    Lactobacillus acidophilus (PROBIOTIC PO) Take  by mouth.  multivitamin (ONE A DAY) tablet Take 1 Tab by mouth daily.     amitriptyline (ELAVIL) 25 mg tablet TAKE 1 TABLET BY MOUTH EVERY DAY AT NIGHT    norethindrone-e estradiol-iron (LOMEDIA 24 FE) 1 mg-20 mcg (24)/75 mg (4) tab Take 1 Tab by mouth daily.  cetirizine (ZYRTEC) 10 mg tablet Take 10 mg by mouth daily.  predniSONE (STERAPRED) 5 mg dose pack See administration instruction per 5mg dose pack     No current facility-administered medications for this visit. Review of Systems:  History obtained from the patient  Constitutional: negative for fevers, chills and weight loss  ENT ROS: negative for - hearing change, oral lesions or visual changes  Respiratory: negative for cough, wheezing or dyspnea on exertion  Cardiovascular: negative for chest pain, irregular heart beats, exertional chest pressure/discomfort  Gastrointestinal: negative for dysphagia, nausea and vomiting  Genito-Urinary ROS:  see HPI  Inteument/breast: negative for rash, breast lump and nipple discharge  Musculoskeletal:negative for stiff joints, neck pain and muscle weakness  Endocrine ROS: negative for - breast changes, galactorrhea or temperature intolerance  Hematological and Lymphatic ROS: negative for - blood clots, bruising or swollen lymph nodes    Physical Exam:  Visit Vitals  /68   Wt 127 lb (57.6 kg)   Breastfeeding? No   BMI 24.80 kg/m²       GENERAL: alert, well appearing, and in no distress  HEAD: normocephalic, atraumatic.    PULM: clear to auscultation, no wheezes, rales or rhonchi, symmetric air entry   COR: normal rate and regular rhythm, S1 and S2 normal   ABDOMEN: soft, nontender, nondistended, no masses or organomegaly   EGBUS: no lesions, no inflammation, no masses, small ing LN right area < 1 cm, min tender  VULVA: normal appearing vulva with no masses, tenderness or lesions  VAGINA: normal appearing vagina with normal color, no lesions, white and thin discharge  CERVIX: normal appearing cervix without discharge or lesions, non tender  UTERUS: uterus is normal size, shape, consistency and nontender ADNEXA: normal adnexa in size, nontender and no masses  NEURO: alert, oriented, normal speech    Assessment:  Encounter Diagnoses   Name Primary?  Frequency of urination Yes    Vaginal discharge        Plan:  The patient is advised that she should contact the office if she does not note improvement or if symptoms recur  Recommend follow up with PCP for non-gynecologic complaints and chronic medical problems. She should contact our office with any questions or concerns  She could keep her routine annual exam appointment. We discussed potential causes of vaginal discharge/irritation. We discussed good vulvar hygiene. Recommended avoid vaginal irritants. Discussed use of mild soaps/detergents. Follow up if NI. We reviewed wet prep findings with the patient at her visit. Patient will be notified about swab results and prescription sent, if indicated.    UTI precautions  Hold on meds for now  Notify MD if NI, await results  Disc typical course of reactive LN, should resolve      Orders Placed This Encounter    CULTURE, URINE    NUSWAB VAGINITIS    AMB POC URINALYSIS DIP STICK MANUAL W/O MICRO    AMB POC WET PREP (AKA STAIN, INTERPRET, WET MOUNT)       Results for orders placed or performed in visit on 10/25/19   AMB POC URINALYSIS DIP STICK MANUAL W/O MICRO   Result Value Ref Range    Color (UA POC) Yellow     Clarity (UA POC) Clear     Glucose (UA POC) Negative Negative    Bilirubin (UA POC) Negative Negative    Ketones (UA POC) Negative Negative    Specific gravity (UA POC) 1.005 1.001 - 1.035    Blood (UA POC) Negative Negative    pH (UA POC) 6.0 4.6 - 8.0    Protein (UA POC) Negative Negative    Urobilinogen (UA POC) normal 0.2 - 1    Nitrites (UA POC) Negative Negative    Leukocyte esterase (UA POC) Trace Negative   AMB POC SMEAR, STAIN & INTERPRET, WET MOUNT   Result Value Ref Range    Wet mount (POC)      Narrative    ROSE MARIE    Hypae: negative  Buds: negative    Wet Prep:  Trich: negative  Clue cells: negative  Hyphae: negative  Buds: negative  WBC's: normal

## 2019-10-25 NOTE — PATIENT INSTRUCTIONS
Frequent Urination: Care Instructions  Your Care Instructions  An urge to urinate frequently but usually passing only small amounts of urine is a common symptom of urinary problems, such as urinary tract infections. The bladder may become inflamed. This can cause the urge to urinate. You may try to urinate more often than usual to try to soothe that urge. Frequent urination also may be caused by sexually transmitted infections (STIs) or kidney stones. Or it may happen when something irritates the tube that carries urine from the bladder to the outside of the body (urethra). It may also be a sign of diabetes. The cause may be hard to find. You may need tests. Follow-up care is a key part of your treatment and safety. Be sure to make and go to all appointments, and call your doctor if you are having problems. It's also a good idea to know your test results and keep a list of the medicines you take. How can you care for yourself at home? · Drink extra water for the next day or two. This will help make the urine less concentrated. (If you have kidney, heart, or liver disease and have to limit fluids, talk with your doctor before you increase the amount of fluids you drink.)  · Avoid drinks that are carbonated or have caffeine. They can irritate the bladder. For women:  · Urinate right after you have sex. · After you go to the bathroom, wipe from front to back. · Avoid douches, bubble baths, and feminine hygiene sprays. And avoid other feminine hygiene products that have deodorants. When should you call for help? Call your doctor now or seek immediate medical care if:    · You have new symptoms, such as fever, nausea, or vomiting.     · You have new or worse symptoms of a urinary problem. For example:  ? You have blood or pus in your urine. ? You have chills or body aches. ? It hurts to urinate. ? You have groin or belly pain. ? You have pain in your back just below your rib cage (the flank area).  Watch closely for changes in your health, and be sure to contact your doctor if you feel thirstier than usual.  Where can you learn more? Go to http://karolyn-brandin.info/. Enter 867 1254 in the search box to learn more about \"Frequent Urination: Care Instructions. \"  Current as of: December 19, 2018  Content Version: 12.2  © 1519-8569 [employer]. Care instructions adapted under license by [employer] (which disclaims liability or warranty for this information). If you have questions about a medical condition or this instruction, always ask your healthcare professional. [place] [address] any warranty or liability for your use of this information.

## 2019-10-27 LAB — BACTERIA UR CULT: NORMAL

## 2019-10-28 LAB
A VAGINAE DNA VAG QL NAA+PROBE: NORMAL SCORE
BVAB2 DNA VAG QL NAA+PROBE: NORMAL SCORE
C ALBICANS DNA VAG QL NAA+PROBE: NEGATIVE
C GLABRATA DNA VAG QL NAA+PROBE: NEGATIVE
MEGA1 DNA VAG QL NAA+PROBE: NORMAL SCORE
T VAGINALIS RRNA SPEC QL NAA+PROBE: NEGATIVE

## 2019-10-28 NOTE — PROGRESS NOTES
Patient aware of results and MD recommendations by randi.     Patient Result Comments     Viewed by Audrey Horner on 10/28/2019 12:19 PM   Written by Erika Davies MD on 10/27/2019  9:10 PM

## 2019-10-28 NOTE — PROGRESS NOTES
The results are normal, no clear evidence of vaginal infection  Please notify patient. Recommend f/u if still having symptoms/problems or has additional concerns.

## 2020-01-08 ENCOUNTER — OFFICE VISIT (OUTPATIENT)
Dept: FAMILY MEDICINE CLINIC | Age: 30
End: 2020-01-08

## 2020-01-08 VITALS
DIASTOLIC BLOOD PRESSURE: 74 MMHG | HEIGHT: 60 IN | SYSTOLIC BLOOD PRESSURE: 115 MMHG | RESPIRATION RATE: 16 BRPM | TEMPERATURE: 99 F | HEART RATE: 75 BPM | BODY MASS INDEX: 25.32 KG/M2 | OXYGEN SATURATION: 100 % | WEIGHT: 129 LBS

## 2020-01-08 DIAGNOSIS — Z13.220 SCREENING FOR CHOLESTEROL LEVEL: ICD-10-CM

## 2020-01-08 DIAGNOSIS — Z00.00 PHYSICAL EXAM: Primary | ICD-10-CM

## 2020-01-08 DIAGNOSIS — M79.7 FIBROMYALGIA: ICD-10-CM

## 2020-01-08 DIAGNOSIS — Z13.29 SCREENING FOR THYROID DISORDER: ICD-10-CM

## 2020-01-08 DIAGNOSIS — R10.9 ABDOMINAL PAIN, UNSPECIFIED ABDOMINAL LOCATION: ICD-10-CM

## 2020-01-08 DIAGNOSIS — Z00.00 PHYSICAL EXAM: ICD-10-CM

## 2020-01-08 RX ORDER — DOXYCYCLINE HYCLATE 100 MG
100 TABLET ORAL
COMMUNITY
Start: 2019-12-29 | End: 2021-01-08 | Stop reason: ALTCHOICE

## 2020-01-08 RX ORDER — AMITRIPTYLINE HYDROCHLORIDE 10 MG/1
10 TABLET, FILM COATED ORAL
Qty: 30 TAB | Refills: 1 | Status: SHIPPED | OUTPATIENT
Start: 2020-01-08 | End: 2020-02-11 | Stop reason: SDUPTHER

## 2020-01-08 RX ORDER — AMITRIPTYLINE HYDROCHLORIDE 25 MG/1
TABLET, FILM COATED ORAL
Qty: 90 TAB | Refills: 1 | Status: SHIPPED | OUTPATIENT
Start: 2020-01-08 | End: 2020-05-04

## 2020-01-08 NOTE — PROGRESS NOTES
Identified pt with two pt identifiers(name and ). Chief Complaint   Patient presents with    Fibromyalgia    Gallbladder Attack     reports she never had imaging done one year ago to check out her gallbladder and it is still bothering her so she would like to have imaging done    Medication Refill    Labs     NPO since midnight        Health Maintenance Due   Topic    Influenza Age 5 to Adult        Wt Readings from Last 3 Encounters:   20 129 lb (58.5 kg)   10/25/19 127 lb (57.6 kg)   19 118 lb (53.5 kg)     Temp Readings from Last 3 Encounters:   20 99 °F (37.2 °C) (Oral)   19 98.8 °F (37.1 °C) (Oral)   19 99.1 °F (37.3 °C) (Oral)     BP Readings from Last 3 Encounters:   20 115/74   10/25/19 116/68   19 102/78     Pulse Readings from Last 3 Encounters:   20 75   19 96   19 67         Learning Assessment:  :     Learning Assessment 10/16/2015   PRIMARY LEARNER Patient   BARRIERS PRIMARY LEARNER NONE   PRIMARY LANGUAGE ENGLISH   LEARNER PREFERENCE PRIMARY DEMONSTRATION   ANSWERED BY patient   RELATIONSHIP SELF       Depression Screening:  :     3 most recent PHQ Screens 2020   Little interest or pleasure in doing things Not at all   Feeling down, depressed, irritable, or hopeless Not at all   Total Score PHQ 2 0       Fall Risk Assessment:  :     No flowsheet data found. Abuse Screening:  :     Abuse Screening Questionnaire 2020   Do you ever feel afraid of your partner? N N   Are you in a relationship with someone who physically or mentally threatens you? N N   Is it safe for you to go home?  Y Y       Coordination of Care Questionnaire:  :     1) Have you been to an emergency room, urgent care clinic since your last visit? no   Hospitalized since your last visit? no             2) Have you seen or consulted any other health care providers outside of [address] [place] [address] since your last visit? no  (Include any pap smears or colon screenings in this section.)    3) Do you have an Advance Directive on file? no  Are you interested in receiving information about Advance Directives? no    Reviewed record in preparation for visit and have obtained necessary documentation. Medication reconciliation up to date and corrected with patient at this time.

## 2020-01-08 NOTE — PROGRESS NOTES
Subjective:   26 y.o. female for Well Woman Check. Her gyne and breast care is done elsewhere by her Ob-Gyne physician. Patient Active Problem List    Diagnosis Date Noted    H/O seasonal allergies     Fibromyalgia     Celiac disease      Current Outpatient Medications   Medication Sig Dispense Refill    amitriptyline (ELAVIL) 10 mg tablet Take 1 Tab by mouth daily as needed for Pain. 30 Tab 1    amitriptyline (ELAVIL) 25 mg tablet TAKE 1 TABLET BY MOUTH EVERY DAY AT NIGHT 90 Tab 1    Lactobacillus acidophilus (PROBIOTIC PO) Take  by mouth.  multivitamin (ONE A DAY) tablet Take 1 Tab by mouth daily.  norethindrone-e estradiol-iron (LOMEDIA 24 FE) 1 mg-20 mcg (24)/75 mg (4) tab Take 1 Tab by mouth daily. 3 Package 4    cetirizine (ZYRTEC) 10 mg tablet Take 10 mg by mouth daily.  doxycycline (VIBRA-TABS) 100 mg tablet Take 100 mg by mouth. BID x 10 days/tx started 12/29/19       Allergies   Allergen Reactions    Amoxicillin Hives    Gluten Unable to Obtain     Past Medical History:   Diagnosis Date    Anxiety     Asthma     exercise induced    Calculus of kidney     Celiac disease     Depression     Epilepsy ([facility] [address]     seizures when 16-13 yo, on medication for 2 years, undetermined cause, no problems since that time    Fibromyalgia     GERD (gastroesophageal reflux disease)     H/O seasonal allergies      Past Surgical History:   Procedure Laterality Date    HX GYN      HX TONSILLECTOMY       Family History   Problem Relation Age of Onset    Elevated Lipids Father     Hypertension Father     No Known Problems Mother      Social History     Tobacco Use    Smoking status: Never Smoker    Smokeless tobacco: Never Used   Substance Use Topics    Alcohol use:  Yes     Alcohol/week: 1.0 standard drinks     Types: 1 Standard drinks or equivalent per week     Comment: social        Lab Results   Component Value Date/Time    WBC 5.1 04/25/2018 10:10 AM    HGB 13.3 04/25/2018 10:10 AM HCT 39.0 04/25/2018 10:10 AM    PLATELET 919 69/34/9073 10:10 AM    MCV 92.9 04/25/2018 10:10 AM    Hgb, External 11.2 03/30/2017    Hct, External 33.2 03/30/2017    Platelet cnt., External 235 03/30/2017     Lab Results   Component Value Date/Time    Cholesterol, total 184 04/25/2018 10:10 AM    HDL Cholesterol 75 04/25/2018 10:10 AM    LDL-CHOLESTEROL 99 04/25/2018 10:10 AM    Triglyceride 35 04/25/2018 10:10 AM    Cholesterol/HDL ratio 2.5 04/25/2018 10:10 AM     Lab Results   Component Value Date/Time    GFR est non-AA 95 04/25/2018 10:10 AM    GFR est  04/25/2018 10:10 AM    Creatinine 0.83 04/25/2018 10:10 AM    BUN 16 04/25/2018 10:10 AM    Sodium 137 04/25/2018 10:10 AM    Potassium 4.1 04/25/2018 10:10 AM    Chloride 104 04/25/2018 10:10 AM    CO2 26 04/25/2018 10:10 AM        ROS: Feeling generally well. No TIA's or unusual headaches, no dysphagia. No prolonged cough. No dyspnea or chest pain on exertion. No abdominal pain, change in bowel habits, black or bloody stools. No urinary tract symptoms. No new or unusual musculoskeletal symptoms. Specific concerns today: Pt presents today for physical with fasting labs. She continues to have abdominal pain in the area of her gallbladder. She was ordered an US of this area last year when she presented with same symptoms, but never went through with getting US. She would like to get this at this time. In addition, she has started having breakthrough fibromyalgia pain, throughout the day. The 25 mg Amytriptiline has been beneficial, but it makes her fatigued, so she is not able to take during the day. She was previously on a 10 mg tab for daytime pain, and is requesting refill of this. Objective: The patient appears well, alert, oriented x 3, in no distress.   Visit Vitals  /74 (BP 1 Location: Left arm, BP Patient Position: Sitting)   Pulse 75   Temp 99 °F (37.2 °C) (Oral)   Resp 16   Ht 5' (1.524 m)   Wt 129 lb (58.5 kg)   SpO2 100%   BMI 25.19 kg/m²     ENT normal.  Neck supple. No adenopathy or thyromegaly. FORD. Lungs are clear, good air entry, no wheezes, rhonchi or rales. S1 and S2 normal, no murmurs, regular rate and rhythm. Abdomen soft without tenderness, guarding, mass or organomegaly. Extremities show no edema, normal peripheral pulses. Neurological is normal, no focal findings. Breast and Pelvic exams are deferred. Assessment/Plan:   Well Woman  routine labs ordered, call if any problems    ICD-10-CM ICD-9-CM    1. Physical exam Z00.00 V70.9 CBC W/O DIFF      METABOLIC PANEL, COMPREHENSIVE      LIPID PANEL      THYROID CASCADE PROFILE   2. Abdominal pain, unspecified abdominal location R10.9 789.00 US ABD LTD   3. Fibromyalgia M79.7 729.1 amitriptyline (ELAVIL) 10 mg tablet      amitriptyline (ELAVIL) 25 mg tablet   4. Screening for cholesterol level Z13.220 V77.91 LIPID PANEL   5. Screening for thyroid disorder Z13.29 V77.0 THYROID CASCADE PROFILE     Will notify when labs return  Educated about taking medication as prescribed  Will notify when [address] returns    Pt informed to return to office with worsening of symptoms, or PRN with any questions or concerns. Pt verbalizes understanding of plan of care and denies further questions or concerns at this time.

## 2020-01-08 NOTE — PATIENT INSTRUCTIONS

## 2020-01-09 LAB
ALBUMIN SERPL-MCNC: 4.6 G/DL (ref 3.5–5.5)
ALBUMIN/GLOB SERPL: 2.1 {RATIO} (ref 1.2–2.2)
ALP SERPL-CCNC: 41 IU/L (ref 39–117)
ALT SERPL-CCNC: 11 IU/L (ref 0–32)
AST SERPL-CCNC: 18 IU/L (ref 0–40)
BILIRUB SERPL-MCNC: 0.3 MG/DL (ref 0–1.2)
BUN SERPL-MCNC: 14 MG/DL (ref 6–20)
BUN/CREAT SERPL: 18 (ref 9–23)
CALCIUM SERPL-MCNC: 9.2 MG/DL (ref 8.7–10.2)
CHLORIDE SERPL-SCNC: 104 MMOL/L (ref 96–106)
CHOLEST SERPL-MCNC: 229 MG/DL (ref 100–199)
CO2 SERPL-SCNC: 23 MMOL/L (ref 20–29)
CREAT SERPL-MCNC: 0.79 MG/DL (ref 0.57–1)
ERYTHROCYTE [DISTWIDTH] IN BLOOD BY AUTOMATED COUNT: 12 % (ref 11.7–15.4)
GLOBULIN SER CALC-MCNC: 2.2 G/DL (ref 1.5–4.5)
GLUCOSE SERPL-MCNC: 83 MG/DL (ref 65–99)
HCT VFR BLD AUTO: 42.7 % (ref 34–46.6)
HDLC SERPL-MCNC: 82 MG/DL
HGB BLD-MCNC: 14.1 G/DL (ref 11.1–15.9)
INTERPRETATION, 910389: NORMAL
LDLC SERPL CALC-MCNC: 127 MG/DL (ref 0–99)
MCH RBC QN AUTO: 32.5 PG (ref 26.6–33)
MCHC RBC AUTO-ENTMCNC: 33 G/DL (ref 31.5–35.7)
MCV RBC AUTO: 98 FL (ref 79–97)
PLATELET # BLD AUTO: 284 X10E3/UL (ref 150–450)
POTASSIUM SERPL-SCNC: 4.9 MMOL/L (ref 3.5–5.2)
PROT SERPL-MCNC: 6.8 G/DL (ref 6–8.5)
RBC # BLD AUTO: 4.34 X10E6/UL (ref 3.77–5.28)
SODIUM SERPL-SCNC: 139 MMOL/L (ref 134–144)
TRIGL SERPL-MCNC: 102 MG/DL (ref 0–149)
TSH SERPL DL<=0.005 MIU/L-ACNC: 1.38 UIU/ML (ref 0.45–4.5)
VLDLC SERPL CALC-MCNC: 20 MG/DL (ref 5–40)
WBC # BLD AUTO: 7.3 X10E3/UL (ref 3.4–10.8)

## 2020-01-09 NOTE — PROGRESS NOTES
Please call patient and let her know that her labs returned:  1. Cholesterol is elevated.  Needs to focus on mediterranean diet and should re-check in 1 year  Thanks

## 2020-01-13 ENCOUNTER — HOSPITAL ENCOUNTER (OUTPATIENT)
Dept: ULTRASOUND IMAGING | Age: 30
Discharge: HOME | End: 2020-01-13
Attending: NURSE PRACTITIONER
Payer: COMMERCIAL

## 2020-01-13 DIAGNOSIS — R10.9 ABDOMINAL PAIN, UNSPECIFIED ABDOMINAL LOCATION: ICD-10-CM

## 2020-01-13 PROCEDURE — 76705 ECHO EXAM OF ABDOMEN: CPT

## 2020-01-13 NOTE — PROGRESS NOTES
Please call patient and let her know that her [address] returned and is normal.  Gallbladder appears normal  Thanks

## 2020-01-22 NOTE — TELEPHONE ENCOUNTER
26year old patient last seen in the office on 11/20/18 and has next appointment on 2/12/2020    Prescription refill sent as per MD order to get patient to her scheduled appointment. My chart message sent to patient.

## 2020-02-11 DIAGNOSIS — M79.7 FIBROMYALGIA: ICD-10-CM

## 2020-02-11 RX ORDER — AMITRIPTYLINE HYDROCHLORIDE 10 MG/1
10 TABLET, FILM COATED ORAL
Qty: 90 TAB | Refills: 1 | Status: SHIPPED | OUTPATIENT
Start: 2020-02-11 | End: 2020-08-06

## 2020-02-24 ENCOUNTER — OFFICE VISIT (OUTPATIENT)
Dept: OBGYN CLINIC | Age: 30
End: 2020-02-24

## 2020-02-24 VITALS
DIASTOLIC BLOOD PRESSURE: 80 MMHG | HEIGHT: 60 IN | WEIGHT: 130 LBS | SYSTOLIC BLOOD PRESSURE: 123 MMHG | BODY MASS INDEX: 25.52 KG/M2

## 2020-02-24 DIAGNOSIS — Z01.419 ENCOUNTER FOR GYNECOLOGICAL EXAMINATION (GENERAL) (ROUTINE) WITHOUT ABNORMAL FINDINGS: Primary | ICD-10-CM

## 2020-02-24 NOTE — PATIENT INSTRUCTIONS
Well Visit, Ages 25 to 48: Care Instructions  Your Care Instructions    Physical exams can help you stay healthy. Your doctor has checked your overall health and may have suggested ways to take good care of yourself. He or she also may have recommended tests. At home, you can help prevent illness with healthy eating, regular exercise, and other steps. Follow-up care is a key part of your treatment and safety. Be sure to make and go to all appointments, and call your doctor if you are having problems. It's also a good idea to know your test results and keep a list of the medicines you take. How can you care for yourself at home? · Reach and stay at a healthy weight. This will lower your risk for many problems, such as obesity, diabetes, heart disease, and high blood pressure. · Get at least 30 minutes of physical activity on most days of the week. Walking is a good choice. You also may want to do other activities, such as running, swimming, cycling, or playing tennis or team sports. Discuss any changes in your exercise program with your doctor. · Do not smoke or allow others to smoke around you. If you need help quitting, talk to your doctor about stop-smoking programs and medicines. These can increase your chances of quitting for good. · Talk to your doctor about whether you have any risk factors for sexually transmitted infections (STIs). Having one sex partner (who does not have STIs and does not have sex with anyone else) is a good way to avoid these infections. · Use birth control if you do not want to have children at this time. Talk with your doctor about the choices available and what might be best for you. · Protect your skin from too much sun. When you're outdoors from 10 a.m. to 4 p.m., stay in the shade or cover up with clothing and a hat with a wide brim. Wear sunglasses that block UV rays. Even when it's cloudy, put broad-spectrum sunscreen (SPF 30 or higher) on any exposed skin.   · See a dentist one or two times a year for checkups and to have your teeth cleaned. · Wear a seat belt in the car. Follow your doctor's advice about when to have certain tests. These tests can spot problems early. For everyone  · Cholesterol. Have the fat (cholesterol) in your blood tested after age 21. Your doctor will tell you how often to have this done based on your age, family history, or other things that can increase your risk for heart disease. · Blood pressure. Have your blood pressure checked during a routine doctor visit. Your doctor will tell you how often to check your blood pressure based on your age, your blood pressure results, and other factors. · Vision. Talk with your doctor about how often to have a glaucoma test.  · Diabetes. Ask your doctor whether you should have tests for diabetes. · Colon cancer. Your risk for colorectal cancer gets higher as you get older. Some experts say that adults should start regular screening at age 48 and stop at age 76. Others say to start before age 48 or continue after age 76. Talk with your doctor about your risk and when to start and stop screening. For women  · Breast exam and mammogram. Talk to your doctor about when you should have a clinical breast exam and a mammogram. Medical experts differ on whether and how often women under 50 should have these tests. Your doctor can help you decide what is right for you. · Cervical cancer screening test and pelvic exam. Begin with a Pap test at age 24. The test often is part of a pelvic exam. Starting at age 27, you may choose to have a Pap test, an HPV test, or both tests at the same time (called co-testing). Talk with your doctor about how often to have testing. · Tests for sexually transmitted infections (STIs). Ask whether you should have tests for STIs. You may be at risk if you have sex with more than one person, especially if your partners do not wear condoms.   For men  · Tests for sexually transmitted infections (STIs). Ask whether you should have tests for STIs. You may be at risk if you have sex with more than one person, especially if you do not wear a condom. · Testicular cancer exam. Ask your doctor whether you should check your testicles regularly. · Prostate exam. Talk to your doctor about whether you should have a blood test (called a PSA test) for prostate cancer. Experts differ on whether and when men should have this test. Some experts suggest it if you are older than 39 and are -American or have a father or brother who got prostate cancer when he was younger than 72. When should you call for help? Watch closely for changes in your health, and be sure to contact your doctor if you have any problems or symptoms that concern you. Where can you learn more? Go to http://karolyn-brandin.info/. Enter P072 in the search box to learn more about \"Well Visit, Ages 25 to 48: Care Instructions. \"  Current as of: December 13, 2018  Content Version: 12.2  © 0153-7912 [employer]. Care instructions adapted under license by [employer] (which disclaims liability or warranty for this information). If you have questions about a medical condition or this instruction, always ask your healthcare professional. [place] [address] any warranty or liability for your use of this information.

## 2020-03-04 DIAGNOSIS — K21.9 GASTROESOPHAGEAL REFLUX DISEASE, ESOPHAGITIS PRESENCE NOT SPECIFIED: Primary | ICD-10-CM

## 2020-05-04 DIAGNOSIS — M79.7 FIBROMYALGIA: ICD-10-CM

## 2020-05-04 RX ORDER — AMITRIPTYLINE HYDROCHLORIDE 25 MG/1
TABLET, FILM COATED ORAL
Qty: 90 TAB | Refills: 1 | Status: SHIPPED | OUTPATIENT
Start: 2020-05-04 | End: 2020-11-09

## 2020-08-06 DIAGNOSIS — M79.7 FIBROMYALGIA: ICD-10-CM

## 2020-08-06 RX ORDER — AMITRIPTYLINE HYDROCHLORIDE 10 MG/1
TABLET, FILM COATED ORAL
Qty: 90 TAB | Refills: 1 | Status: SHIPPED | OUTPATIENT
Start: 2020-08-06 | End: 2021-10-06

## 2020-11-07 DIAGNOSIS — M79.7 FIBROMYALGIA: ICD-10-CM

## 2020-11-09 RX ORDER — AMITRIPTYLINE HYDROCHLORIDE 25 MG/1
TABLET, FILM COATED ORAL
Qty: 90 TAB | Refills: 1 | Status: SHIPPED | OUTPATIENT
Start: 2020-11-09 | End: 2021-05-16

## 2021-01-08 ENCOUNTER — OFFICE VISIT (OUTPATIENT)
Dept: FAMILY MEDICINE CLINIC | Age: 31
End: 2021-01-08
Payer: COMMERCIAL

## 2021-01-08 VITALS
BODY MASS INDEX: 26.7 KG/M2 | RESPIRATION RATE: 16 BRPM | OXYGEN SATURATION: 100 % | DIASTOLIC BLOOD PRESSURE: 84 MMHG | SYSTOLIC BLOOD PRESSURE: 128 MMHG | WEIGHT: 136 LBS | HEART RATE: 83 BPM | TEMPERATURE: 99.4 F | HEIGHT: 60 IN

## 2021-01-08 DIAGNOSIS — F41.9 ANXIETY AND DEPRESSION: ICD-10-CM

## 2021-01-08 DIAGNOSIS — Z00.00 PHYSICAL EXAM: Primary | ICD-10-CM

## 2021-01-08 DIAGNOSIS — F32.A ANXIETY AND DEPRESSION: ICD-10-CM

## 2021-01-08 DIAGNOSIS — Z13.29 SCREENING FOR THYROID DISORDER: ICD-10-CM

## 2021-01-08 DIAGNOSIS — Z13.220 SCREENING FOR CHOLESTEROL LEVEL: ICD-10-CM

## 2021-01-08 PROCEDURE — 99395 PREV VISIT EST AGE 18-39: CPT | Performed by: NURSE PRACTITIONER

## 2021-01-08 RX ORDER — ESCITALOPRAM OXALATE 10 MG/1
10 TABLET ORAL DAILY
Qty: 30 TAB | Refills: 2 | Status: SHIPPED | OUTPATIENT
Start: 2021-01-08 | End: 2021-03-15 | Stop reason: SDUPTHER

## 2021-01-08 NOTE — PROGRESS NOTES
Subjective:   33 y.o. female for Well Woman Check. Her gyne and breast care is done elsewhere by her Ob-Gyne physician. Patient Active Problem List    Diagnosis Date Noted    H/O seasonal allergies     Fibromyalgia     Celiac disease      Current Outpatient Medications   Medication Sig Dispense Refill    escitalopram oxalate (LEXAPRO) 10 mg tablet Take 1 Tab by mouth daily. 30 Tab 2    amitriptyline (ELAVIL) 25 mg tablet TAKE 1 TABLET BY MOUTH EVERY DAY EVERY NIGHT 90 Tab 1    amitriptyline (ELAVIL) 10 mg tablet TAKE 1 TABLET BY MOUTH DAILY AS NEEDED FOR PAIN (Patient taking differently: Take 10 mg by mouth daily as needed. For breakthrough pain) 90 Tab 1    norethindrone-e estradiol-iron (LOMEDIA 24 FE) 1 mg-20 mcg (24)/75 mg (4) tab Take 1 Tab by mouth daily. 3 Package 4    Lactobacillus acidophilus (PROBIOTIC PO) Take 1 Cap by mouth daily.  multivitamin (ONE A DAY) tablet Take 1 Tab by mouth daily.  cetirizine (ZYRTEC) 10 mg tablet Take 10 mg by mouth daily. Allergies   Allergen Reactions    Amoxicillin Hives    Gluten Unable to Obtain     Past Medical History:   Diagnosis Date    Anxiety     Asthma     exercise induced    Calculus of kidney     Celiac disease     Depression     Epilepsy ([facility] [address]     seizures when 16-13 yo, on medication for 2 years, undetermined cause, no problems since that time    Fibromyalgia     GERD (gastroesophageal reflux disease)     H/O seasonal allergies      Past Surgical History:   Procedure Laterality Date    HX GYN      HX TONSILLECTOMY       Family History   Problem Relation Age of Onset    Elevated Lipids Father     Hypertension Father     No Known Problems Mother      Social History     Tobacco Use    Smoking status: Never Smoker    Smokeless tobacco: Never Used   Substance Use Topics    Alcohol use:  Yes     Alcohol/week: 1.0 standard drinks     Types: 1 Standard drinks or equivalent per week     Comment: social        Lab Results Component Value Date/Time    WBC 7.3 01/08/2020 08:29 AM    HGB 14.1 01/08/2020 08:29 AM    HCT 42.7 01/08/2020 08:29 AM    PLATELET 416 34/76/3990 08:29 AM    MCV 98 (H) 01/08/2020 08:29 AM    Hgb, External 11.2 03/30/2017    Hct, External 33.2 03/30/2017    Platelet cnt., External 235 03/30/2017     Lab Results   Component Value Date/Time    Cholesterol, total 229 (H) 01/08/2020 08:29 AM    HDL Cholesterol 82 01/08/2020 08:29 AM    LDL-CHOLESTEROL 99 04/25/2018 10:10 AM    LDL, calculated 127 (H) 01/08/2020 08:29 AM    Triglyceride 102 01/08/2020 08:29 AM    Cholesterol/HDL ratio 2.5 04/25/2018 10:10 AM     Lab Results   Component Value Date/Time    GFR est non-AA 99 01/08/2020 08:29 AM    GFR est  01/08/2020 08:29 AM    Creatinine 0.79 01/08/2020 08:29 AM    BUN 14 01/08/2020 08:29 AM    Sodium 139 01/08/2020 08:29 AM    Potassium 4.9 01/08/2020 08:29 AM    Chloride 104 01/08/2020 08:29 AM    CO2 23 01/08/2020 08:29 AM        ROS: Feeling generally well. No TIA's or unusual headaches, no dysphagia. No prolonged cough. No dyspnea or chest pain on exertion. No abdominal pain, change in bowel habits, black or bloody stools. No urinary tract symptoms. No new or unusual musculoskeletal symptoms. Specific concerns today: Pt states that she has been dealing with anxiety and depression for a little over a year now. The depression seems to be worsening recently, and she believes that she may need some medication help in that area. She states that she has been treated for depression about 8 years ago, and was on Lexapro and this worked well for her. She denies suicidal and/or homicidal ideations. Objective: The patient appears well, alert, oriented x 3, in no distress.   Visit Vitals  /84 (BP 1 Location: Left arm, BP Patient Position: Sitting)   Pulse 83   Temp 99.4 °F (37.4 °C) (Oral)   Resp 16   Ht 5' (1.524 m)   Wt 136 lb (61.7 kg)   SpO2 100%   Breastfeeding No   BMI 26.56 kg/m²     ENT normal.  Neck supple. No adenopathy or thyromegaly. FORD. Lungs are clear, good air entry, no wheezes, rhonchi or rales. S1 and S2 normal, no murmurs, regular rate and rhythm. Abdomen soft without tenderness, guarding, mass or organomegaly. Extremities show no edema, normal peripheral pulses. Neurological is normal, no focal findings. Breast and Pelvic exams are deferred. Assessment/Plan:   Well Woman  continue present plan, routine labs ordered    ICD-10-CM ICD-9-CM    1. Physical exam  Z00.00 V70.9 CBC W/O DIFF      METABOLIC PANEL, COMPREHENSIVE      LIPID PANEL      THYROID CASCADE PROFILE   2. Screening for cholesterol level  Z13.220 V77.91 LIPID PANEL   3. Screening for thyroid disorder  Z13.29 V77.0 THYROID CASCADE PROFILE   4. Anxiety and depression  F41.9 300.00 escitalopram oxalate (LEXAPRO) 10 mg tablet    F32.9 311      Will notify when labs return    Educated about taking Lexapro as prescribed  Educated about how this medication works and common side effects  Should ALWAYS call 911 or go to the ER with ANY suicidal and/or homicidal ideations  Should notify office of any negative side effects or concerns    Pt informed to return to office with worsening of symptoms, or PRN with any questions or concerns. Pt verbalizes understanding of plan of care and denies further questions or concerns at this time.

## 2021-01-08 NOTE — PATIENT INSTRUCTIONS
Depression Treatment: Care Instructions  Your Care Instructions     Depression is a condition that affects the way you feel, think, and act. It causes symptoms such as low energy, loss of interest in daily activities, and sadness or grouchiness that goes on for a long time. Depression is very common and affects men and women of all ages. Depression is a medical illness caused by changes in the natural chemicals in your brain. It is not a character flaw, and it does not mean that you are a bad or weak person. It does not mean that you are going crazy. It is important to know that depression can be treated. Medicines, counseling, and self-care can all help. Many people do not get help because they are embarrassed or think that they will get over the depression on their own. But some people do not get better without treatment. Follow-up care is a key part of your treatment and safety. Be sure to make and go to all appointments, and call your doctor if you are having problems. It's also a good idea to know your test results and keep a list of the medicines you take. How can you care for yourself at home? Learn about antidepressant medicines  Antidepressant medicines can improve or end the symptoms of depression. You may need to take the medicine for at least 6 months, and often longer. Keep taking your medicine even if you feel better. If you stop taking it too soon, your symptoms may come back or get worse. You may start to feel better within 1 to 3 weeks of taking antidepressant medicine. But it can take as many as 6 to 8 weeks to see more improvement. Talk to your doctor if you have problems with your medicine or if you do not notice any improvement after 3 weeks. Antidepressants can make you feel tired, dizzy, or nervous. Some people have dry mouth, constipation, headaches, sexual problems, an upset stomach, or diarrhea.  Many of these side effects are mild and go away on their own after you take the medicine for a few weeks. Some may last longer. Talk to your doctor if side effects bother you too much. You might be able to try a different medicine. If you are pregnant or breastfeeding, talk to your doctor about what medicines you can take. Learn about counseling  In many cases, counseling can work as well as medicines to treat mild to moderate depression. Counseling is done by licensed mental health providers, such as psychologists, social workers, and some types of nurses. It can be done in one-on-one sessions or in a group setting. Many people find group sessions helpful. Cognitive-behavioral therapy is a type of counseling. In this treatment therapy, you learn how to see and change unhelpful thinking styles that may be adding to your depression. Counseling and medicines often work well when used together. Here are other things you could try to help with depression:  · Get regular exercise. It may help you feel better. · Plan something pleasant for yourself every day. Include activities that you have enjoyed in the past.  · Get enough sleep. Talk to your doctor if you have problems sleeping. · Eat a balanced diet. If you do not feel hungry, eat small snacks rather than large meals. · Avoid using illegal drugs or marijuana and drinking alcohol. Do not take medicines that have not been prescribed for you. They may interfere with your treatment, or they may make your depression worse. · Spend time with family and friends. It may help to speak openly about your depression with people you trust.  · Take your medicines exactly as prescribed. Call your doctor if you think you are having a problem with your medicine. · Do not make major life decisions while you are depressed. Depression may change the way you think. You will be able to make better decisions after you feel better. · Think positively. Challenge negative thoughts with statements such as \"I am hopeful\";  \"Things will get better\"; and \"I can ask for the help I need. \" Write down these statements and read them often, even if you don't believe them yet. · Be patient with yourself. It took time for your depression to develop, and it will take time for your symptoms to improve. Do not take on too much or be too hard on yourself. · Learn all you can about depression from written and online materials. · Check out behavioral health classes to learn more about dealing with depression. · If you or someone you know talks about suicide, self-harm, or feeling hopeless, get help right away. Call the [address] [place] [address] at 2-626-205-VDZI ([phone]) or text HOME to 197961 to access the Stop Being Watched Text Line. Consider saving these numbers in your phone. When should you call for help? Call 911 anytime you think you may need emergency care. For example, call if:    · You feel you cannot stop from hurting yourself or someone else. Call your doctor now or seek immediate medical care if:    · You hear voices.     · You feel much more depressed. Watch closely for changes in your health, and be sure to contact your doctor if:    · You are having problems with your depression medicine.     · You are not getting better as expected. Where can you learn more? Go to http://www.gray.com/  Enter G693 in the search box to learn more about \"Depression Treatment: Care Instructions. \"  Current as of: January 31, 2020               Content Version: 12.6  © 3106-0470 [employer]. Care instructions adapted under license by [employer] (which disclaims liability or warranty for this information). If you have questions about a medical condition or this instruction, always ask your healthcare professional. [place] [address] any warranty or liability for your use of this information.

## 2021-01-08 NOTE — PROGRESS NOTES
Identified pt with two pt identifiers(name and ). Chief Complaint   Patient presents with    Depression     \"would like to discuss my mood and how I have been feeling lately and discuss if I may need medication to help with it\"    Labs     NPO since midnight - Fasting labs        Health Maintenance Due   Topic    Flu Vaccine (1)       Wt Readings from Last 3 Encounters:   21 136 lb (61.7 kg)   20 130 lb (59 kg)   20 129 lb (58.5 kg)     Temp Readings from Last 3 Encounters:   21 99.4 °F (37.4 °C) (Oral)   20 99 °F (37.2 °C) (Oral)   19 98.8 °F (37.1 °C) (Oral)     BP Readings from Last 3 Encounters:   21 128/84   20 123/80   20 115/74     Pulse Readings from Last 3 Encounters:   21 83   20 75   19 96         Learning Assessment:  :     Learning Assessment 10/16/2015   PRIMARY LEARNER Patient   BARRIERS PRIMARY LEARNER NONE   PRIMARY LANGUAGE ENGLISH   LEARNER PREFERENCE PRIMARY DEMONSTRATION   ANSWERED BY patient   RELATIONSHIP SELF       Depression Screening:  :     3 most recent PHQ Screens 2021   Little interest or pleasure in doing things Not at all   Feeling down, depressed, irritable, or hopeless Not at all   Total Score PHQ 2 0       Fall Risk Assessment:  :     No flowsheet data found. Abuse Screening:  :     Abuse Screening Questionnaire 2021   Do you ever feel afraid of your partner? N N N   Are you in a relationship with someone who physically or mentally threatens you? N N N   Is it safe for you to go home?  Y Y Y       Coordination of Care Questionnaire:  :     1) Have you been to an emergency room, urgent care clinic since your last visit? no   Hospitalized since your last visit? no             2) Have you seen or consulted any other health care providers outside of [address] [place] [address] since your last visit? no  (Include any pap smears or colon screenings in this section.)    3) Do you have an Advance Directive on file? no  Are you interested in receiving information about Advance Directives? no    Patient is accompanied by self. Reviewed record in preparation for visit and have obtained necessary documentation. Medication reconciliation up to date and corrected with patient at this time.

## 2021-01-09 LAB
ALBUMIN SERPL-MCNC: 4.2 G/DL (ref 3.5–5)
ALBUMIN/GLOB SERPL: 1.4 {RATIO} (ref 1.1–2.2)
ALP SERPL-CCNC: 44 U/L (ref 45–117)
ALT SERPL-CCNC: 17 U/L (ref 12–78)
ANION GAP SERPL CALC-SCNC: 5 MMOL/L (ref 5–15)
AST SERPL-CCNC: 11 U/L (ref 15–37)
BILIRUB SERPL-MCNC: 0.6 MG/DL (ref 0.2–1)
BUN SERPL-MCNC: 21 MG/DL (ref 6–20)
BUN/CREAT SERPL: 24 (ref 12–20)
CALCIUM SERPL-MCNC: 9.4 MG/DL (ref 8.5–10.1)
CHLORIDE SERPL-SCNC: 106 MMOL/L (ref 97–108)
CHOLEST SERPL-MCNC: 233 MG/DL
CO2 SERPL-SCNC: 27 MMOL/L (ref 21–32)
CREAT SERPL-MCNC: 0.87 MG/DL (ref 0.55–1.02)
ERYTHROCYTE [DISTWIDTH] IN BLOOD BY AUTOMATED COUNT: 12.2 % (ref 11.5–14.5)
GLOBULIN SER CALC-MCNC: 3 G/DL (ref 2–4)
GLUCOSE SERPL-MCNC: 90 MG/DL (ref 65–100)
HCT VFR BLD AUTO: 38.2 % (ref 35–47)
HDLC SERPL-MCNC: 83 MG/DL
HDLC SERPL: 2.8 {RATIO} (ref 0–5)
HGB BLD-MCNC: 12.7 G/DL (ref 11.5–16)
LDLC SERPL CALC-MCNC: 135 MG/DL (ref 0–100)
LIPID PROFILE,FLP: ABNORMAL
MCH RBC QN AUTO: 31.6 PG (ref 26–34)
MCHC RBC AUTO-ENTMCNC: 33.2 G/DL (ref 30–36.5)
MCV RBC AUTO: 95 FL (ref 80–99)
NRBC # BLD: 0 K/UL (ref 0–0.01)
NRBC BLD-RTO: 0 PER 100 WBC
PLATELET # BLD AUTO: 236 K/UL (ref 150–400)
PMV BLD AUTO: 9.6 FL (ref 8.9–12.9)
POTASSIUM SERPL-SCNC: 4.1 MMOL/L (ref 3.5–5.1)
PROT SERPL-MCNC: 7.2 G/DL (ref 6.4–8.2)
RBC # BLD AUTO: 4.02 M/UL (ref 3.8–5.2)
SODIUM SERPL-SCNC: 138 MMOL/L (ref 136–145)
TRIGL SERPL-MCNC: 75 MG/DL (ref ?–150)
TSH SERPL-ACNC: 1.38 UIU/ML (ref 0.45–4.5)
VLDLC SERPL CALC-MCNC: 15 MG/DL
WBC # BLD AUTO: 4.6 K/UL (ref 3.6–11)

## 2021-01-11 NOTE — PROGRESS NOTES
Please call patient and let her know that her labs returned:  1. Cholesterol is elevated, and was in the past.  Is she willing to start a low dose statin therapy? Should focus on mediterranean diet. Let me know  Thanks!

## 2021-01-12 RX ORDER — ATORVASTATIN CALCIUM 10 MG/1
10 TABLET, FILM COATED ORAL DAILY
Qty: 90 TAB | Refills: 1 | Status: SHIPPED | OUTPATIENT
Start: 2021-01-12 | End: 2021-07-20

## 2021-03-02 NOTE — PROGRESS NOTES
Kendra Bond is a ,  33 y.o. female WHITE whose Patient's last menstrual period was 2021. was on 2021 who presents for her annual checkup. She is having no significant problems. With regard to the Gardisil vaccine, she is older than the FDA approved age to receive it. Menstrual status:    Her periods are light, moderate in flow. She is using three to five pads or tampons per day, usually regular and last 26-30 days. She denies dysmenorrhea. She reports no premenstrual symptoms. Contraception:    The current method of family planning is OCP (estrogen/progesterone) and She declines contraception and counseling. Sexual history:    She  reports being sexually active and has had partner(s) who are Male. She reports using the following method of birth control/protection: Pill. Medical conditions:    Since her last annual GYN exam about one year ago, she has not the following changes in her health history: none. Pap and Mammogram History:    Her most recent Pap smear was 2018 normal/HPV neg    The patient has never had a mammogram.    The patient does not have a family history of breast cancer. Past Medical History:   Diagnosis Date    Anxiety     Asthma     exercise induced    Calculus of kidney     Celiac disease     Depression     Epilepsy ([facility] [address]     seizures when 16-13 yo, on medication for 2 years, undetermined cause, no problems since that time    Fibromyalgia     GERD (gastroesophageal reflux disease)     H/O seasonal allergies      Past Surgical History:   Procedure Laterality Date    HX GYN      HX TONSILLECTOMY         Current Outpatient Medications   Medication Sig Dispense Refill    atorvastatin (LIPITOR) 10 mg tablet Take 1 Tab by mouth daily. 90 Tab 1    escitalopram oxalate (LEXAPRO) 10 mg tablet Take 1 Tab by mouth daily.  30 Tab 2    norethindrone-e estradiol-iron (LOMEDIA 24 FE) 1 mg-20 mcg (24)/75 mg (4) tab Take 1 Tab by mouth daily. 3 Package 4    Lactobacillus acidophilus (PROBIOTIC PO) Take 1 Cap by mouth daily.  multivitamin (ONE A DAY) tablet Take 1 Tab by mouth daily.  cetirizine (ZYRTEC) 10 mg tablet Take 10 mg by mouth daily.  amitriptyline (ELAVIL) 25 mg tablet TAKE 1 TABLET BY MOUTH EVERY DAY EVERY NIGHT 90 Tab 1    amitriptyline (ELAVIL) 10 mg tablet TAKE 1 TABLET BY MOUTH DAILY AS NEEDED FOR PAIN (Patient taking differently: Take 10 mg by mouth daily as needed. For breakthrough pain) 90 Tab 1     Allergies: Amoxicillin and Gluten   Social History     Socioeconomic History    Marital status:      Spouse name: Not on file    Number of children: Not on file    Years of education: Not on file    Highest education level: Not on file   Occupational History    Not on file   Social Needs    Financial resource strain: Not on file    Food insecurity     Worry: Not on file     Inability: Not on file    Transportation needs     Medical: Not on file     Non-medical: Not on file   Tobacco Use    Smoking status: Never Smoker    Smokeless tobacco: Never Used   Substance and Sexual Activity    Alcohol use:  Yes     Alcohol/week: 1.0 standard drinks     Types: 1 Standard drinks or equivalent per week     Comment: social    Drug use: No    Sexual activity: Yes     Partners: Male     Birth control/protection: Pill   Lifestyle    Physical activity     Days per week: Not on file     Minutes per session: Not on file    Stress: Not on file   Relationships    Social connections     Talks on phone: Not on file     Gets together: Not on file     Attends Judaism service: Not on file     Active member of club or organization: Not on file     Attends meetings of clubs or organizations: Not on file     Relationship status: Not on file    Intimate partner violence     Fear of current or ex partner: Not on file     Emotionally abused: Not on file     Physically abused: Not on file     Forced sexual activity: Not on file   Other Topics Concern    Not on file   Social History Narrative    Not on file     Tobacco History:  reports that she has never smoked. She has never used smokeless tobacco.  Alcohol Abuse:  reports current alcohol use of about 1.0 standard drinks of alcohol per week. Drug Abuse:  reports no history of drug use.     Patient Active Problem List   Diagnosis Code    H/O seasonal allergies Z88.9    Fibromyalgia M79.7    Celiac disease K90.0       Review of Systems - History obtained from the patient  Constitutional: negative for weight loss, fever, night sweats  HEENT: negative for hearing loss, earache, congestion, snoring, sorethroat  CV: negative for chest pain, palpitations, edema  Resp: negative for cough, shortness of breath, wheezing  GI: negative for change in bowel habits, abdominal pain, black or bloody stools  : negative for frequency, dysuria, hematuria, vaginal discharge  MSK: negative for back pain, joint pain, muscle pain  Breast: negative for breast lumps, nipple discharge, galactorrhea  Skin :negative for itching, rash, hives  Neuro: negative for dizziness, headache, confusion, weakness  Psych: negative for anxiety, depression, change in mood  Heme/lymph: negative for bleeding, bruising, pallor    Physical Exam    Visit Vitals  /82   Ht 5' (1.524 m)   Wt 140 lb 12.8 oz (63.9 kg)   LMP 02/27/2021   BMI 27.50 kg/m²       Constitutional  · Appearance: well-nourished, well developed, alert, in no acute distress    HENT  · Head and Face: appears normal    Neck  · Inspection/Palpation: normal appearance, no masses or tenderness  · Lymph Nodes: no lymphadenopathy present  · Thyroid: gland size normal, nontender, no nodules or masses present on palpation    Chest  · Respiratory Effort: breathing normal  · Auscultation: normal breath sounds    Cardiovascular  · Heart:  · Auscultation: regular rate and rhythm without murmur    Breasts  · Inspection of Breasts: breasts symmetrical, no skin changes, no discharge present, nipple appearance normal, no skin retraction present  · Palpation of Breasts and Axillae: no masses present on palpation, no breast tenderness  · Axillary Lymph Nodes: no lymphadenopathy present    Gastrointestinal  · Abdominal Examination: abdomen non-tender to palpation, normal bowel sounds, no masses present  · Liver and spleen: no hepatomegaly present, spleen not palpable  · Hernias: no hernias identified    Genitourinary  · External Genitalia: normal appearance for age, no discharge present, no tenderness present, no inflammatory lesions present, no masses present, no atrophy present  · Vagina: normal vaginal vault without central or paravaginal defects, no discharge present, no inflammatory lesions present, no masses present  · Bladder: non-tender to palpation  · Urethra: appears normal  · Cervix: normal   · Uterus: normal size, shape and consistency  · Adnexa: no adnexal tenderness present, no adnexal masses present  · Perineum: perineum within normal limits, no evidence of trauma, no rashes or skin lesions present  · Anus: anus within normal limits, no hemorrhoids present  · Inguinal Lymph Nodes: no lymphadenopathy present    Skin  · General Inspection: no rash, no lesions identified    Neurologic/Psychiatric  · Mental Status:  · Orientation: grossly oriented to person, place and time  · Mood and Affect: mood normal, affect appropriate    . Assessment:  Routine gynecologic examination  Her current medical status is satisfactory with no evidence of significant gynecologic issues.     Plan:  Counseled re: diet, exercise, healthy lifestyle  Return for yearly wellness visits  Cont OCP

## 2021-03-03 ENCOUNTER — OFFICE VISIT (OUTPATIENT)
Dept: OBGYN CLINIC | Age: 31
End: 2021-03-03
Payer: COMMERCIAL

## 2021-03-03 VITALS
HEIGHT: 60 IN | DIASTOLIC BLOOD PRESSURE: 82 MMHG | SYSTOLIC BLOOD PRESSURE: 123 MMHG | WEIGHT: 140.8 LBS | BODY MASS INDEX: 27.64 KG/M2

## 2021-03-03 DIAGNOSIS — Z01.419 ENCOUNTER FOR GYNECOLOGICAL EXAMINATION (GENERAL) (ROUTINE) WITHOUT ABNORMAL FINDINGS: Primary | ICD-10-CM

## 2021-03-03 PROCEDURE — 99395 PREV VISIT EST AGE 18-39: CPT | Performed by: OBSTETRICS & GYNECOLOGY

## 2021-03-03 NOTE — PATIENT INSTRUCTIONS
Well Visit, Ages 25 to 48: Care Instructions Your Care Instructions Physical exams can help you stay healthy. Your doctor has checked your overall health and may have suggested ways to take good care of yourself. He or she also may have recommended tests. At home, you can help prevent illness with healthy eating, regular exercise, and other steps. Follow-up care is a key part of your treatment and safety. Be sure to make and go to all appointments, and call your doctor if you are having problems. It's also a good idea to know your test results and keep a list of the medicines you take. How can you care for yourself at home? · Reach and stay at a healthy weight. This will lower your risk for many problems, such as obesity, diabetes, heart disease, and high blood pressure. · Get at least 30 minutes of physical activity on most days of the week. Walking is a good choice. You also may want to do other activities, such as running, swimming, cycling, or playing tennis or team sports. Discuss any changes in your exercise program with your doctor. · Do not smoke or allow others to smoke around you. If you need help quitting, talk to your doctor about stop-smoking programs and medicines. These can increase your chances of quitting for good. · Talk to your doctor about whether you have any risk factors for sexually transmitted infections (STIs). Having one sex partner (who does not have STIs and does not have sex with anyone else) is a good way to avoid these infections. · Use birth control if you do not want to have children at this time. Talk with your doctor about the choices available and what might be best for you. · Protect your skin from too much sun. When you're outdoors from 10 a.m. to 4 p.m., stay in the shade or cover up with clothing and a hat with a wide brim. Wear sunglasses that block UV rays. Even when it's cloudy, put broad-spectrum sunscreen (SPF 30 or higher) on any exposed skin. · See a dentist one or two times a year for checkups and to have your teeth cleaned. · Wear a seat belt in the car. Follow your doctor's advice about when to have certain tests. These tests can spot problems early. For everyone · Cholesterol. Have the fat (cholesterol) in your blood tested after age 21. Your doctor will tell you how often to have this done based on your age, family history, or other things that can increase your risk for heart disease. · Blood pressure. Have your blood pressure checked during a routine doctor visit. Your doctor will tell you how often to check your blood pressure based on your age, your blood pressure results, and other factors. · Vision. Talk with your doctor about how often to have a glaucoma test. 
· Diabetes. Ask your doctor whether you should have tests for diabetes. · Colon cancer. Your risk for colorectal cancer gets higher as you get older. Some experts say that adults should start regular screening at age 48 and stop at age 76. Others say to start before age 48 or continue after age 76. Talk with your doctor about your risk and when to start and stop screening. For women · Breast exam and mammogram. Talk to your doctor about when you should have a clinical breast exam and a mammogram. Medical experts differ on whether and how often women under 50 should have these tests. Your doctor can help you decide what is right for you. · Cervical cancer screening test and pelvic exam. Begin with a Pap test at age 24. The test often is part of a pelvic exam. Starting at age 27, you may choose to have a Pap test, an HPV test, or both tests at the same time (called co-testing). Talk with your doctor about how often to have testing. · Tests for sexually transmitted infections (STIs). Ask whether you should have tests for STIs. You may be at risk if you have sex with more than one person, especially if your partners do not wear condoms. For men · Tests for sexually transmitted infections (STIs). Ask whether you should have tests for STIs. You may be at risk if you have sex with more than one person, especially if you do not wear a condom. · Testicular cancer exam. Ask your doctor whether you should check your testicles regularly. · Prostate exam. Talk to your doctor about whether you should have a blood test (called a PSA test) for prostate cancer. Experts differ on whether and when men should have this test. Some experts suggest it if you are older than 39 and are -American or have a father or brother who got prostate cancer when he was younger than 72. When should you call for help? Watch closely for changes in your health, and be sure to contact your doctor if you have any problems or symptoms that concern you. Where can you learn more? Go to http://www.lopez.com/ Enter P072 in the search box to learn more about \"Well Visit, Ages 25 to 48: Care Instructions. \" Current as of: May 27, 2020               Content Version: 12.6 © 2006-2020 [employer]. Care instructions adapted under license by [employer] (which disclaims liability or warranty for this information). If you have questions about a medical condition or this instruction, always ask your healthcare professional. [address] any warranty or liability for your use of this information.

## 2021-03-15 DIAGNOSIS — F41.9 ANXIETY AND DEPRESSION: ICD-10-CM

## 2021-03-15 DIAGNOSIS — F32.A ANXIETY AND DEPRESSION: ICD-10-CM

## 2021-03-15 RX ORDER — ESCITALOPRAM OXALATE 10 MG/1
10 TABLET ORAL DAILY
Qty: 30 TAB | Refills: 2 | Status: SHIPPED | OUTPATIENT
Start: 2021-03-15 | End: 2021-08-25 | Stop reason: ALTCHOICE

## 2021-05-13 DIAGNOSIS — M79.7 FIBROMYALGIA: ICD-10-CM

## 2021-05-16 RX ORDER — AMITRIPTYLINE HYDROCHLORIDE 25 MG/1
TABLET, FILM COATED ORAL
Qty: 90 TAB | Refills: 0 | Status: SHIPPED | OUTPATIENT
Start: 2021-05-16 | End: 2021-08-17

## 2021-07-09 ENCOUNTER — TELEPHONE (OUTPATIENT)
Dept: FAMILY MEDICINE CLINIC | Age: 31
End: 2021-07-09

## 2021-07-09 DIAGNOSIS — M54.9 BACK PAIN, UNSPECIFIED BACK LOCATION, UNSPECIFIED BACK PAIN LATERALITY, UNSPECIFIED CHRONICITY: Primary | ICD-10-CM

## 2021-07-09 RX ORDER — CYCLOBENZAPRINE HCL 5 MG
5 TABLET ORAL
Qty: 30 TABLET | Refills: 0 | Status: SHIPPED | OUTPATIENT
Start: 2021-07-09

## 2021-07-09 NOTE — TELEPHONE ENCOUNTER
----- Message from Taasera sent at 7/9/2021  9:51 AM EDT -----  Regarding: MD Vijay/Telephone  General Message/Vendor Calls    Caller's first and last name: Self      Reason for call: Requesting muscle relaxer for back tightness. Callback required yes/no and why: Yes      Best contact number(s): [phone]      Details to clarify the request: Pt experiencing back pain and muscle tightness for 2 days now, getting worse. Is on anti-inflammatory for having sinus infection that is almost gone, also taking naproxen and tylenol, along with using a heating pad. Says nothing is helping to loosen it up. Asking if she could get a muscle relaxer prescribed without being seen since pcp is booked out to end of month.       [employer]

## 2021-07-20 RX ORDER — TERCONAZOLE 8 MG/G
1 CREAM VAGINAL
Qty: 20 G | Refills: 0 | Status: SHIPPED | OUTPATIENT
Start: 2021-07-20 | End: 2021-08-25 | Stop reason: ALTCHOICE

## 2021-07-20 RX ORDER — ATORVASTATIN CALCIUM 10 MG/1
TABLET, FILM COATED ORAL
Qty: 90 TABLET | Refills: 1 | Status: SHIPPED | OUTPATIENT
Start: 2021-07-20 | End: 2022-01-23

## 2021-08-17 DIAGNOSIS — M79.7 FIBROMYALGIA: ICD-10-CM

## 2021-08-17 RX ORDER — AMITRIPTYLINE HYDROCHLORIDE 25 MG/1
TABLET, FILM COATED ORAL
Qty: 90 TABLET | Refills: 0 | Status: SHIPPED | OUTPATIENT
Start: 2021-08-17 | End: 2021-12-15

## 2021-08-25 ENCOUNTER — OFFICE VISIT (OUTPATIENT)
Dept: FAMILY MEDICINE CLINIC | Age: 31
End: 2021-08-25
Payer: COMMERCIAL

## 2021-08-25 VITALS
OXYGEN SATURATION: 98 % | HEART RATE: 87 BPM | HEIGHT: 60 IN | SYSTOLIC BLOOD PRESSURE: 116 MMHG | WEIGHT: 140 LBS | BODY MASS INDEX: 27.48 KG/M2 | TEMPERATURE: 98.9 F | RESPIRATION RATE: 17 BRPM | DIASTOLIC BLOOD PRESSURE: 76 MMHG

## 2021-08-25 DIAGNOSIS — E78.00 HYPERCHOLESTEROLEMIA: Primary | ICD-10-CM

## 2021-08-25 DIAGNOSIS — Z79.899 ENCOUNTER FOR LONG-TERM CURRENT USE OF MEDICATION: ICD-10-CM

## 2021-08-25 DIAGNOSIS — Z11.59 NEED FOR HEPATITIS C SCREENING TEST: ICD-10-CM

## 2021-08-25 LAB
COMMENT, HOLDF: NORMAL
SAMPLES BEING HELD,HOLD: NORMAL

## 2021-08-25 PROCEDURE — 99214 OFFICE O/P EST MOD 30 MIN: CPT | Performed by: FAMILY MEDICINE

## 2021-08-25 NOTE — PATIENT INSTRUCTIONS

## 2021-08-25 NOTE — PROGRESS NOTES
Room:     Identified pt with two pt identifiers(name and ). Reviewed record in preparation for visit and have obtained necessary documentation. All patient medications has been reviewed. Chief Complaint   Patient presents with    Routine   [address] Maintenance Due   Topic    Hepatitis C Screening        Vitals:    21 1007   BP: 116/76   Pulse: 87   Resp: 17   Temp: 98.9 °F (37.2 °C)   SpO2: 98%   Weight: 140 lb (63.5 kg)   Height: 5' (1.524 m)   PainSc:   0 - No pain       4. Have you been to the ER, urgent care clinic since your last visit? Hospitalized since your last visit? No    5. Have you seen or consulted any other health care providers outside of the [address] [place] [address] since your last visit? Include any pap smears or colon screening. No        Patient is accompanied by self I have received verbal consent from Osmani Acuña to discuss any/all medical information while they are present in the room.

## 2021-08-25 NOTE — PROGRESS NOTES
Subjective:     Ishmael Benavides is a 33 y.o. female who presents for follow up of hyperlipidemiahyperlipidemia. Diet and Lifestyle: generally follows a low fat low cholesterol diet, generally follows a low sodium diet, exercises regularly, nonsmoker  Home BP Monitoring: is not measured at home    Cardiovascular ROS: taking medications as instructed, no medication side effects noted, no TIA's, no chest pain on exertion, no dyspnea on exertion, no swelling of ankles. New concerns: due for repeat labs. She has weaned off Lexapro about 3 months. Mood is doing fine. Patient Active Problem List    Diagnosis Date Noted    H/O seasonal allergies     Fibromyalgia     Celiac disease      Current Outpatient Medications   Medication Sig Dispense Refill    amitriptyline (ELAVIL) 25 mg tablet TAKE 1 TABLET BY MOUTH EVERY DAY EVERY NIGHT 90 Tablet 0    atorvastatin (LIPITOR) 10 mg tablet TAKE 1 TABLET BY MOUTH EVERY DAY 90 Tablet 1    norethindrone-e estradiol-iron (Lomedia 24 Fe) 1 mg-20 mcg (24)/75 mg (4) tab Take 1 Tab by mouth daily. 3 Package 4    amitriptyline (ELAVIL) 10 mg tablet TAKE 1 TABLET BY MOUTH DAILY AS NEEDED FOR PAIN (Patient taking differently: Take 10 mg by mouth daily as needed. For breakthrough pain) 90 Tab 1    Lactobacillus acidophilus (PROBIOTIC PO) Take 1 Cap by mouth daily.  multivitamin (ONE A DAY) tablet Take 1 Tab by mouth daily.  cetirizine (ZYRTEC) 10 mg tablet Take 10 mg by mouth daily.  cyclobenzaprine (FLEXERIL) 5 mg tablet Take 1 Tablet by mouth three (3) times daily as needed for Muscle Spasm(s). Indications: muscle spasm (Patient not taking: Reported on 8/25/2021) 30 Tablet 0     Allergies   Allergen Reactions    Amoxicillin Hives    Gluten Unable to Obtain             Review of Systems, additional:  Pertinent items are noted in HPI.     Objective:     Visit Vitals  /76   Pulse 87   Temp 98.9 °F (37.2 °C)   Resp 17   Ht 5' (1.524 m)   Wt 140 lb (63.5 kg)   SpO2 98%   BMI 27.34 kg/m²     Appearance: alert, well appearing, and in no distress. General exam: CVS exam BP noted to be well controlled today in office, S1, S2 normal, no gallop, no murmur, chest clear, no JVD, no HSM, no edema. Lab review: orders written for new lab studies as appropriate; see orders. Assessment/Plan:     hyperlipidemia . orders and follow up as documented in patient record. ICD-10-CM ICD-9-CM    1. Hypercholesterolemia  E78.00 272.0 LIPID PANEL      LIPID PANEL   2. Encounter for long-term current use of medication  U32.768 F39.64 METABOLIC PANEL, COMPREHENSIVE      METABOLIC PANEL, COMPREHENSIVE   3.  Need for hepatitis C screening test  Z11.59 V73.89 HEPATITIS C AB      HEPATITIS C AB

## 2021-08-26 LAB
ALBUMIN SERPL-MCNC: 4.1 G/DL (ref 3.5–5)
ALBUMIN/GLOB SERPL: 1.6 {RATIO} (ref 1.1–2.2)
ALP SERPL-CCNC: 42 U/L (ref 45–117)
ALT SERPL-CCNC: 18 U/L (ref 12–78)
ANION GAP SERPL CALC-SCNC: 4 MMOL/L (ref 5–15)
AST SERPL-CCNC: 15 U/L (ref 15–37)
BILIRUB SERPL-MCNC: 0.4 MG/DL (ref 0.2–1)
BUN SERPL-MCNC: 13 MG/DL (ref 6–20)
BUN/CREAT SERPL: 16 (ref 12–20)
CALCIUM SERPL-MCNC: 8.7 MG/DL (ref 8.5–10.1)
CHLORIDE SERPL-SCNC: 108 MMOL/L (ref 97–108)
CHOLEST SERPL-MCNC: 170 MG/DL
CO2 SERPL-SCNC: 27 MMOL/L (ref 21–32)
CREAT SERPL-MCNC: 0.79 MG/DL (ref 0.55–1.02)
GLOBULIN SER CALC-MCNC: 2.6 G/DL (ref 2–4)
GLUCOSE SERPL-MCNC: 78 MG/DL (ref 65–100)
HCV AB SERPL QL IA: NONREACTIVE
HDLC SERPL-MCNC: 88 MG/DL
HDLC SERPL: 1.9 {RATIO} (ref 0–5)
LDLC SERPL CALC-MCNC: 66 MG/DL (ref 0–100)
POTASSIUM SERPL-SCNC: 3.9 MMOL/L (ref 3.5–5.1)
PROT SERPL-MCNC: 6.7 G/DL (ref 6.4–8.2)
SODIUM SERPL-SCNC: 139 MMOL/L (ref 136–145)
TRIGL SERPL-MCNC: 80 MG/DL (ref ?–150)
VLDLC SERPL CALC-MCNC: 16 MG/DL

## 2021-08-27 NOTE — PROGRESS NOTES
Excellent cholesterol numbers. Please continue on current statin. Normal kidney and liver function. Negative screen for Hepatitis C. Follow up in 6 months.

## 2021-10-06 DIAGNOSIS — M79.7 FIBROMYALGIA: ICD-10-CM

## 2021-10-06 RX ORDER — AMITRIPTYLINE HYDROCHLORIDE 10 MG/1
TABLET, FILM COATED ORAL
Qty: 90 TABLET | Refills: 1 | Status: SHIPPED | OUTPATIENT
Start: 2021-10-06 | End: 2022-04-22

## 2021-12-15 DIAGNOSIS — M79.7 FIBROMYALGIA: ICD-10-CM

## 2021-12-15 RX ORDER — AMITRIPTYLINE HYDROCHLORIDE 25 MG/1
TABLET, FILM COATED ORAL
Qty: 90 TABLET | Refills: 3 | Status: SHIPPED | OUTPATIENT
Start: 2021-12-15

## 2022-01-23 RX ORDER — ATORVASTATIN CALCIUM 10 MG/1
TABLET, FILM COATED ORAL
Qty: 90 TABLET | Refills: 1 | Status: SHIPPED | OUTPATIENT
Start: 2022-01-23 | End: 2022-07-24

## 2022-01-23 NOTE — PROGRESS NOTES
Delivery Note    Patient C/C/+2, pushed over intact perineum. LBMI  Tight but reducible nuchal cord x2  Delayed cord clamping  Spontaneous placenta delivery. Laceration repaired small second degree repaired with 3-0 vicryl.
1100 Discharge completed awaiting lactation. 1220 Pt off unit in stable condition via wheelchair with volunteers for discharge home per Dr. Evonne To. Pt is to follow-up in 6 weeks and is aware. Prescriptions given to pt. Pt denies any HA, Dizziness, N/V or pain at this time. Infant in car seat with mother.
2325: Pt placed on monitor. Pt arrived to unit c/o SROM that occurred at 2225. Nitrazine positive by DARCY Solis RN. Per Dr. Mateo Morales, pt to be admitted. Luís discusses antibiotic for GBS prophylaxis with Dr. Mateo Morales, order to be placed. MD approves of pt to be intermittent monitored during early labor provided reactive tracing and epidural if pt desires. 1334: Pt off monitor to ambulate in hallway and use birthing ball    0135: Pt placed on monitor for NST    0220: Pt off monitor to ambulate in hallway    0326: Pt placed on monitor for NST.     0520: Pain management discussed with pt. Pt states that she is not relaxing between contractions anymore, is incredibly uncomfortable and desires an epidural at this time. Bolus to be continued and epidural to be done as soon as possible. Pt sitting on side of bed, intermittent maternal HR tracing due to maternal movement and position. 3474: Dr. Benjamin Arevalo at bedside. Pt positioned on side of bed, FHR difficult to trace due to maternal position, FHR readjusted    0620: Pt laid back down after epidural, left hip tilt, procedure tolerated well, FHR and ctx tracing readjusted. 0700: Updated Dr. Mateo Morales of pt's early/variable decelerations.
3856 called Dr. Adams regarding variables with moderate ltv, amnioinfusion ordered
4502 Bedside and Verbal shift change report given to Carolyn Tran RN (oncoming nurse) by Eulogio Llamas RN (offgoing nurse). Report included the following information SBAR, Kardex, Procedure Summary, Intake/Output, MAR and Recent Results. Dr. Funmilayo Medley at bedside. SVE by MD 3-4/80/-1. IUPC placed at this time. 7809 FSE placed by Dr. Funmilayo Medley at this time. 0820 Changed chux pads, small amount of fluid noted. Patient turned to lateral left with peanut ball. 0830 VORB from Dr. Marcio Frost to give another 300mL NS bolus amnioinfusion and then have maintenance at 125mL/hr. Will continue to monitor. 0930 Chux pads changed, small amount of fluid noted. 1525 TRANSFER - OUT REPORT:    Verbal report given to Kyra Maza RN(name) on Samechas Tineoing  being transferred to MIU(unit) for routine progression of care       Report consisted of patients Situation, Background, Assessment and   Recommendations(SBAR). Information from the following report(s) SBAR, Kardex, Procedure Summary, Intake/Output, MAR and Recent Results was reviewed with the receiving nurse. Lines:   Peripheral IV 06/23/17 Left Forearm (Active)   Site Assessment Clean, dry, & intact 6/24/2017  7:20 AM   Phlebitis Assessment 0 6/24/2017  7:20 AM   Infiltration Assessment 0 6/24/2017  7:20 AM   Dressing Status Clean, dry, & intact 6/24/2017  7:20 AM   Dressing Type Transparent;Tape 6/24/2017  7:20 AM   Hub Color/Line Status Pink; Infusing 6/24/2017  7:20 AM   Action Taken Blood drawn 6/23/2017 11:50 PM   Alcohol Cap Used Yes 6/24/2017  7:20 AM        Opportunity for questions and clarification was provided.       Patient transported with:   Registered Nurse
Bedside and Verbal shift change report given to Ector Geronimo RN (oncoming nurse) by Henok Bass RN (offgoing nurse). Report included the following information SBAR, Kardex, Procedure Summary, Intake/Output, MAR, Recent Results and Med Rec Status.
Bedside and Verbal shift change report given to Miriam Moses RN (oncoming nurse) by Jack Mercado RN (offgoing nurse). Report included the following information SBAR, Kardex, Intake/Output and MAR.
Bedside and Verbal shift change report given to Nydia Castrorn (oncoming nurse) by Nelda Maldonado. Lawyer Goldman (offgoing nurse). Report given with SBAR, Kardex, Intake/Output and MAR.
Bedside shift change report given to Becky Saldana RN (oncoming nurse) by Aura Zamora RN (offgoing nurse). Report included the following information SBAR, Kardex, Intake/Output and MAR.
IUPC and FSE applied secondary to possible continued variables    FHR 130s with early variaables with recovery to baseline good variablity noted  Do amnioinfusion to see if can help resolve
Labor Progress Note    Patient seen, fetal heart rate and contraction pattern evaluated. Pt complains of pressure    Physical Exam:    Cervical Exam: c/c +2 DAVIS    Membranes:  s/p srom    Uterine Activity:  q3-4 min    Fetal heart variability: moderate  Fetal Heart Rate decelerations: variable to 80s < 1min with spontaneous recovery with good variability   Fetal Heart Rate accelerations: yes  Baseline FHR: 130 per minute    Assessment/Plan:  32 y.o.  40w2d   Labor    Reassuring fetal status.    Anticipate   CEFM/EDUO    Monica Jordan MD
Post-Partum Day Number 2 Progress Note    Leveda Pap     Information for the patient's :  Reshma Watson, Male [[id number]]   Vaginal, Spontaneous Delivery   Patient doing well without significant complaint. Voiding without difficulty, normal lochia. Vitals:  Visit Vitals    /69 (BP 1 Location: Left arm, BP Patient Position: Sitting;Post activity)    Pulse (!) 56    Temp 98.3 °F (36.8 °C)    Resp 16    Ht 5' (1.524 m)    Wt 150 lb (68 kg)    LMP 09/15/2016    SpO2 100%    Breastfeeding Unknown    BMI 29.29 kg/m2     Temp (24hrs), Av.3 °F (36.8 °C), Min:98.2 °F (36.8 °C), Max:98.3 °F (36.8 °C)      Exam:         Patient without distress. Abdomen soft, fundus firm, nontender                 ower extremities are negative for swelling, cords or tenderness. Labs:     Lab Results   Component Value Date/Time    WBC 8.9 2017 11:57 PM    WBC 5.6 2016 09:28 AM    HGB 11.1 2017 11:57 PM    HGB 12.1 2016 09:28 AM    HCT 32.0 2017 11:57 PM    HCT 36.1 2016 09:28 AM    PLATELET 778  11:57 PM    PLATELET 319  09:28 AM    Hgb, External 11.2 2017    Hgb, External 12.1 2016    Hct, External 33.2 2017    Hct, External 36.1 2016    Platelet cnt., External 235 2017    Platelet cnt., External 247 2016       No results found for this or any previous visit (from the past 24 hour(s)). Assessment: Doing well, post partum day 2    Plan:   1. Discharge home today  2. Follow up in office in 6 weeks with Rufus Redmond MD  3. Post partum activity advised, diet as tolerated  4.  Discharge Medications: ibuprofen, percocet and medications prior to admission
Post-Partum Progress Note    Patient doing well post-partum without significant complaint. She is voiding without difficulty, she reports normal lochia. Her pain is well controlled with oral pain medication. She is tolerating a general diet. Delivery information:  Information for the patient's :  Juan Burgos, Male [[id number]]   Delivery of a 6 lb 7.2 oz (2.925 kg) male infant via Vaginal, Spontaneous Delivery on 2017 at 11:00 AM  by . Apgars were 9 and 9. Vitals:  Patient Vitals for the past 8 hrs:   BP Temp Pulse Resp   17 0751 108/52 98.6 °F (37 °C) (!) 59 16     Temp (24hrs), Av.5 °F (36.9 °C), Min:98.1 °F (36.7 °C), Max:98.8 °F (37.1 °C)    Visit Vitals    /52 (BP 1 Location: Left arm, BP Patient Position: At rest)    Pulse (!) 59    Temp 98.6 °F (37 °C)    Resp 16    Ht 5' (1.524 m)    Wt 150 lb (68 kg)    SpO2 100%    Breastfeeding Unknown    BMI 29.29 kg/m2       Exam:    General: Patient without distress. Abdomen: soft, fundus firm at level of umbilicus, nontender  Lower extremities: negative for cords or tenderness. Skin has erythematous rash at site of anesthesia tape on back    Labs: No results found for this or any previous visit (from the past 24 hour(s)). Assessment:    1. Postpartum S/P spontaneous vaginal delivery   2. Patient doing well without significant complications  3. dermatitis  Plan:  1. Continue routine postpartum care  2. Routine perineal care and maternal education   3. Oral pain medications and bowel regimen as needed              4. Circumcision consent reviewed with pt  5.  Topical treatment, notify MD if ni    Bhakti Palomo MD
SBAR IN Report: Mother    Verbal report received from The [address] (full name & credentials) on this patient, who is now being transferred from  and D (unit) for routine progression of care. The patient is not wearing a green \"Anesthesia-Duramorph\" band. Report consisted of patient's Situation, Background, Assessment and Recommendations (SBAR).  ID bands were compared with the identification form, and verified with the patient and transferring nurse. Information from the SBAR, Kardex, Intake/Output and MAR and the [place] Report was reviewed with the transferring nurse; opportunity for questions and clarification provided.
Speaking Coherently

## 2022-01-27 ENCOUNTER — OFFICE VISIT (OUTPATIENT)
Dept: FAMILY MEDICINE CLINIC | Age: 32
End: 2022-01-27
Payer: COMMERCIAL

## 2022-01-27 VITALS
WEIGHT: 147 LBS | OXYGEN SATURATION: 97 % | SYSTOLIC BLOOD PRESSURE: 112 MMHG | DIASTOLIC BLOOD PRESSURE: 72 MMHG | RESPIRATION RATE: 16 BRPM | BODY MASS INDEX: 28.86 KG/M2 | TEMPERATURE: 97.8 F | HEART RATE: 92 BPM | HEIGHT: 60 IN

## 2022-01-27 DIAGNOSIS — R63.5 WEIGHT GAIN: ICD-10-CM

## 2022-01-27 DIAGNOSIS — R53.83 FATIGUE, UNSPECIFIED TYPE: ICD-10-CM

## 2022-01-27 DIAGNOSIS — Z00.00 PHYSICAL EXAM: Primary | ICD-10-CM

## 2022-01-27 DIAGNOSIS — E78.2 MIXED HYPERLIPIDEMIA: ICD-10-CM

## 2022-01-27 PROCEDURE — 99395 PREV VISIT EST AGE 18-39: CPT | Performed by: NURSE PRACTITIONER

## 2022-01-27 NOTE — PATIENT INSTRUCTIONS
Well Visit, Ages 25 to 48: Care Instructions  Overview     Well visits can help you stay healthy. Your doctor has checked your overall health and may have suggested ways to take good care of yourself. Your doctor also may have recommended tests. At home, you can help prevent illness with healthy eating, regular exercise, and other steps. Follow-up care is a key part of your treatment and safety. Be sure to make and go to all appointments, and call your doctor if you are having problems. It's also a good idea to know your test results and keep a list of the medicines you take. How can you care for yourself at home? · Get screening tests that you and your doctor decide on. Screening helps find diseases before any symptoms appear. · Eat healthy foods. Choose fruits, vegetables, whole grains, protein, and low-fat dairy foods. Limit fat, especially saturated fat. Reduce salt in your diet. · Limit alcohol. If you are a man, have no more than 2 drinks a day or 14 drinks a week. If you are a woman, have no more than 1 drink a day or 7 drinks a week. · Get at least 30 minutes of physical activity on most days of the week. Walking is a good choice. You also may want to do other activities, such as running, swimming, cycling, or playing tennis or team sports. Discuss any changes in your exercise program with your doctor. · Reach and stay at a healthy weight. This will lower your risk for many problems, such as obesity, diabetes, heart disease, and high blood pressure. · Do not smoke or allow others to smoke around you. If you need help quitting, talk to your doctor about stop-smoking programs and medicines. These can increase your chances of quitting for good. · Care for your mental health. It is easy to get weighed down by worry and stress. Learn strategies to manage stress, like deep breathing and mindfulness, and stay connected with your family and community.  If you find you often feel sad or hopeless, talk with your doctor. Treatment can help. · Talk to your doctor about whether you have any risk factors for sexually transmitted infections (STIs). You can help prevent STIs if you wait to have sex with a new partner (or partners) until you've each been tested for STIs. It also helps if you use condoms (male or female condoms) and if you limit your sex partners to one person who only has sex with you. Vaccines are available for some STIs, such as HPV. · Use birth control if it's important to you to prevent pregnancy. Talk with your doctor about the choices available and what might be best for you. · If you think you may have a problem with alcohol or drug use, talk to your doctor. This includes prescription medicines (such as amphetamines and opioids) and illegal drugs (such as cocaine and methamphetamine). Your doctor can help you figure out what type of treatment is best for you. · Protect your skin from too much sun. When you're outdoors from 10 a.m. to 4 p.m., stay in the shade or cover up with clothing and a hat with a wide brim. Wear sunglasses that block UV rays. Even when it's cloudy, put broad-spectrum sunscreen (SPF 30 or higher) on any exposed skin. · See a dentist one or two times a year for checkups and to have your teeth cleaned. · Wear a seat belt in the car. When should you call for help? Watch closely for changes in your health, and be sure to contact your doctor if you have any problems or symptoms that concern you. Where can you learn more? Go to http://www.[employer].com/  Enter P072 in the search box to learn more about \"Well Visit, Ages 25 to 48: Care Instructions. \"  Current as of: February 11, 2021               Content Version: 13.0  © 5349-7489 Healthwise, Incorporated. Care instructions adapted under license by [employer] (which disclaims liability or warranty for this information).  If you have questions about a medical condition or this instruction, always ask your healthcare professional. [place] [address] any warranty or liability for your use of this information.

## 2022-01-27 NOTE — PROGRESS NOTES
Subjective:   27 y.o. female for Well Woman Check. Her gyne and breast care is done elsewhere by her Ob-Gyne physician. Patient Active Problem List    Diagnosis Date Noted    H/O seasonal allergies     Fibromyalgia     Celiac disease      Current Outpatient Medications   Medication Sig Dispense Refill    atorvastatin (LIPITOR) 10 mg tablet TAKE 1 TABLET BY MOUTH EVERY DAY 90 Tablet 1    amitriptyline (ELAVIL) 25 mg tablet TAKE 1 TABLET BY MOUTH EVERY DAY EVERY NIGHT 90 Tablet 3    amitriptyline (ELAVIL) 10 mg tablet TAKE 1 TABLET BY MOUTH DAILY AS NEEDED FOR PAIN 90 Tablet 1    cyclobenzaprine (FLEXERIL) 5 mg tablet Take 1 Tablet by mouth three (3) times daily as needed for Muscle Spasm(s). Indications: muscle spasm 30 Tablet 0    norethindrone-e estradiol-iron (Lomedia 24 Fe) 1 mg-20 mcg (24)/75 mg (4) tab Take 1 Tab by mouth daily. 3 Package 4    Lactobacillus acidophilus (PROBIOTIC PO) Take 1 Cap by mouth daily.  multivitamin (ONE A DAY) tablet Take 1 Tab by mouth daily.  cetirizine (ZYRTEC) 10 mg tablet Take 10 mg by mouth daily. Allergies   Allergen Reactions    Amoxicillin Hives    Gluten Unable to Obtain     Past Medical History:   Diagnosis Date    Anxiety     Asthma     exercise induced    Calculus of kidney     Celiac disease     Depression     Epilepsy ([facility] [address]     seizures when 16-15 yo, on medication for 2 years, undetermined cause, no problems since that time    Fibromyalgia     GERD (gastroesophageal reflux disease)     H/O seasonal allergies      Past Surgical History:   Procedure Laterality Date    HX GYN      HX TONSILLECTOMY       Family History   Problem Relation Age of Onset    Elevated Lipids Father     Hypertension Father     No Known Problems Mother      Social History     Tobacco Use    Smoking status: Never Smoker    Smokeless tobacco: Never Used   Substance Use Topics    Alcohol use:  Yes     Alcohol/week: 1.0 standard drink     Types: 1 Standard drinks or equivalent per week     Comment: social        Lab Results   Component Value Date/Time    WBC 4.6 01/08/2021 09:20 AM    HGB 12.7 01/08/2021 09:20 AM    HCT 38.2 01/08/2021 09:20 AM    PLATELET 630 16/46/7925 09:20 AM    MCV 95.0 01/08/2021 09:20 AM    Hgb, External 11.2 03/30/2017 12:00 AM    Hct, External 33.2 03/30/2017 12:00 AM    Platelet cnt., External 235 03/30/2017 12:00 AM     Lab Results   Component Value Date/Time    Cholesterol, total 170 08/25/2021 11:43 AM    HDL Cholesterol 88 08/25/2021 11:43 AM    LDL-CHOLESTEROL 99 04/25/2018 10:10 AM    LDL, calculated 66 08/25/2021 11:43 AM    Triglyceride 80 08/25/2021 11:43 AM    CHOL/HDL Ratio 1.9 08/25/2021 11:43 AM    Cholesterol/HDL ratio 2.5 04/25/2018 10:10 AM     Lab Results   Component Value Date/Time    GFR est non-AA >60 08/25/2021 11:43 AM    GFR est AA >60 08/25/2021 11:43 AM    Creatinine 0.79 08/25/2021 11:43 AM    BUN 13 08/25/2021 11:43 AM    Sodium 139 08/25/2021 11:43 AM    Potassium 3.9 08/25/2021 11:43 AM    Chloride 108 08/25/2021 11:43 AM    CO2 27 08/25/2021 11:43 AM        ROS: Feeling generally well. No TIA's or unusual headaches, no dysphagia. No prolonged cough. No dyspnea or chest pain on exertion. No abdominal pain, change in bowel habits, black or bloody stools. No urinary tract symptoms. No new or unusual musculoskeletal symptoms. Specific concerns today: Pt is here for physical and fasting labs. She is interested in getting her thyroid and Vit D levels checked, as she deals with chronic fatigue. In addition, she has had some weight gain and has not been able to lose it. Objective: The patient appears well, alert, oriented x 3, in no distress.   Visit Vitals  /72 (BP 1 Location: Right arm, BP Patient Position: Sitting, BP Cuff Size: Adult)   Pulse 92   Temp 97.8 °F (36.6 °C) (Temporal)   Resp 16   Ht 5' (1.524 m)   Wt 147 lb (66.7 kg)   LMP 01/01/2022 (Approximate)   SpO2 97%   BMI 28.71 kg/m² ENT normal.  Neck supple. No adenopathy or thyromegaly. FORD. Lungs are clear, good air entry, no wheezes, rhonchi or rales. S1 and S2 normal, no murmurs, regular rate and rhythm. Abdomen soft without tenderness, guarding, mass or organomegaly. Extremities show no edema, normal peripheral pulses. Neurological is normal, no focal findings. Breast and Pelvic exams are deferred. Assessment/Plan:   Well Woman  continue present plan, call if any problems    ICD-10-CM ICD-9-CM    1. Physical exam  Z00.00 V70.9 CBC W/O DIFF      METABOLIC PANEL, COMPREHENSIVE      LIPID PANEL      THYROID CASCADE PROFILE   2. Fatigue, unspecified type  R53.83 780.79 THYROID CASCADE PROFILE      VITAMIN D, 25 HYDROXY   3. Weight gain  R63.5 783.1 THYROID CASCADE PROFILE     Will notify when labs return, and inform her of any change in plan of care at that time    Pt informed to return to office with worsening of symptoms, or PRN with any questions or concerns. Pt verbalizes understanding of plan of care and denies further questions or concerns at this time.

## 2022-01-27 NOTE — PROGRESS NOTES
Identified pt with two pt identifiers(name and ). Chief Complaint   Patient presents with    Physical    Labs     fasting        Health Maintenance Due   Topic    COVID-19 Vaccine (3 - Booster for Moderna series)    Flu Vaccine (1)       Wt Readings from Last 3 Encounters:   22 147 lb (66.7 kg)   21 140 lb (63.5 kg)   21 140 lb 12.8 oz (63.9 kg)     Temp Readings from Last 3 Encounters:   22 97.8 °F (36.6 °C) (Temporal)   21 98.9 °F (37.2 °C)   21 99.4 °F (37.4 °C) (Oral)     BP Readings from Last 3 Encounters:   22 112/72   21 116/76   21 123/82     Pulse Readings from Last 3 Encounters:   22 92   21 87   21 83         Learning Assessment:  :     Learning Assessment 10/16/2015   PRIMARY LEARNER Patient   BARRIERS PRIMARY LEARNER NONE   PRIMARY LANGUAGE ENGLISH   LEARNER PREFERENCE PRIMARY DEMONSTRATION   ANSWERED BY patient   RELATIONSHIP SELF       Depression Screening:  :     3 most recent PHQ Screens 2022   Little interest or pleasure in doing things Not at all   Feeling down, depressed, irritable, or hopeless Not at all   Total Score PHQ 2 0       Fall Risk Assessment:  :     No flowsheet data found. Abuse Screening:  :     Abuse Screening Questionnaire 2022   Do you ever feel afraid of your partner? N N N N   Are you in a relationship with someone who physically or mentally threatens you? N N N N   Is it safe for you to go home? Y Y Y Y       Coordination of Care Questionnaire:  :     1) Have you been to an emergency room, urgent care clinic since your last visit? no   Hospitalized since your last visit? no             2) Have you seen or consulted any other health care providers outside of [address] [place] [address] since your last visit? no  (Include any pap smears or colon screenings in this section.)    3) Do you have an Advance Directive on file?  no  Are you interested in receiving information about Advance Directives? no      Reviewed record in preparation for visit and have obtained necessary documentation.

## 2022-01-28 ENCOUNTER — TELEPHONE (OUTPATIENT)
Dept: FAMILY MEDICINE CLINIC | Age: 32
End: 2022-01-28

## 2022-01-28 LAB
25(OH)D3 SERPL-MCNC: 34 NG/ML (ref 30–100)
ALBUMIN SERPL-MCNC: 4.1 G/DL (ref 3.5–5)
ALBUMIN/GLOB SERPL: 1.4 {RATIO} (ref 1.1–2.2)
ALP SERPL-CCNC: 41 U/L (ref 45–117)
ALT SERPL-CCNC: 20 U/L (ref 12–78)
ANION GAP SERPL CALC-SCNC: 5 MMOL/L (ref 5–15)
AST SERPL-CCNC: 24 U/L (ref 15–37)
BILIRUB SERPL-MCNC: 0.4 MG/DL (ref 0.2–1)
BUN SERPL-MCNC: 13 MG/DL (ref 6–20)
BUN/CREAT SERPL: 17 (ref 12–20)
CALCIUM SERPL-MCNC: 9.2 MG/DL (ref 8.5–10.1)
CHLORIDE SERPL-SCNC: 106 MMOL/L (ref 97–108)
CHOLEST SERPL-MCNC: 177 MG/DL
CO2 SERPL-SCNC: 26 MMOL/L (ref 21–32)
CREAT SERPL-MCNC: 0.75 MG/DL (ref 0.55–1.02)
ERYTHROCYTE [DISTWIDTH] IN BLOOD BY AUTOMATED COUNT: 11.8 % (ref 11.5–14.5)
GLOBULIN SER CALC-MCNC: 2.9 G/DL (ref 2–4)
GLUCOSE SERPL-MCNC: 89 MG/DL (ref 65–100)
HCT VFR BLD AUTO: 37.4 % (ref 35–47)
HDLC SERPL-MCNC: 70 MG/DL
HDLC SERPL: 2.5 {RATIO} (ref 0–5)
HGB BLD-MCNC: 12.4 G/DL (ref 11.5–16)
LDLC SERPL CALC-MCNC: 84.2 MG/DL (ref 0–100)
MCH RBC QN AUTO: 32.1 PG (ref 26–34)
MCHC RBC AUTO-ENTMCNC: 33.2 G/DL (ref 30–36.5)
MCV RBC AUTO: 96.9 FL (ref 80–99)
NRBC # BLD: 0 K/UL (ref 0–0.01)
NRBC BLD-RTO: 0 PER 100 WBC
PLATELET # BLD AUTO: 248 K/UL (ref 150–400)
PMV BLD AUTO: 9.6 FL (ref 8.9–12.9)
POTASSIUM SERPL-SCNC: 4.4 MMOL/L (ref 3.5–5.1)
PROT SERPL-MCNC: 7 G/DL (ref 6.4–8.2)
RBC # BLD AUTO: 3.86 M/UL (ref 3.8–5.2)
SODIUM SERPL-SCNC: 137 MMOL/L (ref 136–145)
TRIGL SERPL-MCNC: 114 MG/DL (ref ?–150)
VLDLC SERPL CALC-MCNC: 22.8 MG/DL
WBC # BLD AUTO: 3.5 K/UL (ref 3.6–11)

## 2022-01-28 NOTE — PROGRESS NOTES
Please call patient and let her know that her labs returned, awaiting thyroid. Stable.   Will notify when thyroid levels returnThanks

## 2022-01-28 NOTE — TELEPHONE ENCOUNTER
----- Message from Michelle Howell NP sent at 1/28/2022  8:21 AM EST -----  Please call patient and let her know that her labs returned, awaiting thyroid. Stable.   Will notify when thyroid levels returnThanks

## 2022-01-29 LAB — TSH SERPL-ACNC: 1.43 UIU/ML (ref 0.45–4.5)

## 2022-01-31 ENCOUNTER — TELEPHONE (OUTPATIENT)
Dept: FAMILY MEDICINE CLINIC | Age: 32
End: 2022-01-31

## 2022-01-31 NOTE — TELEPHONE ENCOUNTER
----- Message from Sukhi Reddy NP sent at 1/31/2022  7:50 AM EST -----  Please call patient and let her know that her thyroid labs returned stable.   Thanks Discharge Instructions For Your Heart Catheterization/Stent with Brachial Site    Activity: For the next 24 hours  Follow these guidelines related to the sedation medicine that you've received.   · You must have someone drive you home.  · You may feel tired, unsteady, or not quite yourself for the remainder of the day due to the sedation medicine. Your body gets rid of the medicine usually in 24 hours.  · Do not drive or operate machinery or power tools.  · Do not drink alcohol or smoke.  · Do not make any important decisions or sign important papers.    Activity:   Follow these guidelines related to the puncture site in your arm.  · Minimal use of the arm used for the procedure for the remainder of the day. If possible, elevate your arm on a pillow and don't bend your elbow.  · No lifting more than 5 pounds for 5 days with the arm used for the procedure.  · Do not golf, do carpentry, play tennis or other vigorous arm activity for 1 week.   · No blood pressures, IV's, or blood draws from the affected arm for 24 hours post procedure.  · If you do heavy physical work on your job, follow your doctor's instructions about when you may return to work.  · Use ice pack for discomfort.    Procedure Site Care:  · Keep the dressing on your procedure site for the remainder of the day. The following day, you may remove the dressing and shower. Gently cleanse the procedure site with soap and water and a clean wash cloth and pat dry.   · Apply a new Band-aid on the puncture site for 1 day;  Do not apply lotions, powders, or creams.  · No soaking the arm in water (i.e., bathtub, hot tub, dish water, pool of water) until the wound has completely healed.     Common side effects you may notice  · Soreness at puncture site.  · Slight bruising at the site for several days to several weeks.  · Lump the size of a pea or marble at the puncture site for a few weeks.    Diet/Fluids  · Resume diet as prior to admission.  · If you had a  catheterization or stent, drink plenty of liquids to help flush the dye used for your procedure out of your body (unless you're on a fluid restriction ordered by your physician).    Medications  · Take mild pain reliever such as Tylenol/Acetaminophen as needed for pain.    Call your doctor with these issues  · Bleeding from the procedure site. If significant bleeding occurs or there is a growing lump at your site, apply firm pressure at the site where your dressing is. Call 911 and keep pressure on the site.  · Numbness, tingling or color change in the arm used for puncture site.  · Increasing pain and firmness near the puncture site.  · A temperature greater than 101 degrees.  · Redness or drainage around site.

## 2022-04-22 DIAGNOSIS — M79.7 FIBROMYALGIA: ICD-10-CM

## 2022-04-22 RX ORDER — AMITRIPTYLINE HYDROCHLORIDE 10 MG/1
TABLET, FILM COATED ORAL
Qty: 90 TABLET | Refills: 1 | Status: SHIPPED | OUTPATIENT
Start: 2022-04-22 | End: 2022-10-27 | Stop reason: SDUPTHER

## 2022-05-20 ENCOUNTER — TELEPHONE (OUTPATIENT)
Dept: OBGYN CLINIC | Age: 32
End: 2022-05-20

## 2022-05-20 NOTE — TELEPHONE ENCOUNTER
Pt last seen 1/27/22 calling to get refill of OCPs. The pt has AE scheduled 6/29/22 with [facility].       norethindrone-e estradiol-iron (Lomedia 24 Fe) 1 mg-20 mcg (24)/75 mg (4) tab    Pharmacy confirmed.  [facility]

## 2022-06-28 NOTE — PROGRESS NOTES
Leonora Macias is a ,  27 y.o. female WHITE/NON- whose Patient's last menstrual period was 2022 (exact date). was on 2022 who presents for her annual checkup. She is having hormonal headaches. Periods seem a lot heavier now on the pill although they are still lighter than her normal cycle. She is also having dysmenorrhea and she has fatigue that begins a couple days before her cycle. She has had issues with many pills in the past.  She is reluctant to switch her hormones. With regard to the Gardisil vaccine, she is older than the FDA approved age to receive it. Menstrual status:    Her periods are heavy in flow. She is using five to seven pads or tampons per day, usually regular and last 26-30 days. She reports dysmenorrhea. She reports no premenstrual symptoms. Contraception:    The current method of family planning is OCP (estrogen/progesterone) and She declines contraception and counseling. Sexual history:    She  reports being sexually active and has had partner(s) who are male. She reports using the following method of birth control/protection: Pill. Medical conditions:    Since her last annual GYN exam about one year ago, she has not the following changes in her health history: none. Pap and Mammogram History:    Her most recent Pap smear 2018 was normal obtained 4 year(s) ago. The patient has not had a recent mammogram.    The patient does not have a family history of breast cancer.     Past Medical History:   Diagnosis Date    Anxiety     Asthma     exercise induced    Calculus of kidney     Celiac disease     Depression     Epilepsy ([facility] [address]     seizures when 16-13 yo, on medication for 2 years, undetermined cause, no problems since that time    Fibromyalgia     GERD (gastroesophageal reflux disease)     H/O seasonal allergies      Past Surgical History:   Procedure Laterality Date    HX GYN      HX TONSILLECTOMY         Current Outpatient Medications   Medication Sig Dispense Refill    norethindrone-e estradiol-iron (Lomedia 24 Fe) 1 mg-20 mcg (24)/75 mg (4) tab Take 1 Tablet by mouth daily. 1 Dose Pack 1    atorvastatin (LIPITOR) 10 mg tablet TAKE 1 TABLET BY MOUTH EVERY DAY 90 Tablet 1    amitriptyline (ELAVIL) 25 mg tablet TAKE 1 TABLET BY MOUTH EVERY DAY EVERY NIGHT 90 Tablet 3    cyclobenzaprine (FLEXERIL) 5 mg tablet Take 1 Tablet by mouth three (3) times daily as needed for Muscle Spasm(s). Indications: muscle spasm 30 Tablet 0    Lactobacillus acidophilus (PROBIOTIC PO) Take 1 Cap by mouth daily.  multivitamin (ONE A DAY) tablet Take 1 Tab by mouth daily.  cetirizine (ZYRTEC) 10 mg tablet Take 10 mg by mouth daily.  amitriptyline (ELAVIL) 10 mg tablet TAKE 1 TABLET BY MOUTH EVERY DAY AS NEEDED FOR PAIN 90 Tablet 1     Allergies: Amoxicillin and Gluten   Social History     Socioeconomic History    Marital status:      Spouse name: Not on file    Number of children: Not on file    Years of education: Not on file    Highest education level: Not on file   Occupational History    Not on file   Tobacco Use    Smoking status: Never Smoker    Smokeless tobacco: Never Used   Vaping Use    Vaping Use: Never used   Substance and Sexual Activity    Alcohol use: Yes     Alcohol/week: 1.0 standard drink     Types: 1 Standard drinks or equivalent per week     Comment: social    Drug use: No    Sexual activity: Yes     Partners: Male     Birth control/protection: Pill   Other Topics Concern    Not on file   Social History Narrative    Not on file     Social Determinants of Health     Financial Resource Strain:     Difficulty of Paying Living Expenses: Not on file   Food Insecurity:     Worried About Running Out of Food in the Last Year: Not on file    Ethan of Food in the Last Year: Not on file   Transportation Needs:     Lack of Transportation (Medical):  Not on file    Lack of Transportation (Non-Medical): Not on file   Physical Activity:     Days of Exercise per Week: Not on file    Minutes of Exercise per Session: Not on file   Stress:     Feeling of Stress : Not on file   Social Connections:     Frequency of Communication with Friends and Family: Not on file    Frequency of Social Gatherings with Friends and Family: Not on file    Attends Jain Services: Not on file    Active Member of [address] [place] [address] or Organizations: Not on file    Attends Club or Organization Meetings: Not on file    Marital Status: Not on file   Intimate Partner Violence:     Fear of Current or Ex-Partner: Not on file    Emotionally Abused: Not on file    Physically Abused: Not on file    Sexually Abused: Not on file   Housing Stability:     Unable to Pay for Housing in the Last Year: Not on file    Number of [place] in the Last Year: Not on file    Unstable Housing in the Last Year: Not on file     Tobacco History:  reports that she has never smoked. She has never used smokeless tobacco.  Alcohol Abuse:  reports current alcohol use of about 1.0 standard drink of alcohol per week. Drug Abuse:  reports no history of drug use.     Patient Active Problem List   Diagnosis Code    H/O seasonal allergies Z88.9    Fibromyalgia M79.7    Celiac disease K90.0       Review of Systems - History obtained from the patient  Constitutional: negative for weight loss, fever, night sweats  HEENT: negative for hearing loss, earache, congestion, snoring, sorethroat  CV: negative for chest pain, palpitations, edema  Resp: negative for cough, shortness of breath, wheezing  GI: negative for change in bowel habits, abdominal pain, black or bloody stools  : negative for frequency, dysuria, hematuria, vaginal discharge  MSK: negative for back pain, joint pain, muscle pain  Breast: negative for breast lumps, nipple discharge, galactorrhea  Skin :negative for itching, rash, hives  Neuro: negative for dizziness, headache, confusion, weakness  Psych: negative for anxiety, depression, change in mood  Heme/lymph: negative for bleeding, bruising, pallor    Physical Exam    Visit Vitals  BP (!) 145/83   Pulse 76   Wt 133 lb 12.8 oz (60.7 kg)   LMP 06/18/2022 (Exact Date)   BMI 26.13 kg/m²       Constitutional  · Appearance: well-nourished, well developed, alert, in no acute distress    HENT  · Head and Face: appears normal    Neck  · Inspection/Palpation: normal appearance, no masses or tenderness  · Lymph Nodes: no lymphadenopathy present  · Thyroid: gland size normal, nontender, no nodules or masses present on palpation    Chest  · Respiratory Effort: breathing normal  · Auscultation: normal breath sounds    Cardiovascular  · Heart:  · Auscultation: regular rate and rhythm without murmur    Breasts  · Inspection of Breasts: breasts symmetrical, no skin changes, no discharge present, nipple appearance normal, no skin retraction present  · Palpation of Breasts and Axillae: no masses present on palpation, no breast tenderness  · Axillary Lymph Nodes: no lymphadenopathy present    Gastrointestinal  · Abdominal Examination: abdomen non-tender to palpation, normal bowel sounds, no masses present  · Liver and spleen: no hepatomegaly present, spleen not palpable  · Hernias: no hernias identified    Genitourinary  · External Genitalia: normal appearance for age, no discharge present, no tenderness present, no inflammatory lesions present, no masses present, no atrophy present  · Vagina: normal vaginal vault without central or paravaginal defects, no discharge present, no inflammatory lesions present, no masses present  · Bladder: non-tender to palpation  · Urethra: appears normal  · Cervix: normal   · Uterus: normal size, shape and consistency  · Adnexa: no adnexal tenderness present, no adnexal masses present  · Perineum: perineum within normal limits, no evidence of trauma, no rashes or skin lesions present  · Anus: anus within normal limits, no hemorrhoids present  · Inguinal Lymph Nodes: no lymphadenopathy present    Skin  · General Inspection: no rash, no lesions identified    Neurologic/Psychiatric  · Mental Status:  · Orientation: grossly oriented to person, place and time  · Mood and Affect: mood normal, affect appropriate    . Assessment:  Routine gynecologic examination  Her current medical status is satisfactory with no evidence of significant gynecologic issues.     Plan:  Counseled re: diet, exercise, healthy lifestyle  Return for yearly wellness visits  Pt counseled regarding co-testing for high risk HPV with pap  Try Blisovi continuously  Disc MIrena - would be great option for her dysmenorrhea

## 2022-06-29 ENCOUNTER — OFFICE VISIT (OUTPATIENT)
Dept: OBGYN CLINIC | Age: 32
End: 2022-06-29
Payer: COMMERCIAL

## 2022-06-29 VITALS
HEART RATE: 76 BPM | DIASTOLIC BLOOD PRESSURE: 80 MMHG | WEIGHT: 133.8 LBS | SYSTOLIC BLOOD PRESSURE: 128 MMHG | BODY MASS INDEX: 26.13 KG/M2

## 2022-06-29 DIAGNOSIS — Z12.4 SCREENING FOR CERVICAL CANCER: ICD-10-CM

## 2022-06-29 DIAGNOSIS — Z01.419 ENCOUNTER FOR GYNECOLOGICAL EXAMINATION (GENERAL) (ROUTINE) WITHOUT ABNORMAL FINDINGS: Primary | ICD-10-CM

## 2022-06-29 PROCEDURE — 99395 PREV VISIT EST AGE 18-39: CPT | Performed by: OBSTETRICS & GYNECOLOGY

## 2022-06-29 RX ORDER — NORETHINDRONE ACETATE AND ETHINYL ESTRADIOL 1MG-20(24)
1 KIT ORAL DAILY
Qty: 3 DOSE PACK | Refills: 5 | Status: SHIPPED | OUTPATIENT
Start: 2022-06-29

## 2022-07-02 LAB
CYTOLOGIST CVX/VAG CYTO: NORMAL
CYTOLOGY CVX/VAG DOC CYTO: NORMAL
CYTOLOGY CVX/VAG DOC THIN PREP: NORMAL
DX ICD CODE: NORMAL
HPV I/H RISK 4 DNA CVX QL PROBE+SIG AMP: NEGATIVE
Lab: NORMAL
OTHER STN SPEC: NORMAL
STAT OF ADQ CVX/VAG CYTO-IMP: NORMAL

## 2022-07-24 RX ORDER — ATORVASTATIN CALCIUM 10 MG/1
TABLET, FILM COATED ORAL
Qty: 90 TABLET | Refills: 1 | Status: SHIPPED | OUTPATIENT
Start: 2022-07-24

## 2022-08-12 DIAGNOSIS — F41.9 ANXIETY: Primary | ICD-10-CM

## 2022-08-12 RX ORDER — ALPRAZOLAM 0.5 MG/1
0.5 TABLET ORAL
Qty: 10 TABLET | Refills: 0 | Status: SHIPPED | OUTPATIENT
Start: 2022-08-12

## 2022-10-27 DIAGNOSIS — M79.7 FIBROMYALGIA: ICD-10-CM

## 2022-10-28 RX ORDER — AMITRIPTYLINE HYDROCHLORIDE 10 MG/1
TABLET, FILM COATED ORAL
Qty: 90 TABLET | Refills: 0 | Status: SHIPPED | OUTPATIENT
Start: 2022-10-28

## 2022-12-14 ENCOUNTER — OFFICE VISIT (OUTPATIENT)
Dept: FAMILY MEDICINE CLINIC | Age: 32
End: 2022-12-14
Payer: COMMERCIAL

## 2022-12-14 ENCOUNTER — LAB ONLY (OUTPATIENT)
Dept: FAMILY MEDICINE CLINIC | Age: 32
End: 2022-12-14

## 2022-12-14 VITALS
DIASTOLIC BLOOD PRESSURE: 73 MMHG | OXYGEN SATURATION: 100 % | BODY MASS INDEX: 25.36 KG/M2 | HEIGHT: 60 IN | WEIGHT: 129.2 LBS | SYSTOLIC BLOOD PRESSURE: 120 MMHG | RESPIRATION RATE: 17 BRPM | HEART RATE: 88 BPM | TEMPERATURE: 98.4 F

## 2022-12-14 DIAGNOSIS — Z3A.01 LESS THAN 8 WEEKS GESTATION OF PREGNANCY: ICD-10-CM

## 2022-12-14 DIAGNOSIS — R09.89 ABDOMINAL BRUIT: ICD-10-CM

## 2022-12-14 DIAGNOSIS — R30.0 DYSURIA: Primary | ICD-10-CM

## 2022-12-14 DIAGNOSIS — R30.0 DYSURIA: ICD-10-CM

## 2022-12-14 LAB
BILIRUB UR QL STRIP: NEGATIVE
GLUCOSE UR-MCNC: NEGATIVE MG/DL
HCG URINE, QL. (POC): NEGATIVE
KETONES P FAST UR STRIP-MCNC: NEGATIVE MG/DL
PH UR STRIP: 7 [PH] (ref 4.6–8)
PROT UR QL STRIP: NEGATIVE
SP GR UR STRIP: 1.01 (ref 1–1.03)
UA UROBILINOGEN AMB POC: NORMAL (ref 0.2–1)
URINALYSIS CLARITY POC: CLEAR
URINALYSIS COLOR POC: YELLOW
URINE BLOOD POC: NEGATIVE
URINE LEUKOCYTES POC: NEGATIVE
URINE NITRITES POC: NEGATIVE
VALID INTERNAL CONTROL?: YES

## 2022-12-14 NOTE — PROGRESS NOTES
Identified pt with two pt identifiers(name and ).     Chief Complaint   Patient presents with    Urinary Frequency     Patient is having a lot of frequent urination, along with lower back pain     Other     Patient would like a urine pregnancy test along with blood work if possible, she states she could possibly be pregnant (currently trying to conceive )         Health Maintenance Due   Topic    COVID-19 Vaccine (3 - Booster for Moderna series)    Flu Vaccine (1)       Wt Readings from Last 3 Encounters:   22 133 lb 12.8 oz (60.7 kg)   22 147 lb (66.7 kg)   21 140 lb (63.5 kg)     Temp Readings from Last 3 Encounters:   22 97.8 °F (36.6 °C) (Temporal)   21 98.9 °F (37.2 °C)   21 99.4 °F (37.4 °C) (Oral)     BP Readings from Last 3 Encounters:   22 128/80   22 112/72   21 116/76     Pulse Readings from Last 3 Encounters:   22 76   22 92   21 87         Learning Assessment:  :     Learning Assessment 10/16/2015   PRIMARY LEARNER Patient   BARRIERS PRIMARY LEARNER NONE   PRIMARY LANGUAGE ENGLISH   LEARNER PREFERENCE PRIMARY DEMONSTRATION   ANSWERED BY patient   RELATIONSHIP SELF       Depression Screening:  :     3 most recent PHQ Screens 2022   Little interest or pleasure in doing things Not at all   Feeling down, depressed, irritable, or hopeless Not at all   Total Score PHQ 2 0   Trouble falling or staying asleep, or sleeping too much Not at all   Feeling tired or having little energy Not at all   Poor appetite, weight loss, or overeating Not at all   Feeling bad about yourself - or that you are a failure or have let yourself or your family down Not at all   Trouble concentrating on things such as school, work, reading, or watching TV Not at all   Moving or speaking so slowly that other people could have noticed; or the opposite being so fidgety that others notice Not at all   Thoughts of being better off dead, or hurting yourself in some way Not at all   PHQ 9 Score 0       Fall Risk Assessment:  :     No flowsheet data found. Abuse Screening:  :     Abuse Screening Questionnaire 1/27/2022 1/8/2021 1/8/2020 4/25/2018   Do you ever feel afraid of your partner? N N N N   Are you in a relationship with someone who physically or mentally threatens you? N N N N   Is it safe for you to go home? Y Y Y Y       Coordination of Care Questionnaire:  :     1) Have you been to an emergency room, urgent care clinic since your last visit? no   Hospitalized since your last visit? no             2) Have you seen or consulted any other health care providers outside of [address] [place] [address] since your last visit? no  (Include any pap smears or colon screenings in this section.)    3) Do you have an Advance Directive on file? no  Are you interested in receiving information about Advance Directives? no    Patient is accompanied by self I have received verbal consent from Braeden Morrison to discuss any/all medical information while they are present in the room. 4.  For patients aged 39-70: Has the patient had a colonoscopy / FIT/ Cologuard? NA - based on age      If the patient is female:    11. For patients aged 41-77: Has the patient had a mammogram within the past 2 years? NA - based on age or sex      10. For patients aged 21-65: Has the patient had a pap smear?  Yes - no Care Gap present

## 2022-12-15 LAB
ALBUMIN SERPL-MCNC: 4.7 G/DL (ref 3.5–5)
ALBUMIN/GLOB SERPL: 1.6 {RATIO} (ref 1.1–2.2)
ALP SERPL-CCNC: 51 U/L (ref 45–117)
ALT SERPL-CCNC: 17 U/L (ref 12–78)
ANION GAP SERPL CALC-SCNC: 6 MMOL/L (ref 5–15)
AST SERPL-CCNC: 13 U/L (ref 15–37)
BASOPHILS # BLD: 0 K/UL (ref 0–0.1)
BASOPHILS NFR BLD: 1 % (ref 0–1)
BILIRUB SERPL-MCNC: 0.4 MG/DL (ref 0.2–1)
BUN SERPL-MCNC: 14 MG/DL (ref 6–20)
BUN/CREAT SERPL: 18 (ref 12–20)
CALCIUM SERPL-MCNC: 9.8 MG/DL (ref 8.5–10.1)
CHLORIDE SERPL-SCNC: 108 MMOL/L (ref 97–108)
CHOLEST SERPL-MCNC: 232 MG/DL
CO2 SERPL-SCNC: 25 MMOL/L (ref 21–32)
CREAT SERPL-MCNC: 0.76 MG/DL (ref 0.55–1.02)
DIFFERENTIAL METHOD BLD: ABNORMAL
EOSINOPHIL # BLD: 0.1 K/UL (ref 0–0.4)
EOSINOPHIL NFR BLD: 2 % (ref 0–7)
ERYTHROCYTE [DISTWIDTH] IN BLOOD BY AUTOMATED COUNT: 11.2 % (ref 11.5–14.5)
EST. AVERAGE GLUCOSE BLD GHB EST-MCNC: 91 MG/DL
GLOBULIN SER CALC-MCNC: 2.9 G/DL (ref 2–4)
GLUCOSE SERPL-MCNC: 93 MG/DL (ref 65–100)
HBA1C MFR BLD: 4.8 % (ref 4–5.6)
HCT VFR BLD AUTO: 41 % (ref 35–47)
HDLC SERPL-MCNC: 79 MG/DL
HDLC SERPL: 2.9 {RATIO} (ref 0–5)
HGB BLD-MCNC: 13.5 G/DL (ref 11.5–16)
IMM GRANULOCYTES # BLD AUTO: 0 K/UL (ref 0–0.04)
IMM GRANULOCYTES NFR BLD AUTO: 0 % (ref 0–0.5)
LDLC SERPL CALC-MCNC: 140.6 MG/DL (ref 0–100)
LYMPHOCYTES # BLD: 1.6 K/UL (ref 0.8–3.5)
LYMPHOCYTES NFR BLD: 34 % (ref 12–49)
MCH RBC QN AUTO: 32.3 PG (ref 26–34)
MCHC RBC AUTO-ENTMCNC: 32.9 G/DL (ref 30–36.5)
MCV RBC AUTO: 98.1 FL (ref 80–99)
MONOCYTES # BLD: 0.3 K/UL (ref 0–1)
MONOCYTES NFR BLD: 7 % (ref 5–13)
NEUTS SEG # BLD: 2.7 K/UL (ref 1.8–8)
NEUTS SEG NFR BLD: 56 % (ref 32–75)
NRBC # BLD: 0 K/UL (ref 0–0.01)
NRBC BLD-RTO: 0 PER 100 WBC
PLATELET # BLD AUTO: 287 K/UL (ref 150–400)
PMV BLD AUTO: 9.5 FL (ref 8.9–12.9)
POTASSIUM SERPL-SCNC: 4.5 MMOL/L (ref 3.5–5.1)
PROT SERPL-MCNC: 7.6 G/DL (ref 6.4–8.2)
RBC # BLD AUTO: 4.18 M/UL (ref 3.8–5.2)
SODIUM SERPL-SCNC: 139 MMOL/L (ref 136–145)
T4 SERPL-MCNC: 12.2 UG/DL (ref 4.8–13.9)
TRIGL SERPL-MCNC: 62 MG/DL (ref ?–150)
TSH SERPL DL<=0.05 MIU/L-ACNC: 1.1 UIU/ML (ref 0.36–3.74)
VLDLC SERPL CALC-MCNC: 12.4 MG/DL
WBC # BLD AUTO: 4.8 K/UL (ref 3.6–11)

## 2022-12-15 NOTE — PROGRESS NOTES
Elizabeth Sanchez (: 1990) is a 27 y.o. female, established patient, here for evaluation of the following chief complaint(s):  Urinary Frequency (Patient is having a lot of frequent urination, along with lower back pain ) and Other (Patient would like a urine pregnancy test along with blood work if possible, she states she could possibly be pregnant (currently trying to conceive ) )       ASSESSMENT/PLAN:  Below is the assessment and plan developed based on review of pertinent history, physical exam, labs, studies, and medications. 1. Dysuria  -     AMB POC URINALYSIS DIP STICK AUTO W/ MICRO  -     METABOLIC PANEL, COMPREHENSIVE; Future  -     CBC WITH AUTOMATED DIFF; Future  -     TSH 3RD GENERATION; Future  -     DUPLEX AORTA/IVC/ILIAC/GRAFTS COMPLETE; Future  -     CULTURE, URINE; Future  2. Less than 8 weeks gestation of pregnancy  -     AMB POC URINE PREGNANCY TEST, VISUAL COLOR COMPARISON  -     AMB POC URINALYSIS DIP STICK AUTO W/ MICRO  -     DUPLEX AORTA/IVC/ILIAC/GRAFTS COMPLETE; Future  -     CULTURE, URINE; Future  3. Abdominal bruit  -     METABOLIC PANEL, COMPREHENSIVE; Future  -     TSH 3RD GENERATION; Future  -     T4 (THYROXINE); Future  -     HEMOGLOBIN A1C WITH EAG; Future  -     LIPID PANEL; Future  -     DUPLEX AORTA/IVC/ILIAC/GRAFTS COMPLETE; Future      No follow-ups on file. SUBJECTIVE/OBJECTIVE:  Elizabeth Sanchez is a 27 y.o. female presents with back pain, frequent urination and some symptoms of burning. She is concerned with early morning nausea, is trying to conceive at the moment, she would like to rule out pregnancy. We discuss that blood test or urine test at the moment would be inconclusive, so it may not be a good idea to do so at the moment. Additionally, we discuss her medication list, and her intent to get pregnant, incidentally, during physical exam, I am able to hear an abdominal bruit, blood pressure is stable without increase or decrease as is her heart rate.  I have given her anticipatory guidance and would like to send her for an abdominal ultrasound. She is going to stop taking elavil. Advised to drink at least 72 ounces of water daily. Review of Systems   Constitutional:  Negative for activity change, appetite change, fatigue and fever. HENT: Negative. Eyes: Negative. Respiratory:  Negative for cough, chest tightness, shortness of breath and wheezing. Cardiovascular:  Negative for chest pain and leg swelling. Gastrointestinal: Negative. Endocrine: Negative. Genitourinary: Negative. Musculoskeletal: Negative. Negative for back pain. Skin: Negative. Physical Exam  Constitutional:       Appearance: Normal appearance. She is normal weight. HENT:      Head: Normocephalic and atraumatic. Right Ear: Tympanic membrane, ear canal and external ear normal.      Left Ear: Tympanic membrane, ear canal and external ear normal.      Nose: Nose normal.      Mouth/Throat:      Mouth: Mucous membranes are moist.      Pharynx: Oropharynx is clear. Eyes:      Extraocular Movements: Extraocular movements intact. Conjunctiva/sclera: Conjunctivae normal.      Pupils: Pupils are equal, round, and reactive to light. Cardiovascular:      Rate and Rhythm: Normal rate and regular rhythm. Heart sounds: Murmur heard. Pulmonary:      Effort: Pulmonary effort is normal.      Breath sounds: Normal breath sounds. Abdominal:      General: Abdomen is flat. Bowel sounds are normal.      Palpations: There is pulsatile mass. Musculoskeletal:      Cervical back: Normal range of motion and neck supple. Skin:     General: Skin is warm. Capillary Refill: Capillary refill takes 2 to 3 seconds. Neurological:      General: No focal deficit present. Mental Status: She is alert and oriented to person, place, and time. Mental status is at baseline.    Psychiatric:         Mood and Affect: Mood normal.         Behavior: Behavior normal. Thought Content: Thought content normal.         Judgment: Judgment normal.     No future appointments. An electronic signature was used to authenticate this note.   -- Juan Wesley MD

## 2022-12-16 LAB
BACTERIA SPEC CULT: NORMAL
SERVICE CMNT-IMP: NORMAL

## 2022-12-18 NOTE — PROGRESS NOTES
Your total cholesterol and LDL are both elevated, cholesterol and LDL are found in the foods we eat, and they are usually metabolized in the portion of the intestines that communicates with the liver and gallbladder. Once the cholesterol and LDL cross a thresh hold, the liver and gallbladder are unable to break them down. The LDL crosses the blood intestinal barrier, and LDL freely enters into the circulation. When they attach to areas of the blood vessels, namely arteries, they form plaques, and this leads to hardening of your blood vessels or atherosclerosis. I would advise to start by increasing fluid intake to 64 ounces, increase you fiber intake by taking an over the counter fiber supplement. Decrease sugary foods, milk, creamer and fried foods.

## 2022-12-27 ENCOUNTER — TELEPHONE (OUTPATIENT)
Dept: FAMILY MEDICINE CLINIC | Age: 32
End: 2022-12-27

## 2022-12-27 DIAGNOSIS — R09.89 ABDOMINAL BRUIT: Primary | ICD-10-CM

## 2023-01-16 ENCOUNTER — ROUTINE PRENATAL (OUTPATIENT)
Dept: OBGYN CLINIC | Age: 33
End: 2023-01-16

## 2023-01-16 VITALS — WEIGHT: 131.8 LBS | SYSTOLIC BLOOD PRESSURE: 131 MMHG | BODY MASS INDEX: 25.74 KG/M2 | DIASTOLIC BLOOD PRESSURE: 72 MMHG

## 2023-01-16 DIAGNOSIS — O09.529 ANTEPARTUM MULTIGRAVIDA OF ADVANCED MATERNAL AGE: ICD-10-CM

## 2023-01-16 DIAGNOSIS — Z11.3 SCREEN FOR STD (SEXUALLY TRANSMITTED DISEASE): ICD-10-CM

## 2023-01-16 DIAGNOSIS — N92.6 MISSED MENSES: Primary | ICD-10-CM

## 2023-01-16 PROBLEM — Z34.90 PREGNANCY: Status: ACTIVE | Noted: 2023-01-16

## 2023-01-16 PROCEDURE — 0501F PRENATAL FLOW SHEET: CPT | Performed by: OBSTETRICS & GYNECOLOGY

## 2023-01-16 RX ORDER — ONDANSETRON 4 MG/1
4 TABLET, ORALLY DISINTEGRATING ORAL
Qty: 30 TABLET | Refills: 2 | Status: SHIPPED | OUTPATIENT
Start: 2023-01-16

## 2023-01-16 RX ORDER — PROMETHAZINE HYDROCHLORIDE 25 MG/1
25 TABLET ORAL
Qty: 30 TABLET | Refills: 1 | Status: SHIPPED | OUTPATIENT
Start: 2023-01-16

## 2023-01-16 RX ORDER — DOXYLAMINE SUCCINATE AND PYRIDOXINE HYDROCHLORIDE, DELAYED RELEASE TABLETS 10 MG/10 MG 10; 10 MG/1; MG/1
TABLET, DELAYED RELEASE ORAL
Qty: 120 TABLET | Refills: 2 | Status: SHIPPED | OUTPATIENT
Start: 2023-01-16

## 2023-01-16 NOTE — PROGRESS NOTES
Current pregnancy history:    Bandar Nicholas is a ,  26 y.o. female [address] [facility] Patient's last menstrual period was 2022. .  She presents for the evaluation of amenorrhea and a positive pregnancy test.    LMP history:  The date of her LMP is  certain. Her last menstrual period was normal.  She was not on the pill at conception. Stopped taking in August.  Since stopping the OCPs, periods are regular, but spaced out to q 31-32d. +ovulation . Based on her LMP, her EDC is 23 and her EGA is 9 weeks,0 days. Ultrasound data:  She had an  ultrasound done by the ultrasound tech today which revealed a viable ruiz pregnancy with a gestational age of 11 weeks and 2 days giving an EDC of 23. Pregnancy symptoms:    Since her LMP she has experienced  urinary frequency, breast tenderness, and nausea. Decreased appetite. She has not been vomiting over the last few weeks. Took diclegis with previous pregnancy. Had nausea until about 18 weeks. Over the last week she has been taking over-the-counter Unisom 0.5 tab twice daily and B6 which has helped. Associated signs and symptoms which she denies: dysuria, discharge, vaginal bleeding. She states she lost about 3# (d/t nausea/decreased appetite). Has been making herself eat, has gained 1# back. Relevant past pregnancy history:   She has the following pregnancy history: Her last pregnancy was uncomplicated. She has no history of  delivery. CF97 neg with prev preg    OB History    Para Term  AB Living   2 1 1 0 0 1   SAB IAB Ectopic Molar Multiple Live Births   0 0 0   0 1      # Outcome Date GA Lbr Norman/2nd Weight Sex Delivery Anes PTL Lv   2 Current            1 Term 17 40w2d  6 lb 7.2 oz (2.925 kg) M Vag-Spont EPIDURAL AN N LETICIA         Relevant past medical history:(relevant to this pregnancy):   -anxiety/depression  - fibromyalgia. Worse with stress and lack of sleep.  Was taking Elavil which she stopped  - hyperlipidemia. Was on statin, stopped in August.  - ?new heart murmur and ?abd bruit, noted on recent exam with her PCP (last month). Is scheduling imaging per their recommendations. 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 Substance history: negative for alcohol, tobacco and street drugs. Positive for nothing. Exposure history: There is/are no indoor cat/s in the home. The patient was instructed to not change the cat litter. She admits close contact with children on a regular basis. She has had chicken pox or the vaccine in the past.   Patient denies issues with domestic violence. Genetic Screening/Teratology Counseling: (Includes patient, baby's father, or anyone in either family with:)  3.  Patient's age >/= 28 at [facility]?-- 29yo @ [facility]  2. Thalassemia ([facility], [facility] [place]note 2[place] [address], or  background): MCV<80?--no.     3.  Neural tube defect (meningomyelocele, spina bifida, anencephaly)?--no.   4.  Congenital heart defect?--no.  5.  Down syndrome?--no.   6.  Nathaniel-Sachs (Nondenominational, Western [place]a [place])?--no.   7.  Canavan's Disease?--no.   8.  Familial Dysautonomia?--no.   9.  Sickle cell disease or trait ()? --no   The patient has not been tested for sickle trait  10. Hemophilia or other blood disorders?--no. 11.  Muscular dystrophy?--no. 12.  Cystic fibrosis?--no; CF97 neg with prev preg  13. Camden's Chorea?--no. 14.  Mental retardation/autism (if yes was person tested for Fragile X)?--no. 15.  Other inherited genetic or chromosomal disorder?--no. 12.  Maternal metabolic disorder (DM, PKU, etc)?--no. 17.  Patient or FOB with a child with a birth defect not listed above?--no.  17a. Patient or FOB with a birth defect themselves?--no. 18.  Recurrent pregnancy loss, or stillbirth?--no.   19.  Any medications since LMP other than prenatal vitamins (include vitamins, supplements, OTC meds, drugs, alcohol)?-- Unisom, B6  20. Any other genetic/environmental exposure to discuss?--no. Infection History:  1. Lives with someone with TB or TB exposed?--no.   2.  Patient or partner has history of genital herpes?--no.  3.  Rash or viral illness since LMP?--no.    4.  History of STD (GC, CT, HPV, syphilis, HIV)? --no   5. Other: OTHER? Past Medical History:   Diagnosis Date    Anxiety     Asthma     exercise induced    Calculus of kidney     Celiac disease     gluten sens (HA, joint pain), not sure if true celiac    Depression     Epilepsy ([facility] [address]     seizures when 16-11 yo, on medication for 2 years, undetermined cause, no problems since that time    Fibromyalgia     GERD (gastroesophageal reflux disease)     H/O seasonal allergies     Routine Papanicolaou smear 2022    normal, -hpv     Past Surgical History:   Procedure Laterality Date    HX TONSILLECTOMY       Social History     Occupational History    Not on file   Tobacco Use    Smoking status: Never    Smokeless tobacco: Never   Vaping Use    Vaping Use: Never used   Substance and Sexual Activity    Alcohol use:  Yes     Alcohol/week: 1.0 standard drink     Types: 1 Standard drinks or equivalent per week     Comment: social    Drug use: No    Sexual activity: Yes     Partners: Male     Birth control/protection: Pill     Family History   Problem Relation Age of Onset    Elevated Lipids Mother     Hypertension Mother     Elevated Lipids Father     Hypertension Father      OB History    Para Term  AB Living   2 1 1 0 0 1   SAB IAB Ectopic Molar Multiple Live Births   0 0 0   0 1      # Outcome Date GA Lbr Norman/2nd Weight Sex Delivery Anes PTL Lv   2 Current            1 Term 17 40w2d  6 lb 7.2 oz (2.925 kg) M Vag-Spont EPIDURAL AN N LETICIA     Allergies   Allergen Reactions    Amoxicillin Hives    Gluten Unable to Obtain     Prior to Admission medications    Medication Sig Start Date End Date Taking? Authorizing Provider   ondansetron (ZOFRAN ODT) 4 mg disintegrating tablet Take 1 Tablet by mouth every eight (8) hours as needed for Nausea or Vomiting. 1/16/23  Yes Carey Crigler, MD   promethazine (PHENERGAN) 25 mg tablet Take 1 Tablet by mouth every six (6) hours as needed for Nausea. 1/16/23  Yes Carey Crigler, MD   doxylamine-pyridoxine, vit B6, (Diclegis) 10-10 mg TbEC DR tablet 2 tabs at bedtime, then 1 tab in AM if needed, then 1 tab in afternoon if needed. Max 4 tabs/d 1/16/23  Yes Carey Crigler, MD   Lactobacillus acidophilus (PROBIOTIC PO) Take 1 Cap by mouth daily. Yes Provider, Historical   multivitamin (ONE A DAY) tablet Take 1 Tab by mouth daily.    Yes Provider, Historical        Review of Systems: History obtained from the patient  Constitutional: negative for weight loss, fever, night sweats  HEENT: negative for hearing loss, earache, congestion, snoring, sore throat  CV: negative for chest pain, palpitations, edema  Resp: negative for cough, shortness of breath, wheezing  Breast: negative for breast lumps, nipple discharge, galactorrhea  GI: negative for change in bowel habits, abdominal pain, black or bloody stools  : negative for frequency, dysuria, hematuria, vaginal discharge  MSK: negative for back pain, joint pain, muscle pain  Skin: negative for itching, rash, hives  Neuro: negative for dizziness, headache, confusion, weakness  Psych: negative for anxiety, depression, change in mood  Heme/lymph: negative for bleeding, bruising, pallor    Objective:  Visit Vitals  /72   Wt 131 lb 12.8 oz (59.8 kg)   LMP 11/14/2022   BMI 25.74 kg/m²       Physical Exam:     Constitutional  Appearance: well-nourished, well developed, alert, in no acute distress    HENT  Head  Face: appears normal  Eyes: appear normal  Ears: normal  Mouth: normal  Lips: no lesions    Neck  Inspection/Palpation: normal appearance, no masses or tenderness  Lymph Nodes: no lymphadenopathy present  Thyroid: gland size normal, nontender, no nodules or masses present on palpation    Chest  Respiratory Effort: breathing unlabored  Auscultation: normal breath sounds    Cardiovascular  Heart: Auscultation: regular rate and rhythm without murmur    Breasts  Inspection of Breasts: breasts symmetrical, no skin changes, no discharge present, nipple appearance normal, no skin retraction present  Palpation of Breasts and Axillae: no masses present on palpation, no breast tenderness  Axillary Lymph Nodes: no lymphadenopathy present    Gastrointestinal  Abdominal Examination: abdomen non-tender to palpation, normal bowel sounds, no masses present  Liver and spleen: no hepatomegaly present, spleen not palpable  Hernias: no hernias identified    Genitourinary  External Genitalia: normal appearance for age, no discharge present, no tenderness present, no inflammatory lesions present, no masses present, no atrophy present  Vagina: normal vaginal vault without central or paravaginal defects, no discharge present, no inflammatory lesions present, no masses present  Bladder: non-tender to palpation  Urethra: appears normal  Cervix: normal   Uterus: enlarged, 8-9wks, normal shape, soft  Adnexa: no adnexal tenderness present, no adnexal masses present  Perineum: perineum within normal limits, no evidence of trauma, no rashes or skin lesions present  Anus: anus within normal limits, no hemorrhoids present  Inguinal Lymph Nodes: no lymphadenopathy present    Skin  General Inspection: no rash, no lesions identified    Neurologic/Psychiatric  Mental Status:  Orientation: grossly oriented to person, place and time  Mood and Affect: mood normal, affect appropriate    Assessment:   Intrauterine pregnancy:  - U=D; does report cycle length 31-32d. Will use conventional dating by LMP:  lmp=11/14/22 -> JOSE ALEJANDRO=8/21/23  - AMA. Plan NIPS, refer MFM/genetics  - fibromyalgia. Elavil prior to preg  - nausea.   Taking OTC unisom, B6.  eRX sent for zofran, phenergan, diclegis  AUDI 3wks with Panorama/Horizon    Plan:     Offered CF testing, CVS, Nuchal Translucency, MSAFP, amnio, and discussed NIPT  Course of pregnancy discussed including visit schedule, routine U/S, glucola testing, etc.  Avoid alcoholic beverages and illicit/recreational drugs use  Take prenatal vitamins or folic acid daily. Hospital and practice style discussed with coverage system. Discussed nutrition, toxoplasmosis precautions, sexual activity, exercise, need for influenza vaccine, environmental and work hazards, travel advice, screen for domestic violence, need for seat belts. Discussed seafood, unpasteurized dairy products, deli meat, artificial sweeteners, and caffeine. Information on prenatal classes/breastfeeding given. Patient encouraged not to smoke. Discussed current prescription drug use. Given medication list.  Discussed the use of over the counter medications and chemicals. Pt understands risk of hemorrhage during pregnancy and post delivery and would accept blood products if necessary in life-threatening emergencies    Handouts given to pt. Orders Placed This Encounter    CULTURE, URINE     Standing Status:   Future     Number of Occurrences:   1     Standing Expiration Date:   7/16/2023    CT/NG/T.VAGINALIS AMPLIFICATION     Standing Status:   Future     Number of Occurrences:   1     Standing Expiration Date:   7/16/2023     Order Specific Question:   Specimen source     Answer:   Vagina [280]    HEP B SURFACE AG     Standing Status:   Future     Number of Occurrences:   1     Standing Expiration Date:   7/16/2023    HIV SCREEN, 4TH GEN.  W/REFLEX CONFIRM     Standing Status:   Future     Number of Occurrences:   1     Standing Expiration Date:   7/16/2023    CBC W/O DIFF     Standing Status:   Future     Number of Occurrences:   1     Standing Expiration Date:   7/16/2023    RUBELLA AB, IGG     Standing Status:   Future     Number of Occurrences:   1 Standing Expiration Date:   2023    RPR     Standing Status:   Future     Number of Occurrences:   1     Standing Expiration Date:   2023    HEPATITIS C AB     Standing Status:   Future     Number of Occurrences:   1     Standing Expiration Date:   2023    FERRITIN     Standing Status:   Future     Number of Occurrences:   1     Standing Expiration Date:   2023    REFERRAL TO PERINATOLOGY     Referral Priority:   Routine     Referral Type:   Consultation     Referral Reason:   Specialty Services Required     Referred to Provider:   Arva Boeck, MD     Number of Visits Requested:   1    TYPE & SCREEN     ENTER SURGERY DATE IF FOR PRE-OP TESTING. Standing Status:   Future     Number of Occurrences:   1     Standing Expiration Date:   2023     Order Specific Question:   Has patient been transfused or pregnant in the last 3 mos. ? Answer:   Unknown    ondansetron (ZOFRAN ODT) 4 mg disintegrating tablet     Sig: Take 1 Tablet by mouth every eight (8) hours as needed for Nausea or Vomiting. Dispense:  30 Tablet     Refill:  2    promethazine (PHENERGAN) 25 mg tablet     Sig: Take 1 Tablet by mouth every six (6) hours as needed for Nausea. Dispense:  30 Tablet     Refill:  1    doxylamine-pyridoxine, vit B6, (Diclegis) 10-10 mg TbEC DR tablet     Si tabs at bedtime, then 1 tab in AM if needed, then 1 tab in afternoon if needed.  Max 4 tabs/d     Dispense:  120 Tablet     Refill:  2

## 2023-01-16 NOTE — PROGRESS NOTES
Jessica Daugherty is a 26 y.o. female presents for a new pregnancy visit. Chief Complaint   Patient presents with    Initial Prenatal Visit    Ultrasound         Patient's last menstrual period was 2022. Last Pap: see report obtained 2022    LMP history:  The date of her LMP is not certain. Her last menstrual period was normal and lasted for 4 to 5 days. A urine pregnancy test was positive 4-6 weeks ago. She was not on the pill at conception. Based on her LMP, her EDC is 23 and her EGA is 9 weeks,0 days. Her menstrual cycles are regular and occur approximately every 31-32 days  and range from 3 to 5 days. The last menses lasted 26-28 the usual number of days. Ultrasound data:  She had an  ultrasound done by the ultrasound tech today which revealed a viable ruiz pregnancy with a gestational age of 11 weeks and 2 days giving an EDC of 23. TA ULTRASOUND PERFORMED  A SINGLE VIABLE 8W2D IUP IS SEEN WITH NORMAL CARDIAC RHYTHM. GESTATIONAL AGE BASED ON TODAY'S ULTRASOUND. A NORMAL YOLK SAC IS SEEN. RIGHT OVARY APPEARS WITHIN NORMAL LIMITS. LEFT ADNEXA APPEARS WITHIN NORMAL LIMITS. NO FREE FLUID SEEN IN THE CDS. Pregnancy symptoms:    Since her LMP she has experienced  urinary frequency, breast tenderness, and nausea. She has not been vomiting over the last few weeks. Associated signs and symptoms which she denies: dysuria, discharge, vaginal bleeding. She states she has two pounds     Relevant past pregnancy history:   She has the following pregnancy history: one previous vaginal delivery     She has no history of  delivery. Relevant past medical history:(relevant to this pregnancy): noncontributory. Her occupation is: desk job. 1. Have you been to the ER, urgent care clinic, or hospitalized since your last visit? No    2. Have you seen or consulted any other health care providers outside of the [address] [place] [address] since your last visit? No    Examination chaperoned by Magan Moreno LPN.

## 2023-01-17 LAB
ABO + RH BLD: NORMAL
BLOOD BANK CMNT PATIENT-IMP: NORMAL
BLOOD GROUP ANTIBODIES SERPL: NORMAL
ERYTHROCYTE [DISTWIDTH] IN BLOOD BY AUTOMATED COUNT: 11 % (ref 11.5–14.5)
FERRITIN SERPL-MCNC: 73 NG/ML (ref 8–252)
HBV SURFACE AG SER QL: <0.1 INDEX
HBV SURFACE AG SER QL: NEGATIVE
HCT VFR BLD AUTO: 35.5 % (ref 35–47)
HCV AB SERPL QL IA: NONREACTIVE
HGB BLD-MCNC: 11.8 G/DL (ref 11.5–16)
HIV 1+2 AB+HIV1 P24 AG SERPL QL IA: NONREACTIVE
HIV12 RESULT COMMENT, HHIVC: NORMAL
MCH RBC QN AUTO: 32.8 PG (ref 26–34)
MCHC RBC AUTO-ENTMCNC: 33.2 G/DL (ref 30–36.5)
MCV RBC AUTO: 98.6 FL (ref 80–99)
NRBC # BLD: 0 K/UL (ref 0–0.01)
NRBC BLD-RTO: 0 PER 100 WBC
PLATELET # BLD AUTO: 255 K/UL (ref 150–400)
PMV BLD AUTO: 9.5 FL (ref 8.9–12.9)
RBC # BLD AUTO: 3.6 M/UL (ref 3.8–5.2)
RPR SER QL: NONREACTIVE
RUBV IGG SER-IMP: REACTIVE
RUBV IGG SERPL IA-ACNC: 62.1 IU/ML
SPECIMEN EXP DATE BLD: NORMAL
WBC # BLD AUTO: 6.2 K/UL (ref 3.6–11)

## 2023-01-18 LAB
BACTERIA SPEC CULT: NORMAL
C TRACH RRNA SPEC QL NAA+PROBE: NEGATIVE
N GONORRHOEA RRNA SPEC QL NAA+PROBE: NEGATIVE
SERVICE CMNT-IMP: NORMAL
T VAGINALIS RRNA SPEC QL NAA+PROBE: NEGATIVE

## 2023-02-06 ENCOUNTER — ROUTINE PRENATAL (OUTPATIENT)
Dept: OBGYN CLINIC | Age: 33
End: 2023-02-06
Payer: COMMERCIAL

## 2023-02-06 VITALS
DIASTOLIC BLOOD PRESSURE: 73 MMHG | SYSTOLIC BLOOD PRESSURE: 115 MMHG | BODY MASS INDEX: 26.17 KG/M2 | HEART RATE: 88 BPM | WEIGHT: 134 LBS

## 2023-02-06 DIAGNOSIS — O09.529 ANTEPARTUM MULTIGRAVIDA OF ADVANCED MATERNAL AGE: Primary | ICD-10-CM

## 2023-02-06 LAB
NIPT, EXTERNAL: NORMAL
NIPT, EXTERNAL: NORMAL

## 2023-02-06 PROCEDURE — 0502F SUBSEQUENT PRENATAL CARE: CPT | Performed by: OBSTETRICS & GYNECOLOGY

## 2023-02-06 RX ORDER — PYRIDOXINE HCL (VITAMIN B6) 25 MG
25 TABLET ORAL DAILY
COMMUNITY

## 2023-02-06 NOTE — PROGRESS NOTES
Still getting HA, maybe a little better last day or two. Taking tylenol, has not taken Mg. Still getting nausea, usually at lunch/afternoon. Panorama/Horizon today. Has MFM 2/20 (AMA).   AUDI 4w, AFP

## 2023-02-20 ENCOUNTER — HOSPITAL ENCOUNTER (OUTPATIENT)
Dept: PERINATAL CARE | Age: 33
Discharge: HOME | End: 2023-02-20
Attending: OBSTETRICS & GYNECOLOGY
Payer: COMMERCIAL

## 2023-02-20 PROCEDURE — 76805 OB US >/= 14 WKS SNGL FETUS: CPT | Performed by: OBSTETRICS & GYNECOLOGY

## 2023-03-09 ENCOUNTER — ROUTINE PRENATAL (OUTPATIENT)
Dept: OBGYN CLINIC | Age: 33
End: 2023-03-09
Payer: COMMERCIAL

## 2023-03-09 VITALS
DIASTOLIC BLOOD PRESSURE: 67 MMHG | SYSTOLIC BLOOD PRESSURE: 106 MMHG | HEART RATE: 76 BPM | BODY MASS INDEX: 26.37 KG/M2 | WEIGHT: 135 LBS

## 2023-03-09 DIAGNOSIS — Z34.90 PREGNANCY, UNSPECIFIED GESTATIONAL AGE: Primary | ICD-10-CM

## 2023-03-09 PROCEDURE — 0502F SUBSEQUENT PRENATAL CARE: CPT | Performed by: OBSTETRICS & GYNECOLOGY

## 2023-03-09 NOTE — PROGRESS NOTES
+flutters. AFP today. Still has HA, maybe a little better. 20w US with MFM (needs to mary, will be out of town). AUDI 4wks.     - U=D; does report cycle length 31-32d.  Will use conventional dating by LMP:  lmp=11/14/22 -> JOSE ALEJANDRO=8/21/23  - AMA.  Plan NIPS, refer MFM/genetics  - fibromyalgia.  Elavil prior to preg  - migraines. Tylenol, mg, B2 (per MFM), ?excedrin once daily? Beta-blocker if NI  - nausea.  Taking OTC unisom, B6.  eRX sent for zofran, phenergan, diclegis  - ?heart murmur/abd bruit per PCP (not appreciated on exam here) -> to schedule imaging per PCP rec  - Panorama (XY) low risk;  Horizon neg all 27  - MFM US (2/20/23) 13+0 @ 14+0, nl -> 20w ** ___

## 2023-03-11 LAB
AFP INTERP SERPL-IMP: NORMAL
AFP INTERP SERPL-IMP: NORMAL
AFP MOM SERPL: 1.03
AFP SERPL-MCNC: 40.8 NG/ML
AGE AT DELIVERY: 35.6 YR
COMMENT, 018013: NORMAL
GA METHOD: NORMAL
GA: 16.5 WEEKS
IDDM PATIENT QL: NO
MULTIPLE PREGNANCY: NO
NEURAL TUBE DEFECT RISK FETUS: NORMAL %
RESULTS, 017004: NORMAL

## 2023-05-12 ENCOUNTER — HOSPITAL ENCOUNTER (OUTPATIENT)
Facility: HOSPITAL | Age: 33
Discharge: HOME | End: 2023-05-15
Payer: COMMERCIAL

## 2023-05-12 ENCOUNTER — HOSPITAL ENCOUNTER (OUTPATIENT)
Facility: HOSPITAL | Age: 33
Discharge: HOME | End: 2023-05-15

## 2023-05-12 PROCEDURE — 76816 OB US FOLLOW-UP PER FETUS: CPT | Performed by: OBSTETRICS & GYNECOLOGY

## 2023-05-15 ENCOUNTER — ROUTINE PRENATAL (OUTPATIENT)
Age: 33
End: 2023-05-15

## 2023-05-15 VITALS
BODY MASS INDEX: 27.34 KG/M2 | SYSTOLIC BLOOD PRESSURE: 120 MMHG | DIASTOLIC BLOOD PRESSURE: 63 MMHG | HEART RATE: 90 BPM | WEIGHT: 140 LBS

## 2023-05-15 DIAGNOSIS — O09.529 SUPERVISION OF ELDERLY MULTIGRAVIDA, UNSPECIFIED TRIMESTER: Primary | ICD-10-CM

## 2023-05-15 DIAGNOSIS — Z34.90 ENCOUNTER FOR SUPERVISION OF NORMAL PREGNANCY, ANTEPARTUM, UNSPECIFIED GRAVIDITY: ICD-10-CM

## 2023-05-15 PROCEDURE — 0502F SUBSEQUENT PRENATAL CARE: CPT | Performed by: OBSTETRICS & GYNECOLOGY

## 2023-05-15 RX ORDER — CETIRIZINE HYDROCHLORIDE 10 MG/1
10 TABLET ORAL DAILY
COMMUNITY

## 2023-05-15 RX ORDER — ONDANSETRON 4 MG/1
4 TABLET, ORALLY DISINTEGRATING ORAL EVERY 8 HOURS PRN
COMMUNITY
Start: 2023-02-20

## 2023-05-15 RX ORDER — ACETAMINOPHEN 325 MG/1
TABLET ORAL
COMMUNITY

## 2023-05-15 RX ORDER — VITAMIN A ACETATE, BETA CAROTENE, ASCORBIC ACID, CHOLECALCIFEROL, .ALPHA.-TOCOPHEROL ACETATE, DL-, THIAMINE MONONITRATE, RIBOFLAVIN, NIACINAMIDE, PYRIDOXINE HYDROCHLORIDE, FOLIC ACID, CYANOCOBALAMIN, CALCIUM CARBONATE, FERROUS FUMARATE, ZINC OXIDE, CUPRIC OXIDE 3080; 12; 120; 400; 1; 1.84; 3; 20; 22; 920; 25; 200; 27; 10; 2 [IU]/1; UG/1; MG/1; [IU]/1; MG/1; MG/1; MG/1; MG/1; MG/1; [IU]/1; MG/1; MG/1; MG/1; MG/1; MG/1
1 TABLET, FILM COATED ORAL DAILY
COMMUNITY

## 2023-05-15 NOTE — PROGRESS NOTES
MFM 5/12 for sgnf heart defect (TOF with pulm atresia vs severe PS with VSD). SCOTT revised to 8/26 (from 8/21). Cont growth US q4wks, echo; will see [facility] @ 32wks, plan to del there. 1hr/CBC today. JUAN 3wks with TDAP, then 2-3wks.

## 2023-05-15 NOTE — PROCEDURES
Indication  ========    Advanced maternal age  Fetal Tetrology of Fallot    Method  ======    Transabdominal ultrasound examination. View: Sufficient    Pregnancy  =========    Cedeno pregnancy. Number of fetuses: 1    Dating  ======    Previous Ultrasound on: 1/16/2023  Type of prior assessment: GA  GA at prior assessment date 8 w + 2 d  GA by previous U/S 24 w + 6 d  SCOTT by previous Ultrasound: 8/26/2023  Ultrasound examination on: 5/12/2023  GA by U/S based upon: [facility], BPD, Femur, HC  GA by U/S 25 w + 0 d  SCOTT by U/S: 8/25/2023  Assigned: based on ultrasound (GA), selected on 05/12/2023  Assigned GA 24 w + 6 d  Assigned SCOTT: 8/26/2023    Fetal Biometry  ============    Standard  BPD 64.3 mm 26w 0d 80% Hadlock  OFD 80.9 mm 26w 2d 90% Rosalinda  .7 mm 25w 1d 42% Hadlock  Cerebellum tr 26.7 mm 23w 6d 31% Hill  .1 mm 25w 1d 49% Hadlock  Femur 41.9 mm 23w 4d 8% Hadlock   g 24w 3d 30% Hadlock  EFW (lb) 1 lb  EFW (oz) 9 oz  EFW by: Hadlock (BPD-HC-AC-FL)  Extended   6.7 mm  CM 4.1 mm  5% Nicolaides  Other Structures   bpm    General Evaluation  ==============    Cardiac activity present.  bpm. Fetal movements: visualized. Presentation: breech  Placenta: Anterior placenta, succenturiate lobe posterior  Umbilical cord: Previously documented  Amniotic fluid: Amount of AF: normal amount. MVP 4.9 cm    Fetal Anatomy  ===========    Cranium: normal  Lateral ventricles: normal  4-chamber view: abnormal  RVOT view: abnormal  LVOT view: abnormal  3-vessel view: abnormal  3-vessel-trachea view: abnormal  Stomach: normal  Kidneys: normal  Bladder: normal  Fetal sex: male  Wants to know fetal sex: yes    Findings  =======    Follow up ultrasound (28777)    This is a cedeno pregnancy with biometry consistent with prior dating. Anatomy appears normal as noted above. Normal fluid and fetal movement are noted. Interval growth is appropriate and estimated fetal weight is at 30%.     Plan of

## 2023-05-16 LAB
ERYTHROCYTE [DISTWIDTH] IN BLOOD BY AUTOMATED COUNT: 12.1 % (ref 11.5–14.5)
GLUCOSE 1H P 100 G GLC PO SERPL-MCNC: 120 MG/DL (ref 65–140)
HCT VFR BLD AUTO: 30 % (ref 35–47)
HGB BLD-MCNC: 9.9 G/DL (ref 11.5–16)
MCH RBC QN AUTO: 32.9 PG (ref 26–34)
MCHC RBC AUTO-ENTMCNC: 33 G/DL (ref 30–36.5)
MCV RBC AUTO: 99.7 FL (ref 80–99)
NRBC # BLD: 0 K/UL (ref 0–0.01)
NRBC BLD-RTO: 0 PER 100 WBC
PLATELET # BLD AUTO: 206 K/UL (ref 150–400)
PMV BLD AUTO: 10 FL (ref 8.9–12.9)
RBC # BLD AUTO: 3.01 M/UL (ref 3.8–5.2)
WBC # BLD AUTO: 6.2 K/UL (ref 3.6–11)

## 2023-05-22 RX ORDER — ONDANSETRON 4 MG/1
4 TABLET, ORALLY DISINTEGRATING ORAL EVERY 8 HOURS PRN
COMMUNITY
Start: 2023-01-16 | End: 2023-06-19 | Stop reason: SDUPTHER

## 2023-05-22 RX ORDER — PYRIDOXINE HCL (VITAMIN B6) 25 MG
25 TABLET ORAL DAILY
COMMUNITY
End: 2023-06-19

## 2023-05-22 RX ORDER — PROMETHAZINE HYDROCHLORIDE 25 MG/1
25 TABLET ORAL EVERY 6 HOURS PRN
COMMUNITY
Start: 2023-01-16 | End: 2023-06-19

## 2023-05-22 RX ORDER — DOXYLAMINE SUCCINATE AND PYRIDOXINE HYDROCHLORIDE, DELAYED RELEASE TABLETS 10 MG/10 MG 10; 10 MG/1; MG/1
TABLET, DELAYED RELEASE ORAL
COMMUNITY
Start: 2023-01-16 | End: 2023-06-19

## 2023-05-24 ENCOUNTER — ROUTINE PRENATAL (OUTPATIENT)
Age: 33
End: 2023-05-24

## 2023-05-24 ENCOUNTER — TELEPHONE (OUTPATIENT)
Age: 33
End: 2023-05-24

## 2023-05-24 VITALS — WEIGHT: 142 LBS | SYSTOLIC BLOOD PRESSURE: 112 MMHG | DIASTOLIC BLOOD PRESSURE: 68 MMHG | BODY MASS INDEX: 27.73 KG/M2

## 2023-05-24 DIAGNOSIS — Z3A.26 26 WEEKS GESTATION OF PREGNANCY: Primary | ICD-10-CM

## 2023-05-24 PROCEDURE — 0502F SUBSEQUENT PRENATAL CARE: CPT | Performed by: OBSTETRICS & GYNECOLOGY

## 2023-05-24 SDOH — ECONOMIC STABILITY: HOUSING INSECURITY
IN THE LAST 12 MONTHS, WAS THERE A TIME WHEN YOU DID NOT HAVE A STEADY PLACE TO SLEEP OR SLEPT IN A SHELTER (INCLUDING NOW)?: NO

## 2023-05-24 SDOH — ECONOMIC STABILITY: FOOD INSECURITY: WITHIN THE PAST 12 MONTHS, THE FOOD YOU BOUGHT JUST DIDN'T LAST AND YOU DIDN'T HAVE MONEY TO GET MORE.: NEVER TRUE

## 2023-05-24 SDOH — ECONOMIC STABILITY: FOOD INSECURITY: WITHIN THE PAST 12 MONTHS, YOU WORRIED THAT YOUR FOOD WOULD RUN OUT BEFORE YOU GOT MONEY TO BUY MORE.: NEVER TRUE

## 2023-05-24 SDOH — ECONOMIC STABILITY: INCOME INSECURITY: HOW HARD IS IT FOR YOU TO PAY FOR THE VERY BASICS LIKE FOOD, HOUSING, MEDICAL CARE, AND HEATING?: NOT VERY HARD

## 2023-05-24 SDOH — ECONOMIC STABILITY: TRANSPORTATION INSECURITY
IN THE PAST 12 MONTHS, HAS LACK OF TRANSPORTATION KEPT YOU FROM MEETINGS, WORK, OR FROM GETTING THINGS NEEDED FOR DAILY LIVING?: NO

## 2023-05-24 NOTE — TELEPHONE ENCOUNTER
Patient advised of work in MD, Dr Yamini Cardona , recommendations and verbalized understanding    Patient reports the spotting is lighter and she will continue to monitor and call prn

## 2023-05-24 NOTE — TELEPHONE ENCOUNTER
She should observe the cut on her vulva, if she continues to bleed she should put some pressure on it. If there is any sign of infection then she needs to schedule an appt . Could also be bleeding from recent intercourse, rec pelvic rest and monitoring. Any increased bleeding, decreased fm, uterine contractions etc then needs to be seen.

## 2023-05-24 NOTE — TELEPHONE ENCOUNTER
Two patient identifiers used    DM patient 26year old  30w1d pregnant      Patient denies ROM, contractions and is feeling the baby moved. Patient calling to report having intercourse last night and states her  accicently cut her vulva with his fingernail and she has had spotting since the event  Patient reports she thought it was stopped    Patient reports bight red spotting on pad this am and with wiping. .    Patient denies flow    Patient was advised to increase po fluids , rest and monitor her symptoms      ?  Ov    Please advise    Thank you

## 2023-05-24 NOTE — PROGRESS NOTES
Pt presents co  amount of vaginal bleeding last night during intercourse. She reports her partner \"scratched\" vagina with nail and blood was coming from the vulva and stopped with pressure. She has not had bleeding this morning. Exam-very small left vaginal laceration, no bleeding. Cervix long/closed. Vaginal bleeding likely from vaginal laceration, now resolved. Pelvic rest x 1 week. Routine precautions discussed.

## 2023-06-01 ENCOUNTER — ROUTINE PRENATAL (OUTPATIENT)
Age: 33
End: 2023-06-01

## 2023-06-01 VITALS
BODY MASS INDEX: 27.54 KG/M2 | WEIGHT: 141 LBS | DIASTOLIC BLOOD PRESSURE: 68 MMHG | HEART RATE: 84 BPM | SYSTOLIC BLOOD PRESSURE: 104 MMHG

## 2023-06-01 DIAGNOSIS — D64.9 ANEMIA, UNSPECIFIED TYPE: ICD-10-CM

## 2023-06-01 DIAGNOSIS — Z34.90 PREGNANCY, UNSPECIFIED GESTATIONAL AGE: Primary | ICD-10-CM

## 2023-06-01 PROCEDURE — 0502F SUBSEQUENT PRENATAL CARE: CPT | Performed by: OBSTETRICS & GYNECOLOGY

## 2023-06-02 LAB
FERRITIN SERPL-MCNC: 25 NG/ML (ref 8–252)
IRON SATN MFR SERPL: 25 % (ref 20–50)
IRON SERPL-MCNC: 105 UG/DL (ref 35–150)
TIBC SERPL-MCNC: 424 UG/DL (ref 250–450)
VIT B12 SERPL-MCNC: 464 PG/ML (ref 193–986)

## 2023-06-09 ENCOUNTER — HOSPITAL ENCOUNTER (OUTPATIENT)
Facility: HOSPITAL | Age: 33
Discharge: HOME | End: 2023-06-09
Payer: COMMERCIAL

## 2023-06-09 PROCEDURE — 76816 OB US FOLLOW-UP PER FETUS: CPT

## 2023-06-13 NOTE — PROCEDURES
Indication  ========    Advanced maternal age  Fetal Tetrology of Fallot    Method  ======    Transabdominal ultrasound examination. View: Good view    Pregnancy  =========    Cedeno pregnancy. Number of fetuses: 1    Dating  ======    Previous Ultrasound on: 1/16/2023  Type of prior assessment: GA  GA at prior assessment date 8 w + 2 d  GA by previous U/S 28 w + 6 d  SCOTT by previous Ultrasound: 8/26/2023  Ultrasound examination on: 6/9/2023  GA by U/S based upon: [facility], BPD, Femur, HC  GA by U/S 34 w + 3 d  SCOTT by U/S: 8/22/2023  Assigned: based on ultrasound (GA), selected on 05/12/2023  Assigned GA 28 w + 6 d  Assigned SCOTT: 8/26/2023    Fetal Biometry  ============    Standard  BPD 78.2 mm 31w 3d 96% Hadlock  OFD 97.3 mm 31w 3d 97% Rosalinda  .6 mm 30w 5d 74% Hadlock  Cerebellum tr 32.7 mm 27w 6d 28% Hill  .3 mm 29w 0d 46% Hadlock  Femur 49.7 mm 26w 5d 2% Hadlock  EFW 1,248 g 28w 2d 27% Hadlock  EFW (lb) 2 lb  EFW (oz) 12 oz  EFW by: Hadlock (BPD-HC-AC-FL)  Extended   5.9 mm  Other Structures   bpm    General Evaluation  ==============    Cardiac activity present.  bpm. Fetal movements: visualized. Presentation: cephalic  Placenta: Anterior placenta, succenturiate lobe posterior  Umbilical cord: Previously documented  Amniotic fluid: normal amount, MVP 4.4 cm. ESTRELLA 13.4 cm. Q1 3.6 cm, Q2 4.4 cm, Q3 2.3 cm, Q4 3.0 cm    Fetal Anatomy  ===========    Cranium: normal  Lateral ventricles: normal  Midline falx: normal  Cerebellum: normal  4-chamber view: abnormal  RVOT view: abnormal  LVOT view: abnormal  3-vessel view: abnormal  Stomach: normal  Kidneys: normal  Bladder: normal  Fetal sex: male  Wants to know fetal sex: yes    Findings  =======    Follow up ultrasound (19192)    This is a cedeno pregnancy with biometry consistent with prior dating. Known fetal tet and o/w anatomy appears normal as noted above. Normal fluid and fetal movement  are noted.     Growth is appropriate for

## 2023-06-19 ENCOUNTER — ROUTINE PRENATAL (OUTPATIENT)
Age: 33
End: 2023-06-19
Payer: COMMERCIAL

## 2023-06-19 VITALS
BODY MASS INDEX: 27.73 KG/M2 | WEIGHT: 142 LBS | SYSTOLIC BLOOD PRESSURE: 103 MMHG | DIASTOLIC BLOOD PRESSURE: 65 MMHG | HEART RATE: 83 BPM

## 2023-06-19 DIAGNOSIS — R10.11 RUQ DISCOMFORT: ICD-10-CM

## 2023-06-19 DIAGNOSIS — Z34.90 PREGNANCY, UNSPECIFIED GESTATIONAL AGE: Primary | ICD-10-CM

## 2023-06-19 DIAGNOSIS — Z23 NEED FOR VACCINATION: ICD-10-CM

## 2023-06-19 PROCEDURE — 90471 IMMUNIZATION ADMIN: CPT | Performed by: OBSTETRICS & GYNECOLOGY

## 2023-06-19 PROCEDURE — 90715 TDAP VACCINE 7 YRS/> IM: CPT | Performed by: OBSTETRICS & GYNECOLOGY

## 2023-06-19 PROCEDURE — 0502F SUBSEQUENT PRENATAL CARE: CPT | Performed by: OBSTETRICS & GYNECOLOGY

## 2023-06-19 NOTE — PROGRESS NOTES
After obtaining consent, and per orders of mrava, injection of tdap 0.5ml given in left deltoid by Asaf Johnson RN. Patient instructed to remain in clinic for 20 minutes afterwards, and to report any adverse reaction to me immediately. Lot: [id number] Exp: 10/17/25 NDC: 21158-511-06 , VIS given.

## 2023-06-19 NOTE — PROGRESS NOTES
+FM.  Some RUQ/right rib burning sensation, not particularly painful, no GI sx, has not identified particular triggers; ice sometimes helpful: NT on exam - ?costochondritis, will draw CBC/CMP to r/o liver problem. Has [facility] appt 6/30. TDAP today. JUAN 2wks. - U=D; does report cycle length 31-32d. Conventional dating by   LMP:  lmp=11/14/22 -> SCOTT=8/21/23 -> SCOTT revised to 8/26/23 per MFM (based on 8w US, more c/w her cycle length of 31-32d)  - AMA. Plan NIPS, refer MFM/genetics  - fibromyalgia. Elavil prior to preg  - migraines. Tylenol, mg, B2 (per MFM), ?excedrin once daily? Beta-blocker if NI  - ?heart murmur/abd bruit per PCP (not appreciated on exam here) -> to schedule imaging per PCP rec  - Panorama (XY) low risk; Horizon neg all 27; AFP low risk  - MACROCYTIC ANEMIA. (5/15) hgb=9.9, MCV=99.7 -> draw B12 (normal), folate, iron profile, ferritin **still low , cont. Iron supp  - MFM US (2/20/23) 13+0 @ 14+0, nl -> 20w  - MFM US (4/12/23) 20+1 @ 21+2. 309gm (2%). AC=13%. 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 717gm (30%). AC=49%.  -> growth US 4wks, BPP @ 32w  - MFM US (6/9/23) 29+3 @ 28+6. 1248gm (27%).

## 2023-06-20 LAB
ALBUMIN SERPL-MCNC: 3.3 G/DL (ref 3.5–5)
ALBUMIN/GLOB SERPL: 1.2 (ref 1.1–2.2)
ALP SERPL-CCNC: 56 U/L (ref 45–117)
ALT SERPL-CCNC: 15 U/L (ref 12–78)
ANION GAP SERPL CALC-SCNC: 8 MMOL/L (ref 5–15)
AST SERPL-CCNC: 15 U/L (ref 15–37)
BASOPHILS # BLD: 0 K/UL (ref 0–0.1)
BASOPHILS NFR BLD: 0 % (ref 0–1)
BILIRUB SERPL-MCNC: 0.3 MG/DL (ref 0.2–1)
BUN SERPL-MCNC: 14 MG/DL (ref 6–20)
BUN/CREAT SERPL: 31 (ref 12–20)
CALCIUM SERPL-MCNC: 8.7 MG/DL (ref 8.5–10.1)
CHLORIDE SERPL-SCNC: 106 MMOL/L (ref 97–108)
CO2 SERPL-SCNC: 23 MMOL/L (ref 21–32)
CREAT SERPL-MCNC: 0.45 MG/DL (ref 0.55–1.02)
DIFFERENTIAL METHOD BLD: ABNORMAL
EOSINOPHIL # BLD: 0 K/UL (ref 0–0.4)
EOSINOPHIL NFR BLD: 0 % (ref 0–7)
ERYTHROCYTE [DISTWIDTH] IN BLOOD BY AUTOMATED COUNT: 12 % (ref 11.5–14.5)
GLOBULIN SER CALC-MCNC: 2.8 G/DL (ref 2–4)
GLUCOSE SERPL-MCNC: 74 MG/DL (ref 65–100)
HCT VFR BLD AUTO: 32.5 % (ref 35–47)
HGB BLD-MCNC: 10.5 G/DL (ref 11.5–16)
IMM GRANULOCYTES # BLD AUTO: 0 K/UL (ref 0–0.04)
IMM GRANULOCYTES NFR BLD AUTO: 1 % (ref 0–0.5)
LYMPHOCYTES # BLD: 1.4 K/UL (ref 0.8–3.5)
LYMPHOCYTES NFR BLD: 19 % (ref 12–49)
MCH RBC QN AUTO: 32.8 PG (ref 26–34)
MCHC RBC AUTO-ENTMCNC: 32.3 G/DL (ref 30–36.5)
MCV RBC AUTO: 101.6 FL (ref 80–99)
MONOCYTES # BLD: 0.6 K/UL (ref 0–1)
MONOCYTES NFR BLD: 8 % (ref 5–13)
NEUTS SEG # BLD: 5.2 K/UL (ref 1.8–8)
NEUTS SEG NFR BLD: 72 % (ref 32–75)
NRBC # BLD: 0 K/UL (ref 0–0.01)
NRBC BLD-RTO: 0 PER 100 WBC
PLATELET # BLD AUTO: 196 K/UL (ref 150–400)
PMV BLD AUTO: 10 FL (ref 8.9–12.9)
POTASSIUM SERPL-SCNC: 4.2 MMOL/L (ref 3.5–5.1)
PROT SERPL-MCNC: 6.1 G/DL (ref 6.4–8.2)
RBC # BLD AUTO: 3.2 M/UL (ref 3.8–5.2)
SODIUM SERPL-SCNC: 137 MMOL/L (ref 136–145)
WBC # BLD AUTO: 7.3 K/UL (ref 3.6–11)

## 2023-07-03 ENCOUNTER — ROUTINE PRENATAL (OUTPATIENT)
Age: 33
End: 2023-07-03

## 2023-07-03 VITALS
SYSTOLIC BLOOD PRESSURE: 115 MMHG | HEIGHT: 60 IN | HEART RATE: 81 BPM | BODY MASS INDEX: 28.78 KG/M2 | WEIGHT: 146.6 LBS | DIASTOLIC BLOOD PRESSURE: 67 MMHG

## 2023-07-03 DIAGNOSIS — Z34.90 PREGNANCY, UNSPECIFIED GESTATIONAL AGE: Primary | ICD-10-CM

## 2023-07-03 PROCEDURE — 0502F SUBSEQUENT PRENATAL CARE: CPT | Performed by: OBSTETRICS & GYNECOLOGY

## 2023-07-21 ENCOUNTER — ROUTINE PRENATAL (OUTPATIENT)
Age: 33
End: 2023-07-21

## 2023-07-21 VITALS
DIASTOLIC BLOOD PRESSURE: 76 MMHG | BODY MASS INDEX: 28.71 KG/M2 | HEART RATE: 102 BPM | WEIGHT: 147 LBS | SYSTOLIC BLOOD PRESSURE: 128 MMHG

## 2023-07-21 DIAGNOSIS — O09.529 SUPERVISION OF ELDERLY MULTIGRAVIDA, UNSPECIFIED TRIMESTER: Primary | ICD-10-CM

## 2023-07-21 RX ORDER — ONDANSETRON 4 MG/1
4 TABLET, ORALLY DISINTEGRATING ORAL EVERY 8 HOURS PRN
Qty: 30 TABLET | Refills: 2 | Status: SHIPPED | OUTPATIENT
Start: 2023-07-21

## 2023-07-21 NOTE — PROGRESS NOTES
+FM, +BHC. Has [facility] appt @ 36wks, they will do GBS there. Having more sciatica; feels like baby has dropped more pelvic discomfort. Has JUAN here 8/7, 8/14, can cancel if will be seen at [facility].      - U=D; does report cycle length 31-32d. Conventional dating by   LMP:  lmp=11/14/22 -> SCOTT=8/21/23 -> SCOTT revised to 8/26/23 per MFM (based on 8w US, more c/w her cycle length of 31-32d)  - AMA. Plan NIPS, refer MFM/genetics  - fibromyalgia. Elavil prior to preg  - migraines. Tylenol, mg, B2 (per MFM), ?excedrin once daily? Beta-blocker if NI  - ?heart murmur/abd bruit per PCP (not appreciated on exam here) -> to schedule imaging per PCP rec  - Panorama (XY) low risk; Horizon neg all 27; AFP low risk  - MACROCYTIC ANEMIA. (5/15) hgb=9.9, MCV=99.7 -> draw B12 (normal), folate, iron profile, ferritin **still low , cont. Iron supp  - MFM US (2/20/23) 13+0 @ 14+0, nl -> 20w  - MFM US (4/12/23) 20+1 @ 21+2. 309gm (2%). AC=13%. 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 717gm (30%). AC=49%.  -> growth US 4wks, BPP @ 32w  - MFM US (6/9/23) 29+3 @ 28+6. 1248gm (27%).

## 2023-08-07 ENCOUNTER — ROUTINE PRENATAL (OUTPATIENT)
Age: 33
End: 2023-08-07

## 2023-08-07 VITALS
DIASTOLIC BLOOD PRESSURE: 68 MMHG | BODY MASS INDEX: 29.88 KG/M2 | SYSTOLIC BLOOD PRESSURE: 114 MMHG | WEIGHT: 153 LBS | HEART RATE: 84 BPM

## 2023-08-07 DIAGNOSIS — Z34.90 PREGNANCY, UNSPECIFIED GESTATIONAL AGE: Primary | ICD-10-CM

## 2023-08-07 PROCEDURE — 0502F SUBSEQUENT PRENATAL CARE: CPT | Performed by: OBSTETRICS & GYNECOLOGY

## 2023-08-07 NOTE — PROGRESS NOTES
+FM.  +BHC (but these have lessened). Had [facility] appt 7/28 with GBS (neg per pt). Plan IOL 8/22 @ [facility]. JUAN 8/14 here as scheduled.

## 2023-08-14 ENCOUNTER — ROUTINE PRENATAL (OUTPATIENT)
Age: 33
End: 2023-08-14

## 2023-08-14 VITALS
HEART RATE: 74 BPM | WEIGHT: 155 LBS | BODY MASS INDEX: 30.27 KG/M2 | SYSTOLIC BLOOD PRESSURE: 106 MMHG | DIASTOLIC BLOOD PRESSURE: 69 MMHG

## 2023-08-14 DIAGNOSIS — O09.529 SUPERVISION OF ELDERLY MULTIGRAVIDA, UNSPECIFIED TRIMESTER: Primary | ICD-10-CM

## 2023-08-14 PROCEDURE — 0502F SUBSEQUENT PRENATAL CARE: CPT | Performed by: OBSTETRICS & GYNECOLOGY

## 2023-08-14 NOTE — PROGRESS NOTES
+FM.  Doing well. Scheduled for IOL @ [facility] next week, 8/22 d/t fetal cardiac anomaly (?TOF). Can schedule her 6wk PP visit here.       - U=D; does report cycle length 31-32d. Conventional dating by   LMP:  lmp=11/14/22 -> SCOTT=8/21/23 -> SCOTT revised to 8/26/23 per MFM (based on 8w US, more c/w her cycle length of 31-32d)  - AMA. Plan NIPS, refer MFM/genetics  - fibromyalgia. Elavil prior to preg  - migraines. Tylenol, mg, B2 (per MFM), ?excedrin once daily? Beta-blocker if NI  - ?heart murmur/abd bruit per PCP (not appreciated on exam here) -> to schedule imaging per PCP rec  - Panorama (XY) low risk; Horizon neg all 27; AFP low risk  - MACROCYTIC ANEMIA. (5/15) hgb=9.9, MCV=99.7 -> draw B12 (normal), folate, iron profile, ferritin **still low , cont. Iron supp  - MFM US (2/20/23) 13+0 @ 14+0, nl -> 20w  - MFM US (4/12/23) 20+1 @ 21+2. 309gm (2%). AC=13%. 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 717gm (30%). AC=49%.  -> growth US 4wks, BPP @ 32w  - MFM US (6/9/23) 29+3 @ 28+6. 1248gm (27%).

## 2023-10-19 ENCOUNTER — POSTPARTUM VISIT (OUTPATIENT)
Age: 33
End: 2023-10-19

## 2023-10-19 VITALS
DIASTOLIC BLOOD PRESSURE: 77 MMHG | WEIGHT: 138 LBS | HEART RATE: 77 BPM | SYSTOLIC BLOOD PRESSURE: 125 MMHG | BODY MASS INDEX: 26.95 KG/M2

## 2023-10-19 DIAGNOSIS — D50.8 OTHER IRON DEFICIENCY ANEMIA: ICD-10-CM

## 2023-10-19 DIAGNOSIS — E01.0 THYROMEGALY: ICD-10-CM

## 2023-10-19 DIAGNOSIS — E53.8 B12 DEFICIENCY: ICD-10-CM

## 2023-10-19 RX ORDER — ACETAMINOPHEN AND CODEINE PHOSPHATE 120; 12 MG/5ML; MG/5ML
1 SOLUTION ORAL DAILY
Qty: 84 TABLET | Refills: 2 | Status: SHIPPED | OUTPATIENT
Start: 2023-10-19

## 2023-10-19 NOTE — PATIENT INSTRUCTIONS
The number for Central Scheduling is [phone]. You can call them directly to schedule your testing if you prefer. Please let us know if you have any questions or need help getting scheduled.

## 2023-10-20 LAB
ERYTHROCYTE [DISTWIDTH] IN BLOOD BY AUTOMATED COUNT: 11.2 % (ref 11.5–14.5)
FERRITIN SERPL-MCNC: 65 NG/ML (ref 26–388)
FOLATE SERPL-MCNC: >100 NG/ML (ref 5–21)
HCT VFR BLD AUTO: 38.8 % (ref 35–47)
HGB BLD-MCNC: 12.3 G/DL (ref 11.5–16)
IRON SATN MFR SERPL: 36 % (ref 20–50)
IRON SERPL-MCNC: 116 UG/DL (ref 35–150)
MCH RBC QN AUTO: 31.2 PG (ref 26–34)
MCHC RBC AUTO-ENTMCNC: 31.7 G/DL (ref 30–36.5)
MCV RBC AUTO: 98.5 FL (ref 80–99)
NRBC # BLD: 0 K/UL (ref 0–0.01)
NRBC BLD-RTO: 0 PER 100 WBC
PLATELET # BLD AUTO: 248 K/UL (ref 150–400)
PMV BLD AUTO: 10.1 FL (ref 8.9–12.9)
RBC # BLD AUTO: 3.94 M/UL (ref 3.8–5.2)
T4 FREE SERPL-MCNC: 1 NG/DL (ref 0.8–1.5)
TIBC SERPL-MCNC: 325 UG/DL (ref 250–450)
TSH SERPL DL<=0.05 MIU/L-ACNC: 1.14 UIU/ML (ref 0.36–3.74)
VIT B12 SERPL-MCNC: 753 PG/ML (ref 193–986)
WBC # BLD AUTO: 5.8 K/UL (ref 3.6–11)

## 2023-10-21 LAB — THYROPEROXIDASE AB SERPL-ACNC: 15 IU/ML (ref 0–34)

## 2024-04-19 ENCOUNTER — PATIENT MESSAGE (OUTPATIENT)
Age: 34
End: 2024-04-19

## 2024-04-19 RX ORDER — NORETHINDRONE ACETATE AND ETHINYL ESTRADIOL, ETHINYL ESTRADIOL AND FERROUS FUMARATE 1MG-10(24)
1 KIT ORAL DAILY
Qty: 3 PACKET | Refills: 0 | Status: SHIPPED | OUTPATIENT
Start: 2024-04-19

## 2024-06-21 ENCOUNTER — HOSPITAL ENCOUNTER (OUTPATIENT)
Facility: HOSPITAL | Age: 34
Discharge: HOME | End: 2024-06-21
Attending: OBSTETRICS & GYNECOLOGY
Payer: COMMERCIAL

## 2024-06-21 DIAGNOSIS — E01.0 THYROMEGALY: ICD-10-CM

## 2024-06-21 PROCEDURE — 76536 US EXAM OF HEAD AND NECK: CPT

## 2024-07-15 NOTE — PROGRESS NOTES
Sapna Cordero is a 34 y.o. female returns for an annual exam     Chief Complaint   Patient presents with    Annual Exam       Patient's last menstrual period was 06/06/2024 (approximate).  Her periods are moderate in flow and often irregular with no apparent pattern.  She does not have dysmenorrhea.  Problems: problems - irreg periods  Birth Control: OCP (estrogen/progesterone) and oral progesterone-only contraceptive.  Last Pap: normal obtained 6/29/22  She does not have a history of LISSET 2, 3 or cervical cancer.   With regard to the Gardisil vaccine, she declines to receive it

## 2024-07-15 NOTE — PROGRESS NOTES
Per Nursing Note:  Ana Cristina mccarthy, RN Registered Nurse Yesterday       Sapna Cordero is a 34 y.o. female returns for an annual exam          Chief Complaint   Patient presents with    Annual Exam         Patient's last menstrual period was 2024 (approximate).  Her periods are moderate in flow and often irregular with no apparent pattern.  She does not have dysmenorrhea.  Problems: problems - irreg periods  Birth Control: OCP (estrogen/progesterone) and oral progesterone-only contraceptive.  Last Pap: normal obtained 22  She does not have a history of LISSET 2, 3 or cervical cancer.   With regard to the Gardisil vaccine, she declines to receive it            Annual exam      Chief Complaint   Patient presents with    Annual Exam       Sapna Cordero is a ,  34 y.o. female   Patient's last menstrual period was 2024 (approximate).  She presents for her annual checkup.   LV = 10/19/2023 for postpartum visit.     2023.  Delivered at [facility] due to son having tetralogy of Fallot.  He had a cardiology appointment last week, may need to do interventional sooner than they were originally planning.  All of his procedures are being done at [facility] but his appointments have been scheduled between [facility] and [facility].    Was taking LoLoestrin.  Ran out.  Had a pack of micronor left over, so is taking that.  Started this 2 days ago.  Now thinks she was actually on Loestrin 24 in the past not low Loestrin.  She would like to resume the LE 24.    Also wondering if she has rectus diastasis.    Menstrual status:    Has had irregular bleeding on OCPs.        Sexual history:    She  reports being sexually active and has had partner(s) who are male. She reports using the following method of birth control/protection: Pill.      Pap Smear:  Her most recent Pap smear was normal, HPV was negative, obtained 2022.    She does not  have a history of abnormal paps.           Past Medical History:   Diagnosis Date    Anxiety

## 2024-07-16 ENCOUNTER — OFFICE VISIT (OUTPATIENT)
Age: 34
End: 2024-07-16
Payer: COMMERCIAL

## 2024-07-16 VITALS
DIASTOLIC BLOOD PRESSURE: 77 MMHG | HEART RATE: 100 BPM | HEIGHT: 60 IN | SYSTOLIC BLOOD PRESSURE: 113 MMHG | BODY MASS INDEX: 27.29 KG/M2 | WEIGHT: 139 LBS

## 2024-07-16 DIAGNOSIS — M62.08 RECTUS DIASTASIS: ICD-10-CM

## 2024-07-16 DIAGNOSIS — Z01.419 ENCOUNTER FOR WELL WOMAN EXAM WITH ROUTINE GYNECOLOGICAL EXAM: Primary | ICD-10-CM

## 2024-07-16 PROCEDURE — 99395 PREV VISIT EST AGE 18-39: CPT | Performed by: OBSTETRICS & GYNECOLOGY

## 2024-07-16 RX ORDER — NORETHINDRONE ACETATE AND ETHINYL ESTRADIOL 1MG-20(24)
1 KIT ORAL DAILY
Qty: 3 PACKET | Refills: 4 | Status: SHIPPED | OUTPATIENT
Start: 2024-07-16

## 2024-08-22 ENCOUNTER — HOSPITAL ENCOUNTER (OUTPATIENT)
Facility: HOSPITAL | Age: 34
Setting detail: RECURRING SERIES
Discharge: HOME | End: 2024-08-25
Attending: OBSTETRICS & GYNECOLOGY
Payer: COMMERCIAL

## 2024-08-22 PROCEDURE — 97161 PT EVAL LOW COMPLEX 20 MIN: CPT

## 2024-08-22 PROCEDURE — 97112 NEUROMUSCULAR REEDUCATION: CPT

## 2024-08-22 PROCEDURE — 97535 SELF CARE MNGMENT TRAINING: CPT

## 2024-08-22 NOTE — THERAPY EVALUATION
Physical Therapy at [place],   a part of [facility]  [address] [place] [address], [address]  [place], [place] [address]  Phone: [phone]  Fax: [phone]     PHYSICAL THERAPY - MEDICARE EVALUATION/PLAN OF CARE NOTE (updated 3/23)    Date: 2024        Patient Name:  Sapna Cordero :  1990   Medical   Diagnosis:  Rectus diastasis [M62.08] Treatment Diagnosis:  M62.838  OTHER MUSCLE WEAKNESS and O71.6   OBSTETRIC DAMAGE TO PELVIC JOINTS AND LIGAMENTS    Referral Source:  Gay Benson MD Provider #:  [id number]                Insurance: Payor: BC / Plan: [employer] [place] / Product Type: *No Product type* /      Patient  verified yes     Visit #   Current  / Total 1 12   Time   In / Out 830a 930a   Total Treatment Time 60   Total Timed Codes 40   1:1 Treatment Time 40    [facility] BC Totals Reminder:  bill using total billable   min of TIMED therapeutic procedures and modalities.   8-22 min = 1 unit; 23-37 min = 2 units; 38-52 min = 3 units;  53-67 min = 4 units; 68-82 min = 5 units     SUBJECTIVE  Pain Level (0-10 scale): 0  []constant []intermittent []improving []worsening []no change since onset    Any medication changes, allergies to medications, adverse drug reactions, diagnosis change, or new procedure performed?: [x] No    [] Yes (see summary sheet for update)  Medications: Verified on Patient Summary List    Subjective functional status/changes:       Delivered son with 2nd  in 2023. Has noticed weakness in her abdomen since. Hasn't noticed coning as much as when she was pregnant. \"I don't have the center of gravity like I used to\". Some back pain with standing for long periods of time. Hx sciatic nerve irritation during and a little after pregnancy but better now. Has in the past \"pulled\" her back.     No bladder or bowel problems. Hx of RAYMUNDO but not noticed now. Denies pelvic pain.     Start of Care: 2024  Onset Date: 2023  Current   MEDIUM Complexity : 3 Standardized tests and measures addressin body structure, function, activity limitation and / or participation in recreation  ;Presentation:  LOW Complexity : Stable, uncomplicated  ;Clinical Decision Making:  LOW Complexity : FOTO score of  Overall Complexity Rating: LOW   Problem List: pain affecting function, decrease strength, impaired gait/balance, decrease ADL/functional abilities, decrease activity tolerance, and decrease transfer abilities    Treatment Plan may include any combination of the followin Therapeutic Exercise, 26597 Neuromuscular Re-Education, 54995 Manual Therapy, 27552 Therapeutic Activity, 87851 Self Care/Home Management, and 43429 Gait Training  Patient / Family readiness to learn indicated by: asking questions, trying to perform skills, interest, return verbalization , and return demonstration   Persons(s) to be included in education: patient (P)  Barriers to Learning/Limitations: none  Measures taken if barriers to learning present: na  Patient Self Reported Health Status: good  Rehabilitation Potential: good    Short Term Goals: To be accomplished in 4 treatments.  Patient will be independent with a progressive home exercise program without needed v.c. to promote return to general wellness program.  Patient will demonstrate or verbalize all pelvic floor protection techniques as instructed by PT to allow for lift of children without pain to improve ease of functional mobility.   Patient will be able to stand for 30 minutes without increase in back pain to allow for regular meal preparation without restriction.     Long Term Goals: To be accomplished in 12 treatments.  Patient will demonstrate 5/5 BLE strength to allow for home cleaning skills independently and without rest breaks needed  Patient will demonstrate elimination of LIZBETH to improve ease with household chores like emptying the    Patient will report worst pain no greater than 2/10 to

## 2025-04-25 ENCOUNTER — OFFICE VISIT (OUTPATIENT)
Age: 35
End: 2025-04-25

## 2025-04-25 VITALS
SYSTOLIC BLOOD PRESSURE: 129 MMHG | WEIGHT: 126 LBS | BODY MASS INDEX: 24.74 KG/M2 | RESPIRATION RATE: 16 BRPM | HEART RATE: 64 BPM | TEMPERATURE: 98.6 F | HEIGHT: 60 IN | DIASTOLIC BLOOD PRESSURE: 79 MMHG | OXYGEN SATURATION: 100 %

## 2025-04-25 DIAGNOSIS — J01.90 ACUTE SINUSITIS, RECURRENCE NOT SPECIFIED, UNSPECIFIED LOCATION: Primary | ICD-10-CM

## 2025-04-25 RX ORDER — GUAIFENESIN 200 MG/10ML
200 LIQUID ORAL 3 TIMES DAILY PRN
Qty: 210 ML | Refills: 0 | Status: SHIPPED | OUTPATIENT
Start: 2025-04-25 | End: 2025-05-02

## 2025-04-25 RX ORDER — METHYLPREDNISOLONE 4 MG/1
TABLET ORAL
Qty: 1 KIT | Refills: 0 | Status: SHIPPED | OUTPATIENT
Start: 2025-04-25 | End: 2025-05-01

## 2025-04-25 RX ORDER — DOXYCYCLINE HYCLATE 100 MG
100 TABLET ORAL 2 TIMES DAILY
Qty: 20 TABLET | Refills: 0 | Status: SHIPPED | OUTPATIENT
Start: 2025-04-25 | End: 2025-05-05

## 2025-04-25 NOTE — PROGRESS NOTES
Sapna Cordero (:  1990) is a 35 y.o. female,New patient, here for evaluation of the following chief complaint(s):  Sinusitis (Pt c/o nasal congestion, sinus pressure, cough ongoing for about one week. She's taken flonase, zyrtec, sudafed, and mucinex with no relief.)       ASSESSMENT/PLAN:  1. Acute sinusitis, recurrence not specified, unspecified location  -     doxycycline hyclate (VIBRA-TABS) 100 MG tablet; Take 1 tablet by mouth 2 times daily for 10 days, Disp-20 tablet, R-0Normal  -     guaiFENesin (ROBITUSSIN) 100 MG/5ML liquid; Take 10 mLs by mouth 3 times daily as needed for Cough, Disp-210 mL, R-0Normal  -     methylPREDNISolone (MEDROL DOSEPACK) 4 MG tablet; Take by mouth., Disp-1 kit, R-0Normal      Please follow up with your ENT or PCP in 7 days or less.  Call or return to clinic if no improvement or any worsening  Patient comfortable with plan         SUBJECTIVE/OBJECTIVE:    History provided by:  Patient   used: No    Sinusitis          35 y.o. female presents with symptoms of          Vitals:    25 1540   BP: 129/79   BP Site: Left Upper Arm   Patient Position: Sitting   BP Cuff Size: Medium Adult   Pulse: 64   Resp: 16   Temp: 98.6 °F (37 °C)   TempSrc: Oral   SpO2: 100%   Weight: 57.2 kg (126 lb)   Height: 1.524 m (5')         Physical Exam    We discussed when to utilize emergency services. Patient verbalized understanding.    An electronic signature was used to authenticate this note.    Edmundo Nam, APRN - CNP

## 2025-05-05 SDOH — ECONOMIC STABILITY: FOOD INSECURITY: WITHIN THE PAST 12 MONTHS, YOU WORRIED THAT YOUR FOOD WOULD RUN OUT BEFORE YOU GOT MONEY TO BUY MORE.: NEVER TRUE

## 2025-05-05 SDOH — ECONOMIC STABILITY: FOOD INSECURITY: WITHIN THE PAST 12 MONTHS, THE FOOD YOU BOUGHT JUST DIDN'T LAST AND YOU DIDN'T HAVE MONEY TO GET MORE.: NEVER TRUE

## 2025-05-05 SDOH — ECONOMIC STABILITY: INCOME INSECURITY: IN THE LAST 12 MONTHS, WAS THERE A TIME WHEN YOU WERE NOT ABLE TO PAY THE MORTGAGE OR RENT ON TIME?: NO

## 2025-05-05 SDOH — HEALTH STABILITY: PHYSICAL HEALTH: ON AVERAGE, HOW MANY DAYS PER WEEK DO YOU ENGAGE IN MODERATE TO STRENUOUS EXERCISE (LIKE A BRISK WALK)?: 0 DAYS

## 2025-05-05 SDOH — HEALTH STABILITY: PHYSICAL HEALTH: ON AVERAGE, HOW MANY MINUTES DO YOU ENGAGE IN EXERCISE AT THIS LEVEL?: 0 MIN

## 2025-05-05 SDOH — ECONOMIC STABILITY: TRANSPORTATION INSECURITY
IN THE PAST 12 MONTHS, HAS THE LACK OF TRANSPORTATION KEPT YOU FROM MEDICAL APPOINTMENTS OR FROM GETTING MEDICATIONS?: NO

## 2025-05-05 ASSESSMENT — PATIENT HEALTH QUESTIONNAIRE - PHQ9
3. TROUBLE FALLING OR STAYING ASLEEP: MORE THAN HALF THE DAYS
SUM OF ALL RESPONSES TO PHQ QUESTIONS 1-9: 10
SUM OF ALL RESPONSES TO PHQ QUESTIONS 1-9: 10
10. IF YOU CHECKED OFF ANY PROBLEMS, HOW DIFFICULT HAVE THESE PROBLEMS MADE IT FOR YOU TO DO YOUR WORK, TAKE CARE OF THINGS AT HOME, OR GET ALONG WITH OTHER PEOPLE: SOMEWHAT DIFFICULT
6. FEELING BAD ABOUT YOURSELF - OR THAT YOU ARE A FAILURE OR HAVE LET YOURSELF OR YOUR FAMILY DOWN: SEVERAL DAYS
2. FEELING DOWN, DEPRESSED OR HOPELESS: SEVERAL DAYS
5. POOR APPETITE OR OVEREATING: SEVERAL DAYS
SUM OF ALL RESPONSES TO PHQ QUESTIONS 1-9: 10
10. IF YOU CHECKED OFF ANY PROBLEMS, HOW DIFFICULT HAVE THESE PROBLEMS MADE IT FOR YOU TO DO YOUR WORK, TAKE CARE OF THINGS AT HOME, OR GET ALONG WITH OTHER PEOPLE: SOMEWHAT DIFFICULT
SUM OF ALL RESPONSES TO PHQ QUESTIONS 1-9: 10
9. THOUGHTS THAT YOU WOULD BE BETTER OFF DEAD, OR OF HURTING YOURSELF: NOT AT ALL
7. TROUBLE CONCENTRATING ON THINGS, SUCH AS READING THE NEWSPAPER OR WATCHING TELEVISION: SEVERAL DAYS
1. LITTLE INTEREST OR PLEASURE IN DOING THINGS: SEVERAL DAYS
9. THOUGHTS THAT YOU WOULD BE BETTER OFF DEAD, OR OF HURTING YOURSELF: NOT AT ALL
SUM OF ALL RESPONSES TO PHQ QUESTIONS 1-9: 10
7. TROUBLE CONCENTRATING ON THINGS, SUCH AS READING THE NEWSPAPER OR WATCHING TELEVISION: SEVERAL DAYS
1. LITTLE INTEREST OR PLEASURE IN DOING THINGS: SEVERAL DAYS
6. FEELING BAD ABOUT YOURSELF - OR THAT YOU ARE A FAILURE OR HAVE LET YOURSELF OR YOUR FAMILY DOWN: SEVERAL DAYS
8. MOVING OR SPEAKING SO SLOWLY THAT OTHER PEOPLE COULD HAVE NOTICED. OR THE OPPOSITE - BEING SO FIDGETY OR RESTLESS THAT YOU HAVE BEEN MOVING AROUND A LOT MORE THAN USUAL: NOT AT ALL
5. POOR APPETITE OR OVEREATING: SEVERAL DAYS
4. FEELING TIRED OR HAVING LITTLE ENERGY: NEARLY EVERY DAY
8. MOVING OR SPEAKING SO SLOWLY THAT OTHER PEOPLE COULD HAVE NOTICED. OR THE OPPOSITE, BEING SO FIGETY OR RESTLESS THAT YOU HAVE BEEN MOVING AROUND A LOT MORE THAN USUAL: NOT AT ALL
3. TROUBLE FALLING OR STAYING ASLEEP: MORE THAN HALF THE DAYS
2. FEELING DOWN, DEPRESSED OR HOPELESS: SEVERAL DAYS
4. FEELING TIRED OR HAVING LITTLE ENERGY: NEARLY EVERY DAY

## 2025-05-06 ENCOUNTER — OFFICE VISIT (OUTPATIENT)
Age: 35
End: 2025-05-06
Payer: COMMERCIAL

## 2025-05-06 ENCOUNTER — LAB (OUTPATIENT)
Age: 35
End: 2025-05-06

## 2025-05-06 VITALS
DIASTOLIC BLOOD PRESSURE: 74 MMHG | TEMPERATURE: 97.8 F | WEIGHT: 123 LBS | HEIGHT: 60 IN | BODY MASS INDEX: 24.15 KG/M2 | OXYGEN SATURATION: 99 % | RESPIRATION RATE: 16 BRPM | HEART RATE: 75 BPM | SYSTOLIC BLOOD PRESSURE: 108 MMHG

## 2025-05-06 DIAGNOSIS — R53.83 FATIGUE, UNSPECIFIED TYPE: ICD-10-CM

## 2025-05-06 DIAGNOSIS — F32.A ANXIETY AND DEPRESSION: Primary | ICD-10-CM

## 2025-05-06 DIAGNOSIS — E04.1 THYROID CYST: ICD-10-CM

## 2025-05-06 DIAGNOSIS — Z13.220 SCREENING FOR LIPID DISORDERS: ICD-10-CM

## 2025-05-06 DIAGNOSIS — M79.7 FIBROMYALGIA: ICD-10-CM

## 2025-05-06 DIAGNOSIS — F41.9 ANXIETY AND DEPRESSION: Primary | ICD-10-CM

## 2025-05-06 DIAGNOSIS — E04.1 THYROID NODULE: ICD-10-CM

## 2025-05-06 PROCEDURE — 99215 OFFICE O/P EST HI 40 MIN: CPT | Performed by: FAMILY MEDICINE

## 2025-05-06 RX ORDER — HYDROXYZINE HYDROCHLORIDE 50 MG/1
50 TABLET, FILM COATED ORAL 4 TIMES DAILY PRN
Qty: 90 TABLET | Refills: 0 | Status: SHIPPED | OUTPATIENT
Start: 2025-05-06

## 2025-05-07 ENCOUNTER — RESULTS FOLLOW-UP (OUTPATIENT)
Age: 35
End: 2025-05-07

## 2025-05-07 LAB
ALBUMIN SERPL-MCNC: 4 G/DL (ref 3.5–5)
ALBUMIN/GLOB SERPL: 1.2 (ref 1.1–2.2)
ALP SERPL-CCNC: 45 U/L (ref 45–117)
ALT SERPL-CCNC: 24 U/L (ref 12–78)
ANION GAP SERPL CALC-SCNC: 5 MMOL/L (ref 2–12)
AST SERPL-CCNC: 21 U/L (ref 15–37)
BASOPHILS # BLD: 0.04 K/UL (ref 0–0.1)
BASOPHILS NFR BLD: 0.9 % (ref 0–1)
BILIRUB SERPL-MCNC: 0.6 MG/DL (ref 0.2–1)
BUN SERPL-MCNC: 16 MG/DL (ref 6–20)
BUN/CREAT SERPL: 21 (ref 12–20)
CALCIUM SERPL-MCNC: 9.4 MG/DL (ref 8.5–10.1)
CHLORIDE SERPL-SCNC: 107 MMOL/L (ref 97–108)
CHOLEST SERPL-MCNC: 217 MG/DL
CO2 SERPL-SCNC: 26 MMOL/L (ref 21–32)
CREAT SERPL-MCNC: 0.76 MG/DL (ref 0.55–1.02)
DIFFERENTIAL METHOD BLD: NORMAL
EOSINOPHIL # BLD: 0.05 K/UL (ref 0–0.4)
EOSINOPHIL NFR BLD: 1.1 % (ref 0–7)
ERYTHROCYTE [DISTWIDTH] IN BLOOD BY AUTOMATED COUNT: 12 % (ref 11.5–14.5)
FERRITIN SERPL-MCNC: 84 NG/ML (ref 8–252)
GLOBULIN SER CALC-MCNC: 3.4 G/DL (ref 2–4)
GLUCOSE SERPL-MCNC: 87 MG/DL (ref 65–100)
HCT VFR BLD AUTO: 38.3 % (ref 35–47)
HDLC SERPL-MCNC: 72 MG/DL
HDLC SERPL: 3 (ref 0–5)
HGB BLD-MCNC: 13 G/DL (ref 11.5–16)
IMM GRANULOCYTES # BLD AUTO: 0 K/UL (ref 0–0.04)
IMM GRANULOCYTES NFR BLD AUTO: 0 % (ref 0–0.5)
IRON SATN MFR SERPL: 36 % (ref 20–50)
IRON SERPL-MCNC: 144 UG/DL (ref 35–150)
LDLC SERPL CALC-MCNC: 129.2 MG/DL (ref 0–100)
LYMPHOCYTES # BLD: 2.15 K/UL (ref 0.8–3.5)
LYMPHOCYTES NFR BLD: 47.4 % (ref 12–49)
MCH RBC QN AUTO: 32.5 PG (ref 26–34)
MCHC RBC AUTO-ENTMCNC: 33.9 G/DL (ref 30–36.5)
MCV RBC AUTO: 95.8 FL (ref 80–99)
MONOCYTES # BLD: 0.4 K/UL (ref 0–1)
MONOCYTES NFR BLD: 8.8 % (ref 5–13)
NEUTS SEG # BLD: 1.9 K/UL (ref 1.8–8)
NEUTS SEG NFR BLD: 41.8 % (ref 32–75)
NRBC # BLD: 0 K/UL (ref 0–0.01)
NRBC BLD-RTO: 0 PER 100 WBC
PLATELET # BLD AUTO: 283 K/UL (ref 150–400)
PMV BLD AUTO: 9.4 FL (ref 8.9–12.9)
POTASSIUM SERPL-SCNC: 4.7 MMOL/L (ref 3.5–5.1)
PROT SERPL-MCNC: 7.4 G/DL (ref 6.4–8.2)
RBC # BLD AUTO: 4 M/UL (ref 3.8–5.2)
SODIUM SERPL-SCNC: 138 MMOL/L (ref 136–145)
T4 FREE SERPL-MCNC: 1.2 NG/DL (ref 0.8–1.5)
TIBC SERPL-MCNC: 402 UG/DL (ref 250–450)
TRIGL SERPL-MCNC: 79 MG/DL
TSH SERPL DL<=0.05 MIU/L-ACNC: 0.63 UIU/ML (ref 0.36–3.74)
VLDLC SERPL CALC-MCNC: 15.8 MG/DL
WBC # BLD AUTO: 4.5 K/UL (ref 3.6–11)

## 2025-05-28 DIAGNOSIS — F41.9 ANXIETY AND DEPRESSION: ICD-10-CM

## 2025-05-28 DIAGNOSIS — F32.A ANXIETY AND DEPRESSION: ICD-10-CM

## 2025-06-16 ASSESSMENT — PATIENT HEALTH QUESTIONNAIRE - PHQ9
7. TROUBLE CONCENTRATING ON THINGS, SUCH AS READING THE NEWSPAPER OR WATCHING TELEVISION: SEVERAL DAYS
SUM OF ALL RESPONSES TO PHQ QUESTIONS 1-9: 10
8. MOVING OR SPEAKING SO SLOWLY THAT OTHER PEOPLE COULD HAVE NOTICED. OR THE OPPOSITE - BEING SO FIDGETY OR RESTLESS THAT YOU HAVE BEEN MOVING AROUND A LOT MORE THAN USUAL: NOT AT ALL
9. THOUGHTS THAT YOU WOULD BE BETTER OFF DEAD, OR OF HURTING YOURSELF: NOT AT ALL
5. POOR APPETITE OR OVEREATING: SEVERAL DAYS
2. FEELING DOWN, DEPRESSED OR HOPELESS: SEVERAL DAYS
SUM OF ALL RESPONSES TO PHQ QUESTIONS 1-9: 10
9. THOUGHTS THAT YOU WOULD BE BETTER OFF DEAD, OR OF HURTING YOURSELF: NOT AT ALL
SUM OF ALL RESPONSES TO PHQ QUESTIONS 1-9: 10
1. LITTLE INTEREST OR PLEASURE IN DOING THINGS: SEVERAL DAYS
4. FEELING TIRED OR HAVING LITTLE ENERGY: NEARLY EVERY DAY
2. FEELING DOWN, DEPRESSED OR HOPELESS: SEVERAL DAYS
6. FEELING BAD ABOUT YOURSELF - OR THAT YOU ARE A FAILURE OR HAVE LET YOURSELF OR YOUR FAMILY DOWN: SEVERAL DAYS
3. TROUBLE FALLING OR STAYING ASLEEP: MORE THAN HALF THE DAYS
8. MOVING OR SPEAKING SO SLOWLY THAT OTHER PEOPLE COULD HAVE NOTICED. OR THE OPPOSITE, BEING SO FIGETY OR RESTLESS THAT YOU HAVE BEEN MOVING AROUND A LOT MORE THAN USUAL: NOT AT ALL
4. FEELING TIRED OR HAVING LITTLE ENERGY: NEARLY EVERY DAY
5. POOR APPETITE OR OVEREATING: SEVERAL DAYS
SUM OF ALL RESPONSES TO PHQ QUESTIONS 1-9: 10
6. FEELING BAD ABOUT YOURSELF - OR THAT YOU ARE A FAILURE OR HAVE LET YOURSELF OR YOUR FAMILY DOWN: SEVERAL DAYS
SUM OF ALL RESPONSES TO PHQ QUESTIONS 1-9: 10
3. TROUBLE FALLING OR STAYING ASLEEP: MORE THAN HALF THE DAYS
1. LITTLE INTEREST OR PLEASURE IN DOING THINGS: SEVERAL DAYS
10. IF YOU CHECKED OFF ANY PROBLEMS, HOW DIFFICULT HAVE THESE PROBLEMS MADE IT FOR YOU TO DO YOUR WORK, TAKE CARE OF THINGS AT HOME, OR GET ALONG WITH OTHER PEOPLE: SOMEWHAT DIFFICULT
7. TROUBLE CONCENTRATING ON THINGS, SUCH AS READING THE NEWSPAPER OR WATCHING TELEVISION: SEVERAL DAYS
10. IF YOU CHECKED OFF ANY PROBLEMS, HOW DIFFICULT HAVE THESE PROBLEMS MADE IT FOR YOU TO DO YOUR WORK, TAKE CARE OF THINGS AT HOME, OR GET ALONG WITH OTHER PEOPLE: SOMEWHAT DIFFICULT

## 2025-06-17 ENCOUNTER — TELEMEDICINE (OUTPATIENT)
Age: 35
End: 2025-06-17
Payer: COMMERCIAL

## 2025-06-17 DIAGNOSIS — F32.A ANXIETY AND DEPRESSION: Primary | ICD-10-CM

## 2025-06-17 DIAGNOSIS — F41.9 ANXIETY AND DEPRESSION: Primary | ICD-10-CM

## 2025-06-17 DIAGNOSIS — M79.7 FIBROMYALGIA: ICD-10-CM

## 2025-06-17 PROCEDURE — 99213 OFFICE O/P EST LOW 20 MIN: CPT | Performed by: FAMILY MEDICINE

## 2025-06-17 RX ORDER — HYDROXYZINE HYDROCHLORIDE 50 MG/1
50 TABLET, FILM COATED ORAL 4 TIMES DAILY PRN
Qty: 90 TABLET | Refills: 1 | Status: SHIPPED | OUTPATIENT
Start: 2025-06-17

## 2025-06-17 RX ORDER — TIRZEPATIDE 2.5 MG/.5ML
2.5 INJECTION, SOLUTION SUBCUTANEOUS WEEKLY
COMMUNITY

## 2025-06-17 SDOH — ECONOMIC STABILITY: FOOD INSECURITY: WITHIN THE PAST 12 MONTHS, THE FOOD YOU BOUGHT JUST DIDN'T LAST AND YOU DIDN'T HAVE MONEY TO GET MORE.: NEVER TRUE

## 2025-06-17 SDOH — ECONOMIC STABILITY: FOOD INSECURITY: WITHIN THE PAST 12 MONTHS, YOU WORRIED THAT YOUR FOOD WOULD RUN OUT BEFORE YOU GOT MONEY TO BUY MORE.: NEVER TRUE

## 2025-06-17 NOTE — PROGRESS NOTES
Identified pt with two pt identifiers(name and )    Chief Complaint   Patient presents with    Follow-up    Anxiety        Health Maintenance Due   Topic    Varicella vaccine (1 of  - 13+ 2-dose series)    Hepatitis B vaccine (1 of 3 - + 3-dose series)    COVID-19 Vaccine (3 - 2024-25 season)       Wt Readings from Last 3 Encounters:   25 55.8 kg (123 lb)   25 57.2 kg (126 lb)   24 63 kg (139 lb)     Temp Readings from Last 3 Encounters:   25 97.8 °F (36.6 °C) (Temporal)   25 98.6 °F (37 °C) (Oral)     BP Readings from Last 3 Encounters:   25 108/74   25 129/79   24 113/77     Pulse Readings from Last 3 Encounters:   25 75   25 64   24 100           Depression Screening:  :         2025     2:44 PM 2025     9:09 PM 2022    11:18 AM   PHQ-9 Questionaire   Little interest or pleasure in doing things 1 1 0   Feeling down, depressed, or hopeless 1 1 0   Trouble falling or staying asleep, or sleeping too much 2 2 0   Feeling tired or having little energy 3 3 0   Poor appetite or overeating 1 1 0   Feeling bad about yourself - or that you are a failure or have let yourself or your family down 1 1 0   Trouble concentrating on things, such as reading the newspaper or watching television 1 1 0   Moving or speaking so slowly that other people could have noticed. Or the opposite - being so fidgety or restless that you have been moving around a lot more than usual 0 0 0   Thoughts that you would be better off dead, or of hurting yourself in some way 0 0    PHQ-9 Total Score 10  10  0   If you checked off any problems, how difficult have these problems made it for you to do your work, take care of things at home, or get along with other people? 1 1        Patient-reported        Fall Risk Assessment:  :          No data to display                 Abuse Screening:  :          No data to display                 Coordination of Care

## 2025-06-17 NOTE — PROGRESS NOTES
[address] [address]  Phone: [phone]  Fax: [phone]      Sapna Cordero, was evaluated through a synchronous (real-time) audio-video encounter. The patient (or guardian if applicable) is aware that this is a billable service, which includes applicable co-pays. This Virtual Visit was conducted with patient's (and/or legal guardian's) consent. Patient identification was verified, and a caregiver was present when appropriate.   The patient was located at Home: [address] [place] [id number]  Provider was located at Facility (Appt Dept): [facility] [place] [address]  Confirm you are appropriately licensed, registered, or certified to deliver care in the state where the patient is located as indicated above. If you are not or unsure, please re-schedule the visit: Yes, I confirm.       Chief Complaint   Patient presents with    Follow-up    Anxiety     She is a 35 y.o. female who presents for virtual follow up visit.  She established with me 6 weeks ago. We discussed increased depression and anxiety at that visit in addition to fibromyalgia. We made the modification for her to take her amitriptyline daily (was taking prn).    Thinks that this may be helping.  Has had several stressful situations.  Says that the hydroxyzine has helped during those situations.  Had some mild side effects (abdominal pain) that she attributes to the medication.  These have subsided.  She would like to continue taking this daily.    Since appt with me she has established with a commercial provider who is managing GLP-1 and hormone replacement (progesterone).  She is receiving compounded versions of these medications.  Aware that they are not FDA approved.        6/16/2025     2:44 PM   PHQ-9    Little interest or pleasure in doing things 1   Feeling down, depressed, or hopeless 1   Trouble falling or staying asleep, or sleeping too much 2   Feeling

## 2025-07-30 ENCOUNTER — TELEPHONE (OUTPATIENT)
Age: 35
End: 2025-07-30

## 2025-07-30 RX ORDER — NORETHINDRONE ACETATE AND ETHINYL ESTRADIOL, ETHINYL ESTRADIOL AND FERROUS FUMARATE 1MG-10(24)
1 KIT ORAL DAILY
Qty: 3 PACKET | Refills: 0 | Status: SHIPPED | OUTPATIENT
Start: 2025-07-30

## 2025-07-30 NOTE — TELEPHONE ENCOUNTER
Patient was called back and informed of work in [place] . Dr Ferraro, reviewed sent prescription for her birth control to get patient to her upcoming annual exam    Patient verbalized understanding.

## 2025-07-30 NOTE — TELEPHONE ENCOUNTER
Two patient identifiers used    35 year old patient , previously saw DM on 7/16/2024 and now has appointment on 8/21/2025 for ae with Dr Choi.      Patient is calling to ask for refill of her prescription for birth control   to get her to her scheduled appointment.      Rx pended for MD review and sign    Thank you

## 2025-08-07 RX ORDER — NORETHINDRONE ACETATE AND ETHINYL ESTRADIOL, AND FERROUS FUMARATE 1MG-20(24)
1 KIT ORAL DAILY
Qty: 84 TABLET | Refills: 4 | OUTPATIENT
Start: 2025-08-07

## 2025-08-11 RX ORDER — NORETHINDRONE ACETATE AND ETHINYL ESTRADIOL, AND FERROUS FUMARATE 1MG-20(24)
1 KIT ORAL DAILY
COMMUNITY
Start: 2025-05-16 | End: 2025-08-12 | Stop reason: SDUPTHER

## 2025-08-12 RX ORDER — NORETHINDRONE ACETATE AND ETHINYL ESTRADIOL, AND FERROUS FUMARATE 1MG-20(24)
1 KIT ORAL DAILY
Qty: 1 PACKET | Refills: 0 | Status: SHIPPED | OUTPATIENT
Start: 2025-08-12 | End: 2025-08-21 | Stop reason: SDUPTHER

## 2025-08-21 ENCOUNTER — OFFICE VISIT (OUTPATIENT)
Age: 35
End: 2025-08-21
Payer: COMMERCIAL

## 2025-08-21 VITALS
BODY MASS INDEX: 23.6 KG/M2 | SYSTOLIC BLOOD PRESSURE: 117 MMHG | HEIGHT: 60 IN | WEIGHT: 120.2 LBS | DIASTOLIC BLOOD PRESSURE: 75 MMHG

## 2025-08-21 DIAGNOSIS — Z01.419 WELL WOMAN EXAM WITH ROUTINE GYNECOLOGICAL EXAM: Primary | ICD-10-CM

## 2025-08-21 PROCEDURE — 99395 PREV VISIT EST AGE 18-39: CPT | Performed by: OBSTETRICS & GYNECOLOGY

## 2025-08-21 PROCEDURE — 99459 PELVIC EXAMINATION: CPT | Performed by: OBSTETRICS & GYNECOLOGY

## 2025-08-21 RX ORDER — NORETHINDRONE ACETATE AND ETHINYL ESTRADIOL, AND FERROUS FUMARATE 1MG-20(24)
1 KIT ORAL DAILY
Qty: 3 PACKET | Refills: 4 | Status: SHIPPED | OUTPATIENT
Start: 2025-08-21